# Patient Record
Sex: MALE | Race: BLACK OR AFRICAN AMERICAN | NOT HISPANIC OR LATINO | URBAN - METROPOLITAN AREA
[De-identification: names, ages, dates, MRNs, and addresses within clinical notes are randomized per-mention and may not be internally consistent; named-entity substitution may affect disease eponyms.]

---

## 2023-02-24 LAB
MAGNESIUM (MG/DL) IN SER/PLAS: 2.1 MG/DL (ref 1.6–2.4)
NATRIURETIC PEPTIDE B (PG/ML) IN SER/PLAS: 102 PG/ML (ref 0–99)
TROPONIN I, HIGH SENSITIVITY: 17 NG/L (ref 0–53)

## 2023-09-27 PROBLEM — Z79.01 CHRONIC ANTICOAGULATION: Status: ACTIVE | Noted: 2023-09-27

## 2023-09-27 PROBLEM — I26.99 PULMONARY EMBOLISM (MULTI): Status: ACTIVE | Noted: 2023-09-27

## 2023-09-27 RX ORDER — AMLODIPINE BESYLATE 10 MG/1
5 TABLET ORAL DAILY
COMMUNITY
Start: 2013-10-08 | End: 2024-06-04 | Stop reason: ALTCHOICE

## 2023-09-27 RX ORDER — CALCIUM CARB/VITAMIN D3/VIT K1 500-500-40
1 TABLET,CHEWABLE ORAL DAILY
COMMUNITY

## 2023-09-27 RX ORDER — CALCIUM CARBONATE 500(1250)
1 TABLET ORAL DAILY
COMMUNITY

## 2023-09-27 RX ORDER — MULTIVITAMIN
1 TABLET ORAL DAILY
COMMUNITY

## 2023-09-29 DIAGNOSIS — I26.99 PULMONARY EMBOLISM, UNSPECIFIED CHRONICITY, UNSPECIFIED PULMONARY EMBOLISM TYPE, UNSPECIFIED WHETHER ACUTE COR PULMONALE PRESENT (MULTI): Primary | ICD-10-CM

## 2023-09-29 LAB
INR IN PPP BY COAGULATION ASSAY EXTERNAL: 2.3
PROTHROMBIN TIME (PT) IN PPP BY COAGULATION ASSAY EXTERNAL: NORMAL SECONDS

## 2023-10-13 ENCOUNTER — ANTICOAGULATION - WARFARIN VISIT (OUTPATIENT)
Dept: CARDIOLOGY | Facility: CLINIC | Age: 73
End: 2023-10-13
Payer: MEDICARE

## 2023-10-13 DIAGNOSIS — I26.99 PULMONARY EMBOLISM, UNSPECIFIED CHRONICITY, UNSPECIFIED PULMONARY EMBOLISM TYPE, UNSPECIFIED WHETHER ACUTE COR PULMONALE PRESENT (MULTI): ICD-10-CM

## 2023-10-16 ENCOUNTER — ANTICOAGULATION - WARFARIN VISIT (OUTPATIENT)
Dept: CARDIOLOGY | Facility: CLINIC | Age: 73
End: 2023-10-16
Payer: MEDICARE

## 2023-10-16 ENCOUNTER — APPOINTMENT (OUTPATIENT)
Dept: CARDIOLOGY | Facility: CLINIC | Age: 73
End: 2023-10-16
Payer: MEDICARE

## 2023-10-16 ENCOUNTER — APPOINTMENT (OUTPATIENT)
Dept: CARDIOLOGY | Facility: CLINIC | Age: 73
End: 2023-10-16

## 2023-10-16 DIAGNOSIS — I26.99 PULMONARY EMBOLISM, UNSPECIFIED CHRONICITY, UNSPECIFIED PULMONARY EMBOLISM TYPE, UNSPECIFIED WHETHER ACUTE COR PULMONALE PRESENT (MULTI): Primary | ICD-10-CM

## 2023-10-16 LAB
POC INR: 3.2
POC PROTHROMBIN TIME: NORMAL

## 2023-10-16 PROCEDURE — 99211 OFF/OP EST MAY X REQ PHY/QHP: CPT | Mod: PO,27 | Performed by: INTERNAL MEDICINE

## 2023-10-16 PROCEDURE — 85610 PROTHROMBIN TIME: CPT | Mod: QW

## 2023-10-16 NOTE — PROGRESS NOTES
Patient identification verified with 2 identifiers.    Location: [facility] at [facility] - [address] [address] [phone] option #1     Referring Physician: ANKIT DAVID  Enrollment/ Re-enrollment date: 3/2/24   INR Goal: 2.0-3.0  INR monitoring is per [facility] protocol.  Anticoagulation Medication: warfarin  Indication: PE    Subjective   Bleeding signs/symptoms: No    Bruising: No   Major bleeding event: No  Thrombosis signs/symptoms: No  Thromboembolic event: No  Missed doses: No  Extra doses: No  Medication changes: No  Dietary changes: Yes  REDUCED GREENS OVER LAST VISIT  Change in health: No  Change in activity: No  Alcohol: No  Other concerns: No    Upcoming Surgeries:  Does the Patient Have any upcoming surgeries that require interruption in anticoagulation therapy? no  Does the patient require bridging? no      Anticoagulation Summary  As of 10/16/2023      INR goal:  2.0-3.0   TTR:  48.2 % (1 wk)   INR used for dosing:  3.20 (10/16/2023)   Weekly warfarin total:  41.25 mg               Assessment/Plan   Supratherapeutic     1. New dose: no change    2. Next INR: 1 week      Education provided to patient during the visit:  Patient instructed to call in interim with questions, concerns and changes.   Patient educated on interactions between medications and warfarin.   Patient educated on dietary consistency in vitamin k consumption.   Patient educated on affects of alcohol consumption while taking warfarin.   Patient educated on signs of bleeding/clotting.

## 2023-10-27 ENCOUNTER — APPOINTMENT (OUTPATIENT)
Dept: CARDIOLOGY | Facility: CLINIC | Age: 73
End: 2023-10-27
Payer: MEDICARE

## 2023-10-27 ENCOUNTER — ANTICOAGULATION - WARFARIN VISIT (OUTPATIENT)
Dept: CARDIOLOGY | Facility: CLINIC | Age: 73
End: 2023-10-27
Payer: MEDICARE

## 2023-10-27 ENCOUNTER — CLINICAL SUPPORT (OUTPATIENT)
Dept: CARDIOLOGY | Facility: CLINIC | Age: 73
End: 2023-10-27
Payer: MEDICARE

## 2023-10-27 DIAGNOSIS — I26.99 PULMONARY EMBOLISM, UNSPECIFIED CHRONICITY, UNSPECIFIED PULMONARY EMBOLISM TYPE, UNSPECIFIED WHETHER ACUTE COR PULMONALE PRESENT (MULTI): ICD-10-CM

## 2023-10-27 DIAGNOSIS — I26.99 PULMONARY EMBOLISM, UNSPECIFIED CHRONICITY, UNSPECIFIED PULMONARY EMBOLISM TYPE, UNSPECIFIED WHETHER ACUTE COR PULMONALE PRESENT (MULTI): Primary | ICD-10-CM

## 2023-10-27 LAB
POC INR: 2.7
POC PROTHROMBIN TIME: NORMAL

## 2023-10-27 PROCEDURE — 85610 PROTHROMBIN TIME: CPT | Mod: QW

## 2023-10-27 PROCEDURE — 99211 OFF/OP EST MAY X REQ PHY/QHP: CPT | Mod: PO | Performed by: INTERNAL MEDICINE

## 2023-10-27 RX ORDER — WARFARIN 7.5 MG/1
TABLET ORAL
Qty: 90 TABLET | Refills: 2 | Status: SHIPPED | OUTPATIENT
Start: 2023-10-27 | End: 2024-10-26

## 2023-10-27 NOTE — PROGRESS NOTES
"Patient identification verified with 2 identifiers.    Location: [facility] at [facility] - [address] [address] [phone] option #1     Referring Physician: DR. LAURENCE PHAM   Enrollment/ Re-enrollment date: 2024   INR Goal: 2.0-3.0  INR monitoring is per [facility] protocol.  Anticoagulation Medication: warfarin  Indication: PE    Subjective   Bleeding signs/symptoms:    { ACOAG bleedin}  Bruising:     Major bleeding event:    Thrombosis signs/symptoms:    Thromboembolic event:    Missed doses:    Extra doses:    Medication changes:    Dietary changes:    Change in health:    Change in activity:    Alcohol:    Other concerns:      Upcoming Surgeries:  Does the Patient Have any upcoming surgeries that require interruption in anticoagulation therapy? {yes/no:59553}  Does the patient require bridging? {yes/no:07497}          Assessment/Plan   {therapeutic/subtherapeutic/supratherapeutic:06671}     1. New dose: {no change:59609}    2. Next INR: {time frame/weeks 1-4:63412}      Education provided to patient during the visit:  {Anticoag Pt Ed. documentation:48963::\"Patient instructed to call in interim with questions, concerns and changes. \"}       "

## 2023-10-27 NOTE — PROGRESS NOTES
Patient identification verified with 2 identifiers.    Location: [facility] at [facility] - [address] [address] [phone] option #1     Referring Physician: DR. LAURENCE PHAM  Enrollment/ Re-enrollment date: 2024   INR Goal: 2.0-3.0  INR monitoring is per [facility] protocol.  Anticoagulation Medication: warfarin  Indication: Pulmonary Embolism (PE)    Subjective   Bleeding signs/symptoms: No    Bruising: No   Major bleeding event: No  Thrombosis signs/symptoms: No  Thromboembolic event: No  Missed doses: No  Extra doses: No  Medication changes: No  Dietary changes: No  Change in health: No  Change in activity: No  Alcohol: No  Other concerns: No    Upcoming Surgeries:  Does the Patient Have any upcoming surgeries that require interruption in anticoagulation therapy? no  Does the patient require bridging? no      Anticoagulation Summary  As of 10/27/2023      INR goal:  2.0-3.0   TTR:  55.2 % (2.6 wk)   INR used for dosin.70 (10/27/2023)   Weekly warfarin total:  41.25 mg               Assessment/Plan   Therapeutic     1. New dose: no change    2. Next INR: 2 weeks      Education provided to patient during the visit:  Patient instructed to call in interim with questions, concerns and changes.   Patient educated on compliance with dosing, follow up appointments, and prescribed plan of care.

## 2023-11-10 ENCOUNTER — TELEPHONE (OUTPATIENT)
Dept: RADIATION ONCOLOGY | Facility: HOSPITAL | Age: 73
End: 2023-11-10
Payer: MEDICARE

## 2023-11-10 ENCOUNTER — ANTICOAGULATION - WARFARIN VISIT (OUTPATIENT)
Dept: CARDIOLOGY | Facility: CLINIC | Age: 73
End: 2023-11-10
Payer: MEDICARE

## 2023-11-10 DIAGNOSIS — I26.99 PULMONARY EMBOLISM, UNSPECIFIED CHRONICITY, UNSPECIFIED PULMONARY EMBOLISM TYPE, UNSPECIFIED WHETHER ACUTE COR PULMONALE PRESENT (MULTI): Primary | ICD-10-CM

## 2023-11-10 LAB
POC INR: 2.7
POC PROTHROMBIN TIME: NORMAL

## 2023-11-10 PROCEDURE — 99211 OFF/OP EST MAY X REQ PHY/QHP: CPT | Mod: PO,27 | Performed by: INTERNAL MEDICINE

## 2023-11-10 PROCEDURE — 85610 PROTHROMBIN TIME: CPT | Mod: QW

## 2023-11-10 NOTE — PROGRESS NOTES
Patient identification verified with 2 identifiers.    Location: [facility] at [facility] - [address] [address] [phone] option #1     Referring Physician: DR. LAURENCE PHAM  Enrollment/ Re-enrollment date: 2024   INR Goal: 2.0-3.0  INR monitoring is per [facility] protocol.  Anticoagulation Medication: warfarin  Indication: Pulmonary Embolism (PE)    Subjective   Bleeding signs/symptoms: No    Bruising: No   Major bleeding event: No  Thrombosis signs/symptoms: No  Thromboembolic event: No  Missed doses: No  Extra doses: No  Medication changes: No  Dietary changes: No  Change in health: No  Change in activity: No  Alcohol: No  Other concerns: No    Upcoming Surgeries:  Does the Patient Have any upcoming surgeries that require interruption in anticoagulation therapy? no  Does the patient require bridging? no      Anticoagulation Summary  As of 11/10/2023      INR goal:  2.0-3.0   TTR:  74.4 % (1.1 mo)   INR used for dosin.70 (11/10/2023)   Weekly warfarin total:  41.25 mg               Assessment/Plan   Therapeutic     1. New dose: no change    2. Next INR: 4 weeks      Education provided to patient during the visit:  Patient instructed to call in interim with questions, concerns and changes.   Patient educated on compliance with dosing, follow up appointments, and prescribed plan of care.

## 2023-11-10 NOTE — TELEPHONE ENCOUNTER
Called pt. to remind of appointment on 11/13/2023 at 2:00 pm with ADAM Navarrete.  Pt answered and confirmed appointment.

## 2023-11-13 ENCOUNTER — HOSPITAL ENCOUNTER (OUTPATIENT)
Dept: RADIATION ONCOLOGY | Facility: HOSPITAL | Age: 73
Setting detail: RADIATION/ONCOLOGY SERIES
Discharge: HOME | End: 2023-11-13
Payer: MEDICARE

## 2023-11-13 ENCOUNTER — LAB (OUTPATIENT)
Dept: LAB | Facility: HOSPITAL | Age: 73
End: 2023-11-13
Payer: MEDICARE

## 2023-11-13 VITALS
DIASTOLIC BLOOD PRESSURE: 85 MMHG | BODY MASS INDEX: 29.83 KG/M2 | RESPIRATION RATE: 18 BRPM | TEMPERATURE: 96.6 F | SYSTOLIC BLOOD PRESSURE: 129 MMHG | HEIGHT: 73 IN | WEIGHT: 225.09 LBS | HEART RATE: 85 BPM | OXYGEN SATURATION: 95 %

## 2023-11-13 DIAGNOSIS — C61 MALIGNANT NEOPLASM OF PROSTATE (MULTI): Primary | ICD-10-CM

## 2023-11-13 DIAGNOSIS — C61 MALIGNANT NEOPLASM OF PROSTATE (MULTI): ICD-10-CM

## 2023-11-13 DIAGNOSIS — Z15.89 JAK2 V617F MUTATION: ICD-10-CM

## 2023-11-13 LAB
ALBUMIN SERPL BCP-MCNC: 3.9 G/DL (ref 3.4–5)
ALP SERPL-CCNC: 51 U/L (ref 33–136)
ALT SERPL W P-5'-P-CCNC: 18 U/L (ref 10–52)
ANION GAP SERPL CALC-SCNC: 10 MMOL/L (ref 10–20)
AST SERPL W P-5'-P-CCNC: 31 U/L (ref 9–39)
BASOPHILS # BLD AUTO: 0.02 X10*3/UL (ref 0–0.1)
BASOPHILS NFR BLD AUTO: 0.5 %
BILIRUB SERPL-MCNC: 0.4 MG/DL (ref 0–1.2)
BUN SERPL-MCNC: 19 MG/DL (ref 6–23)
CALCIUM SERPL-MCNC: 9.3 MG/DL (ref 8.6–10.3)
CHLORIDE SERPL-SCNC: 106 MMOL/L (ref 98–107)
CO2 SERPL-SCNC: 27 MMOL/L (ref 21–32)
CREAT SERPL-MCNC: 1.5 MG/DL (ref 0.5–1.3)
EOSINOPHIL # BLD AUTO: 0.14 X10*3/UL (ref 0–0.4)
EOSINOPHIL NFR BLD AUTO: 3.4 %
ERYTHROCYTE [DISTWIDTH] IN BLOOD BY AUTOMATED COUNT: 14.2 % (ref 11.5–14.5)
GFR SERPL CREATININE-BSD FRML MDRD: 48 ML/MIN/1.73M*2
GLUCOSE SERPL-MCNC: 83 MG/DL (ref 74–99)
HCT VFR BLD AUTO: 35.7 % (ref 41–52)
HGB BLD-MCNC: 11.9 G/DL (ref 13.5–17.5)
IMM GRANULOCYTES # BLD AUTO: 0.01 X10*3/UL (ref 0–0.5)
IMM GRANULOCYTES NFR BLD AUTO: 0.2 % (ref 0–0.9)
LYMPHOCYTES # BLD AUTO: 1.11 X10*3/UL (ref 0.8–3)
LYMPHOCYTES NFR BLD AUTO: 27.1 %
MCH RBC QN AUTO: 31.6 PG (ref 26–34)
MCHC RBC AUTO-ENTMCNC: 33.3 G/DL (ref 32–36)
MCV RBC AUTO: 95 FL (ref 80–100)
MONOCYTES # BLD AUTO: 0.47 X10*3/UL (ref 0.05–0.8)
MONOCYTES NFR BLD AUTO: 11.5 %
NEUTROPHILS # BLD AUTO: 2.34 X10*3/UL (ref 1.6–5.5)
NEUTROPHILS NFR BLD AUTO: 57.3 %
NRBC BLD-RTO: 0 /100 WBCS (ref 0–0)
PLATELET # BLD AUTO: 162 X10*3/UL (ref 150–450)
POTASSIUM SERPL-SCNC: 4.2 MMOL/L (ref 3.5–5.3)
PROT SERPL-MCNC: 6.8 G/DL (ref 6.4–8.2)
RBC # BLD AUTO: 3.76 X10*6/UL (ref 4.5–5.9)
SODIUM SERPL-SCNC: 139 MMOL/L (ref 136–145)
TESTOST SERPL-MCNC: 296 NG/DL (ref 240–1000)
WBC # BLD AUTO: 4.1 X10*3/UL (ref 4.4–11.3)

## 2023-11-13 PROCEDURE — 99214 OFFICE O/P EST MOD 30 MIN: CPT | Mod: PO

## 2023-11-13 PROCEDURE — 84153 ASSAY OF PSA TOTAL: CPT

## 2023-11-13 PROCEDURE — 36415 COLL VENOUS BLD VENIPUNCTURE: CPT

## 2023-11-13 PROCEDURE — 80053 COMPREHEN METABOLIC PANEL: CPT

## 2023-11-13 PROCEDURE — 84403 ASSAY OF TOTAL TESTOSTERONE: CPT

## 2023-11-13 PROCEDURE — 99214 OFFICE O/P EST MOD 30 MIN: CPT

## 2023-11-13 PROCEDURE — 85025 COMPLETE CBC W/AUTO DIFF WBC: CPT

## 2023-11-13 PROCEDURE — 84154 ASSAY OF PSA FREE: CPT | Mod: 59

## 2023-11-13 ASSESSMENT — PATIENT HEALTH QUESTIONNAIRE - PHQ9
2. FEELING DOWN, DEPRESSED OR HOPELESS: NOT AT ALL
1. LITTLE INTEREST OR PLEASURE IN DOING THINGS: NOT AT ALL
SUM OF ALL RESPONSES TO PHQ9 QUESTIONS 1 AND 2: 0

## 2023-11-13 ASSESSMENT — ENCOUNTER SYMPTOMS
BLOOD IN STOOL: 0
DEPRESSION: 0
ANAL BLEEDING: 0
COUGH: 0
WEAKNESS: 0
ABDOMINAL PAIN: 0
DYSURIA: 0
DIARRHEA: 0
FREQUENCY: 0
JOINT SWELLING: 0
OCCASIONAL FEELINGS OF UNSTEADINESS: 0
UNEXPECTED WEIGHT CHANGE: 0
RECTAL PAIN: 0
ALLERGIC/IMMUNOLOGIC NEGATIVE: 1
LOSS OF SENSATION IN FEET: 0
HEMATURIA: 0
CONSTIPATION: 0
PSYCHIATRIC NEGATIVE: 1
BACK PAIN: 0
ARTHRALGIAS: 0
PALPITATIONS: 0
DIFFICULTY URINATING: 0
DIZZINESS: 0
FEVER: 0
FATIGUE: 0
CHEST TIGHTNESS: 0
SHORTNESS OF BREATH: 0

## 2023-11-13 ASSESSMENT — COLUMBIA-SUICIDE SEVERITY RATING SCALE - C-SSRS
2. HAVE YOU ACTUALLY HAD ANY THOUGHTS OF KILLING YOURSELF?: NO
1. IN THE PAST MONTH, HAVE YOU WISHED YOU WERE DEAD OR WISHED YOU COULD GO TO SLEEP AND NOT WAKE UP?: NO
6. HAVE YOU EVER DONE ANYTHING, STARTED TO DO ANYTHING, OR PREPARED TO DO ANYTHING TO END YOUR LIFE?: NO

## 2023-11-13 ASSESSMENT — PAIN SCALES - GENERAL: PAINLEVEL: 0-NO PAIN

## 2023-11-14 ENCOUNTER — APPOINTMENT (OUTPATIENT)
Dept: RADIATION ONCOLOGY | Facility: HOSPITAL | Age: 73
End: 2023-11-14
Payer: MEDICARE

## 2023-11-15 LAB
PSA FREE MFR SERPL: NORMAL %
PSA FREE SERPL-MCNC: <0.1 NG/ML
PSA SERPL IA-MCNC: <0.1 NG/ML (ref 0–4)

## 2023-11-16 ENCOUNTER — TELEPHONE (OUTPATIENT)
Dept: RADIATION ONCOLOGY | Facility: HOSPITAL | Age: 73
End: 2023-11-16
Payer: MEDICARE

## 2023-11-16 NOTE — TELEPHONE ENCOUNTER
Reviewed with patient PSA of <0.10 consistent with prior. Also reviewed CBC improving as well as CMP. Poor renal function consistent with prior levels. Will continue to follow up PSA in 3m per [id number].

## 2023-11-23 ENCOUNTER — HOSPITAL ENCOUNTER (EMERGENCY)
Facility: HOSPITAL | Age: 73
Discharge: HOME | End: 2023-11-23
Attending: EMERGENCY MEDICINE
Payer: MEDICARE

## 2023-11-23 ENCOUNTER — APPOINTMENT (OUTPATIENT)
Dept: RADIOLOGY | Facility: HOSPITAL | Age: 73
End: 2023-11-23
Payer: MEDICARE

## 2023-11-23 VITALS
TEMPERATURE: 98.2 F | DIASTOLIC BLOOD PRESSURE: 89 MMHG | OXYGEN SATURATION: 95 % | RESPIRATION RATE: 18 BRPM | SYSTOLIC BLOOD PRESSURE: 126 MMHG | HEART RATE: 79 BPM

## 2023-11-23 DIAGNOSIS — M54.42 ACUTE BILATERAL LOW BACK PAIN WITH BILATERAL SCIATICA: Primary | ICD-10-CM

## 2023-11-23 DIAGNOSIS — S39.012A LUMBAR STRAIN, INITIAL ENCOUNTER: ICD-10-CM

## 2023-11-23 DIAGNOSIS — M54.41 ACUTE BILATERAL LOW BACK PAIN WITH BILATERAL SCIATICA: Primary | ICD-10-CM

## 2023-11-23 LAB
ALBUMIN SERPL BCP-MCNC: 3.6 G/DL (ref 3.4–5)
ALP SERPL-CCNC: 54 U/L (ref 33–136)
ALT SERPL W P-5'-P-CCNC: 17 U/L (ref 10–52)
ANION GAP SERPL CALC-SCNC: 10 MMOL/L (ref 10–20)
AST SERPL W P-5'-P-CCNC: 26 U/L (ref 9–39)
BASOPHILS # BLD AUTO: 0.02 X10*3/UL (ref 0–0.1)
BASOPHILS NFR BLD AUTO: 0.5 %
BILIRUB DIRECT SERPL-MCNC: 0.1 MG/DL (ref 0–0.3)
BILIRUB SERPL-MCNC: 0.5 MG/DL (ref 0–1.2)
BUN SERPL-MCNC: 17 MG/DL (ref 6–23)
CALCIUM SERPL-MCNC: 8.3 MG/DL (ref 8.6–10.3)
CHLORIDE SERPL-SCNC: 107 MMOL/L (ref 98–107)
CO2 SERPL-SCNC: 24 MMOL/L (ref 21–32)
CREAT SERPL-MCNC: 1.65 MG/DL (ref 0.5–1.3)
EOSINOPHIL # BLD AUTO: 0.15 X10*3/UL (ref 0–0.4)
EOSINOPHIL NFR BLD AUTO: 3.6 %
ERYTHROCYTE [DISTWIDTH] IN BLOOD BY AUTOMATED COUNT: 14.1 % (ref 11.5–14.5)
GFR SERPL CREATININE-BSD FRML MDRD: 43 ML/MIN/1.73M*2
GLUCOSE SERPL-MCNC: 102 MG/DL (ref 74–99)
HCT VFR BLD AUTO: 34.6 % (ref 41–52)
HGB BLD-MCNC: 11.3 G/DL (ref 13.5–17.5)
IMM GRANULOCYTES # BLD AUTO: 0.01 X10*3/UL (ref 0–0.5)
IMM GRANULOCYTES NFR BLD AUTO: 0.2 % (ref 0–0.9)
INR PPP: 3.5 (ref 0.9–1.1)
LYMPHOCYTES # BLD AUTO: 1.06 X10*3/UL (ref 0.8–3)
LYMPHOCYTES NFR BLD AUTO: 25.7 %
MCH RBC QN AUTO: 31 PG (ref 26–34)
MCHC RBC AUTO-ENTMCNC: 32.7 G/DL (ref 32–36)
MCV RBC AUTO: 95 FL (ref 80–100)
MONOCYTES # BLD AUTO: 0.39 X10*3/UL (ref 0.05–0.8)
MONOCYTES NFR BLD AUTO: 9.4 %
NEUTROPHILS # BLD AUTO: 2.5 X10*3/UL (ref 1.6–5.5)
NEUTROPHILS NFR BLD AUTO: 60.6 %
NRBC BLD-RTO: 0 /100 WBCS (ref 0–0)
PLATELET # BLD AUTO: 185 X10*3/UL (ref 150–450)
POTASSIUM SERPL-SCNC: 3.8 MMOL/L (ref 3.5–5.3)
PROT SERPL-MCNC: 6.2 G/DL (ref 6.4–8.2)
PROTHROMBIN TIME: 39.5 SECONDS (ref 9.8–12.8)
RBC # BLD AUTO: 3.64 X10*6/UL (ref 4.5–5.9)
SODIUM SERPL-SCNC: 137 MMOL/L (ref 136–145)
WBC # BLD AUTO: 4.1 X10*3/UL (ref 4.4–11.3)

## 2023-11-23 PROCEDURE — 99284 EMERGENCY DEPT VISIT MOD MDM: CPT | Mod: 25

## 2023-11-23 PROCEDURE — 36415 COLL VENOUS BLD VENIPUNCTURE: CPT | Performed by: EMERGENCY MEDICINE

## 2023-11-23 PROCEDURE — 80048 BASIC METABOLIC PNL TOTAL CA: CPT | Performed by: EMERGENCY MEDICINE

## 2023-11-23 PROCEDURE — 96374 THER/PROPH/DIAG INJ IV PUSH: CPT

## 2023-11-23 PROCEDURE — 2500000004 HC RX 250 GENERAL PHARMACY W/ HCPCS (ALT 636 FOR OP/ED): Performed by: EMERGENCY MEDICINE

## 2023-11-23 PROCEDURE — 99285 EMERGENCY DEPT VISIT HI MDM: CPT | Mod: 25 | Performed by: EMERGENCY MEDICINE

## 2023-11-23 PROCEDURE — 84075 ASSAY ALKALINE PHOSPHATASE: CPT | Performed by: EMERGENCY MEDICINE

## 2023-11-23 PROCEDURE — 85025 COMPLETE CBC W/AUTO DIFF WBC: CPT | Performed by: EMERGENCY MEDICINE

## 2023-11-23 PROCEDURE — 72131 CT LUMBAR SPINE W/O DYE: CPT

## 2023-11-23 PROCEDURE — 85610 PROTHROMBIN TIME: CPT | Performed by: EMERGENCY MEDICINE

## 2023-11-23 PROCEDURE — 96375 TX/PRO/DX INJ NEW DRUG ADDON: CPT

## 2023-11-23 PROCEDURE — 72131 CT LUMBAR SPINE W/O DYE: CPT | Mod: FOREIGN READ | Performed by: RADIOLOGY

## 2023-11-23 RX ORDER — ORPHENADRINE CITRATE 100 MG/1
100 TABLET, EXTENDED RELEASE ORAL 2 TIMES DAILY PRN
Qty: 20 TABLET | Refills: 0 | Status: SHIPPED | OUTPATIENT
Start: 2023-11-23 | End: 2023-12-03

## 2023-11-23 RX ORDER — MORPHINE SULFATE 4 MG/ML
4 INJECTION, SOLUTION INTRAMUSCULAR; INTRAVENOUS ONCE
Status: COMPLETED | OUTPATIENT
Start: 2023-11-23 | End: 2023-11-23

## 2023-11-23 RX ORDER — ORPHENADRINE CITRATE 30 MG/ML
60 INJECTION INTRAMUSCULAR; INTRAVENOUS ONCE
Status: COMPLETED | OUTPATIENT
Start: 2023-11-23 | End: 2023-11-23

## 2023-11-23 RX ORDER — ONDANSETRON HYDROCHLORIDE 2 MG/ML
4 INJECTION, SOLUTION INTRAVENOUS ONCE
Status: COMPLETED | OUTPATIENT
Start: 2023-11-23 | End: 2023-11-23

## 2023-11-23 RX ORDER — ACETAMINOPHEN 325 MG/1
975 TABLET ORAL ONCE
Status: COMPLETED | OUTPATIENT
Start: 2023-11-23 | End: 2023-11-23

## 2023-11-23 RX ADMIN — HYDROMORPHONE HYDROCHLORIDE 0.5 MG: 1 INJECTION, SOLUTION INTRAMUSCULAR; INTRAVENOUS; SUBCUTANEOUS at 08:30

## 2023-11-23 RX ADMIN — ORPHENADRINE CITRATE 60 MG: 60 INJECTION INTRAMUSCULAR; INTRAVENOUS at 08:29

## 2023-11-23 RX ADMIN — MORPHINE SULFATE 4 MG: 4 INJECTION, SOLUTION INTRAMUSCULAR; INTRAVENOUS at 06:57

## 2023-11-23 RX ADMIN — ONDANSETRON 4 MG: 2 INJECTION INTRAMUSCULAR; INTRAVENOUS at 06:57

## 2023-11-23 RX ADMIN — ACETAMINOPHEN 975 MG: 325 TABLET ORAL at 08:30

## 2023-11-23 ASSESSMENT — PAIN - FUNCTIONAL ASSESSMENT: PAIN_FUNCTIONAL_ASSESSMENT: 0-10

## 2023-11-23 ASSESSMENT — PAIN SCALES - GENERAL: PAINLEVEL_OUTOF10: 3

## 2023-11-23 ASSESSMENT — COLUMBIA-SUICIDE SEVERITY RATING SCALE - C-SSRS
2. HAVE YOU ACTUALLY HAD ANY THOUGHTS OF KILLING YOURSELF?: NO
6. HAVE YOU EVER DONE ANYTHING, STARTED TO DO ANYTHING, OR PREPARED TO DO ANYTHING TO END YOUR LIFE?: NO
1. IN THE PAST MONTH, HAVE YOU WISHED YOU WERE DEAD OR WISHED YOU COULD GO TO SLEEP AND NOT WAKE UP?: NO

## 2023-11-23 ASSESSMENT — PAIN DESCRIPTION - LOCATION: LOCATION: BACK

## 2023-11-23 NOTE — ED PROVIDER NOTES
HPI   Chief Complaint   Patient presents with    Back Pain     Low back after he felt a pop yesterday whilst working out       HPI: []  73-year-old  male with a history of hypertension, pulm embolism on Coumadin today comes in with atraumatic back pain.  He states that few days ago he did lifting heavy was working out and he injured his back.  No trauma no falls.  Since then he has severe low back pain.  Able to ambulate.  Pain nonradiating.  No numbness or weakness or extremities no seizures no incontinence.  No fever no chills.  No drug use.  No back surgeries in the past.  Patient denies any chest pain pressure heaviness fever chills nausea vomit diarrhea cough congestion incontinence seizures syncope or ischemia recent travel hospitalization or antibiotic use.  He states he has been worked up/being screened for prostate cancer.    Past history: Hypertension, pulm embolism, questionable prostate cancer  Social: Denies tobacco alcohol drug abuse.    REVIEW OF SYSTEMS:    GENERAL.: No weight loss, fatigue, anorexia, insomnia, fever.    EYES: No vision loss, double vision, drainage, eye pain.    ENT: No pharyngitis, dry mouth.    CARDIOPULMONARY: No chest pain, palpitations, syncope, near syncope. No shortness of breath, cough, hemoptysis.    GI: No abdominal pain, change in bowel habits, melena, hematemesis, hematochezia, nausea, vomiting, diarrhea.    : No discharge, dysuria, frequency, urgency, hematuria.    MS: No limb pain, joint pain, joint swelling.  Positive for back pain    SKIN: No rashes.    PSYCH: No depression, anxiety, suicidality, homicidality.    Review of systems is otherwise negative unless stated above or in history of present illness.  Social history, family history, allergies reviewed.  PHYSICAL EXAM:    GENERAL: Vitals noted, no distress. Alert and oriented  x 3. Non-toxic.  Appears uncomfortable    EENT: TMs clear. Posterior oropharynx unremarkable. No meningismus. No LAD.     NECK:  Supple. Nontender. No midline tenderness.     CARDIAC: Regular, rate, rhythm. No murmurs rubs or gallops. No JVD    PULMONARY: Lungs clear bilaterally with good aeration. No wheezes rales or rhonchi. No respiratory distress.     ABDOMEN: Soft, nonsurgical. Nontender. No peritoneal signs. Normoactive bowel sounds. No pulsatile masses.     EXTREMITIES: No peripheral edema. Negative Homans bilaterally, no cords.  2+ bounding pulses well-perfused  Musculoskeletal patient no midline C, T, L-spine tenderness.  He is tender palpation of bilateral SI joints.  Straight leg is positive bilaterally symmetrically at around 60 degrees.  SKIN: No rash. Intact.     NEURO: No focal neurologic deficits, NIH score of 0. Cranial nerves normal as tested from II through XII.  Patient has intact sensory exam intact motor exam motor strength 5 out of 5 lower extremities bilaterally DTRs are 2+ at the knees 1+ at the ankles.  No clonus.    MEDICAL DECISION MAKING:  CBC with differential chemistry LFTs are unremarkable 3.5 therapeutic CT L-spine shows advanced DJD no fractures and no evidence of epidural hematoma or any mass lesions.    Treatment in ED: IV established given IV morphine for pain control, oral Tylenol and Norflex    ED course: Repeat assessment feeling remarkably better able to ambulate remains neurologic intact most to go home.    Impression: #1 acute back pain, #2 acute lumbosacral radiculopathy    Plan set MDM: Elderly male today comes in with traumatic back pain appears mechanical musculoskeletal low suspicion for discitis osteomyelitis epidural hematoma spinal epidural abscess, patient neurologic intact no suspicion for spinal cord compromise or cord compression or cauda equina syndrome low suspicion for AAA, patient feels better wants to go home will be discharged home with the Tylenol and Norflex advised physical therapy outpatient [facility] with strict return precaution.                          [place] Coma Scale Score:  15                  Patient History   Past Medical History:   Diagnosis Date    CKD (chronic kidney disease)     HTN (hypertension)     Other pulmonary embolism without acute cor pulmonale (CMS/HCC) 06/03/2014    Pulmonary embolism    Personal history of other diseases of the musculoskeletal system and connective tissue 04/23/2019    History of arthritis     Past Surgical History:   Procedure Laterality Date    OTHER SURGICAL HISTORY  04/23/2019    Knee surgery    OTHER SURGICAL HISTORY  04/23/2019    Shoulder surgery    OTHER SURGICAL HISTORY  04/23/2019    Gallbladder surgery     No family history on file.  Social History     Tobacco Use    Smoking status: Former     Types: Cigarettes     Passive exposure: Past    Smokeless tobacco: Never   Substance Use Topics    Alcohol use: Not on file    Drug use: Not on file       Physical Exam   ED Triage Vitals   Temp Heart Rate Resp BP   11/23/23 0620 11/23/23 0645 11/23/23 0620 11/23/23 0620   36.8 °C (98.2 °F) 79 18 (!) 134/91      SpO2 Temp src Heart Rate Source Patient Position   11/23/23 0620 -- 11/23/23 0645 11/23/23 0645   96 %  Monitor Sitting      BP Location FiO2 (%)     11/23/23 0645 --     Right arm        Physical Exam    ED Course & MDM   ED Course as of 11/23/23 1334   Thu Nov 23, 2023   1255 Upon reassessment patient's pain is well-controlled.  He remains neurologically intact bounding pulses low suspicion for cord compromise or spinal cord compression or epidural hematoma discitis osteomyelitis or AAA, patient labs reviewed imaging reviewed with the patient he wants to go home will be discharged home with Tylenol and Norflex advised abortive care physical therapy outpatient follow with primary doctor and spine with strict return precaution.  Again please make an outpatient remains neurologically intact with no neurovascular compromise bounding pulses with low suspicion for cord compromise cord compression cauda equina syndrome AAA or any other catastrophic  pathology. [MT]      ED Course User Index  [MT] Katie Aguilera MD         Diagnoses as of 11/23/23 1334   Acute bilateral low back pain with bilateral sciatica   Lumbar strain, initial encounter       Medical Decision Making      Procedure  Procedures     Katie Aguilera MD  11/23/23 5607

## 2023-12-08 ENCOUNTER — ANTICOAGULATION - WARFARIN VISIT (OUTPATIENT)
Dept: CARDIOLOGY | Facility: CLINIC | Age: 73
End: 2023-12-08
Payer: MEDICARE

## 2023-12-08 DIAGNOSIS — I26.99 PULMONARY EMBOLISM, UNSPECIFIED CHRONICITY, UNSPECIFIED PULMONARY EMBOLISM TYPE, UNSPECIFIED WHETHER ACUTE COR PULMONALE PRESENT (MULTI): ICD-10-CM

## 2023-12-08 LAB
POC INR: 1.9
POC PROTHROMBIN TIME: NORMAL

## 2023-12-08 PROCEDURE — 99211 OFF/OP EST MAY X REQ PHY/QHP: CPT | Mod: PO,27 | Performed by: INTERNAL MEDICINE

## 2023-12-08 PROCEDURE — 85610 PROTHROMBIN TIME: CPT | Mod: QW

## 2023-12-08 NOTE — PROGRESS NOTES
Patient identification verified with 2 identifiers.    Location: [facility] at [facility] - [address] [address] [phone] option #1     Referring Physician: DR. LAURENCE PHAM  Enrollment/ Re-enrollment date: 2024   INR Goal: 2.0-3.0  INR monitoring is per [facility] protocol.  Anticoagulation Medication: warfarin  Indication: Pulmonary Embolism (PE)    Subjective   Bleeding signs/symptoms: No    Bruising: No   Major bleeding event: No  Thrombosis signs/symptoms: No  Thromboembolic event: No  Missed doses: No  Extra doses: No  Medication changes: No  Dietary changes: No  Change in health: No  Change in activity: No  Alcohol: No  Other concerns: No    Upcoming Surgeries:  Does the Patient Have any upcoming surgeries that require interruption in anticoagulation therapy? no  Does the patient require bridging? no      Anticoagulation Summary  As of 2023      INR goal:  2.0-3.0   TTR:  63.7 % (2 mo)   INR used for dosin.90 (2023)   Weekly warfarin total:  41.25 mg               Assessment/Plan   SUB THERAPEUTIC    1. New dose: no change  as pt was monthly on this dose  2. Next INR: 1 WEEK      Education provided to patient during the visit:  Patient instructed to call in interim with questions, concerns and changes.   Patient educated on compliance with dosing, follow up appointments, and prescribed plan of care.

## 2023-12-15 ENCOUNTER — ANTICOAGULATION - WARFARIN VISIT (OUTPATIENT)
Dept: CARDIOLOGY | Facility: CLINIC | Age: 73
End: 2023-12-15
Payer: MEDICARE

## 2023-12-15 DIAGNOSIS — I26.99 PULMONARY EMBOLISM, UNSPECIFIED CHRONICITY, UNSPECIFIED PULMONARY EMBOLISM TYPE, UNSPECIFIED WHETHER ACUTE COR PULMONALE PRESENT (MULTI): Primary | ICD-10-CM

## 2023-12-15 LAB
POC INR: 2.4
POC PROTHROMBIN TIME: NORMAL

## 2023-12-15 PROCEDURE — 99211 OFF/OP EST MAY X REQ PHY/QHP: CPT | Mod: PO,27 | Performed by: INTERNAL MEDICINE

## 2023-12-15 PROCEDURE — 85610 PROTHROMBIN TIME: CPT | Mod: QW

## 2023-12-15 NOTE — PROGRESS NOTES
Patient identification verified with 2 identifiers.    Location: [facility] at [facility] - [address] [address] [phone] option #1     Referring Physician: DR. LAURENCE PHAM  Enrollment/ Re-enrollment date: 2024   INR Goal: 2.0-3.0  INR monitoring is per [facility] protocol.  Anticoagulation Medication: warfarin  Indication: Pulmonary Embolism (PE)    Subjective   Bleeding signs/symptoms: No    Bruising: No   Major bleeding event: No  Thrombosis signs/symptoms: No  Thromboembolic event: No  Missed doses: No  Extra doses: No  Medication changes: No  Dietary changes: No  Change in health: No  Change in activity: No  Alcohol: No  Other concerns: No    Upcoming Surgeries:  Does the Patient Have any upcoming surgeries that require interruption in anticoagulation therapy? no  Does the patient require bridging? no      Anticoagulation Summary  As of 12/15/2023      INR goal:  2.0-3.0   TTR:  65.4 % (2.2 mo)   INR used for dosin.40 (12/15/2023)   Weekly warfarin total:  41.25 mg               Assessment/Plan   Therapeutic     1. New dose: no change    2. Next INR: 2 weeks      Education provided to patient during the visit:  Patient instructed to call in interim with questions, concerns and changes.   Patient educated on compliance with dosing, follow up appointments, and prescribed plan of care.

## 2023-12-29 ENCOUNTER — ANTICOAGULATION - WARFARIN VISIT (OUTPATIENT)
Dept: CARDIOLOGY | Facility: CLINIC | Age: 73
End: 2023-12-29
Payer: MEDICARE

## 2023-12-29 DIAGNOSIS — I26.99 PULMONARY EMBOLISM, UNSPECIFIED CHRONICITY, UNSPECIFIED PULMONARY EMBOLISM TYPE, UNSPECIFIED WHETHER ACUTE COR PULMONALE PRESENT (MULTI): Primary | ICD-10-CM

## 2023-12-29 LAB
POC INR: 3.9
POC PROTHROMBIN TIME: NORMAL

## 2023-12-29 PROCEDURE — 85610 PROTHROMBIN TIME: CPT | Mod: QW

## 2023-12-29 PROCEDURE — 99211 OFF/OP EST MAY X REQ PHY/QHP: CPT | Performed by: INTERNAL MEDICINE

## 2023-12-29 NOTE — PROGRESS NOTES
Patient identification verified with 2 identifiers.    Location: [facility] at [facility] - [address] [address] [phone] option #1     Referring Physician: DR. LAURENCE PHAM  Enrollment/ Re-enrollment date: 02/02/2024   INR Goal: 2.0-3.0  INR monitoring is per [facility] protocol.  Anticoagulation Medication: warfarin  Indication: Pulmonary Embolism (PE)    Subjective   Bleeding signs/symptoms: No  Bruising: No   Major bleeding event: No  Thrombosis signs/symptoms: No  Thromboembolic event: No  Missed doses: No  Extra doses: No  Medication changes: Yes  Dietary changes: No  Change in health: No  Change in activity: No  Alcohol: No  Other concerns: No    Upcoming Surgeries:  Does the Patient Have any upcoming surgeries that require interruption in anticoagulation therapy? no  Does the patient require bridging? no      Anticoagulation Summary  As of 12/29/2023      INR goal:  2.0-3.0   TTR:  61.2 % (2.7 mo)   INR used for dosing:  3.90 (12/29/2023)   Weekly warfarin total:  41.25 mg               Assessment/Plan   Supratherapeutic     1. New dose: HOLD WARFARIN DOSE TODAY (12/29)  2. Next INR: 1 week      Education provided to patient during the visit:  Patient instructed to call in interim with questions, concerns and changes.   Patient educated on interactions between medications and warfarin.   Patient educated on dietary consistency in vitamin k consumption.   Patient educated on affects of alcohol consumption while taking warfarin.   Patient educated on signs of bleeding/clotting.   Patient educated on compliance with dosing, follow up appointments, and prescribed plan of care.

## 2024-01-08 ENCOUNTER — ANTICOAGULATION - WARFARIN VISIT (OUTPATIENT)
Dept: CARDIOLOGY | Facility: CLINIC | Age: 74
End: 2024-01-08
Payer: MEDICARE

## 2024-01-08 DIAGNOSIS — I26.99 PULMONARY EMBOLISM, UNSPECIFIED CHRONICITY, UNSPECIFIED PULMONARY EMBOLISM TYPE, UNSPECIFIED WHETHER ACUTE COR PULMONALE PRESENT (MULTI): ICD-10-CM

## 2024-01-08 LAB
POC INR: 2.8
POC PROTHROMBIN TIME: NORMAL

## 2024-01-08 PROCEDURE — 85610 PROTHROMBIN TIME: CPT | Mod: QW

## 2024-01-08 PROCEDURE — 99211 OFF/OP EST MAY X REQ PHY/QHP: CPT | Performed by: INTERNAL MEDICINE

## 2024-01-08 NOTE — PROGRESS NOTES
Patient identification verified with 2 identifiers.    Location: [facility] at [facility] - [address] [address] [phone] option #1     Referring Physician: DR. LACEY PHAM  Enrollment/ Re-enrollment date: 3/2/2024   INR Goal: 2.0-3.0  INR monitoring is per [facility] protocol.  Anticoagulation Medication: warfarin  Indication: Pulmonary Embolism (PE)    Subjective   Bleeding signs/symptoms: No    Bruising: No   Major bleeding event: No  Thrombosis signs/symptoms: No  Thromboembolic event: No  Missed doses: No  Extra doses: No  Medication changes: No  Dietary changes: No  Change in health: No  Change in activity: No  Alcohol: No  Other concerns: No    Upcoming Surgeries:  Does the Patient Have any upcoming surgeries that require interruption in anticoagulation therapy? no  Does the patient require bridging? no      Anticoagulation Summary  As of 2024      INR goal:  2.0-3.0   TTR:  56.4 % (3 mo)   INR used for dosin.80 (2024)   Weekly warfarin total:  41.25 mg               Assessment/Plan   Therapeutic     1. New dose: no change    2. Next INR: 1 week      Education provided to patient during the visit:  Patient instructed to call in interim with questions, concerns and changes.   Patient educated on interactions between medications and warfarin.   Patient educated on dietary consistency in vitamin k consumption.   Patient educated on affects of alcohol consumption while taking warfarin.   Patient educated on signs of bleeding/clotting.   Patient educated on compliance with dosing, follow up appointments, and prescribed plan of care.

## 2024-01-19 ENCOUNTER — ANTICOAGULATION - WARFARIN VISIT (OUTPATIENT)
Dept: CARDIOLOGY | Facility: CLINIC | Age: 74
End: 2024-01-19
Payer: MEDICARE

## 2024-01-19 DIAGNOSIS — I26.99 PULMONARY EMBOLISM, UNSPECIFIED CHRONICITY, UNSPECIFIED PULMONARY EMBOLISM TYPE, UNSPECIFIED WHETHER ACUTE COR PULMONALE PRESENT (MULTI): ICD-10-CM

## 2024-01-19 LAB
POC INR: 3.3
POC PROTHROMBIN TIME: NORMAL

## 2024-01-19 PROCEDURE — 99211 OFF/OP EST MAY X REQ PHY/QHP: CPT | Performed by: INTERNAL MEDICINE

## 2024-01-19 PROCEDURE — 85610 PROTHROMBIN TIME: CPT | Mod: QW

## 2024-01-19 NOTE — PROGRESS NOTES
Patient identification verified with 2 identifiers.    Location: [facility] at [facility] - [address] [address] [phone] option #1     Referring Physician: DR. LACEY PHAM  Enrollment/ Re-enrollment date: 3/2/2024   INR Goal: 2.0-3.0  INR monitoring is per [facility] protocol.  Anticoagulation Medication: warfarin  Indication: Pulmonary Embolism (PE)    Subjective   Bleeding signs/symptoms: No    Bruising: No   Major bleeding event: No  Thrombosis signs/symptoms: No  Thromboembolic event: No  Missed doses: No  Extra doses: No  Medication changes: No  Dietary changes: No  Change in health: No  Change in activity: No  Alcohol: No  Other concerns: No    Upcoming Surgeries:  Does the Patient Have any upcoming surgeries that require interruption in anticoagulation therapy? no  Does the patient require bridging? no      Anticoagulation Summary  As of 1/19/2024      INR goal:  2.0-3.0   TTR:  54.6 % (3.4 mo)   INR used for dosing:  3.30 (1/19/2024)   Weekly warfarin total:  37.5 mg               Assessment/Plan   Supra therapeutic    1. New dose:Decrease weekly dose    2. Next INR: 1 week      Education provided to patient during the visit:  Patient instructed to call in interim with questions, concerns and changes.   Patient educated on interactions between medications and warfarin.   Patient educated on dietary consistency in vitamin k consumption.   Patient educated on affects of alcohol consumption while taking warfarin.   Patient educated on signs of bleeding/clotting.   Patient educated on compliance with dosing, follow up appointments, and prescribed plan of care.

## 2024-01-22 ENCOUNTER — TELEPHONE (OUTPATIENT)
Dept: CARDIOLOGY | Facility: CLINIC | Age: 74
End: 2024-01-22
Payer: MEDICARE

## 2024-01-26 ENCOUNTER — ANTICOAGULATION - WARFARIN VISIT (OUTPATIENT)
Dept: CARDIOLOGY | Facility: CLINIC | Age: 74
End: 2024-01-26
Payer: MEDICARE

## 2024-01-26 DIAGNOSIS — I26.99 PULMONARY EMBOLISM, UNSPECIFIED CHRONICITY, UNSPECIFIED PULMONARY EMBOLISM TYPE, UNSPECIFIED WHETHER ACUTE COR PULMONALE PRESENT (MULTI): ICD-10-CM

## 2024-01-26 LAB
POC INR: 3.6
POC PROTHROMBIN TIME: NORMAL

## 2024-01-26 PROCEDURE — 99211 OFF/OP EST MAY X REQ PHY/QHP: CPT | Performed by: INTERNAL MEDICINE

## 2024-01-26 PROCEDURE — 85610 PROTHROMBIN TIME: CPT | Mod: QW

## 2024-01-26 NOTE — PROGRESS NOTES
Patient identification verified with 2 identifiers.    Location: [facility] at [facility] - [address] [address] [phone] option #1     Referring Physician: Dr. Marla Vela  Enrollment/ Re-enrollment date: 3/2/24   INR Goal: 2.0-3.0  INR monitoring is per [facility] protocol.  Anticoagulation Medication: warfarin  Indication: Pulmonary Embolism (PE)    Subjective   Bleeding signs/symptoms: No    Bruising: No   Major bleeding event: No  Thrombosis signs/symptoms: No  Thromboembolic event: No  Missed doses: No  Extra doses: No  Medication changes: No  Dietary changes: No  Change in health: No  Change in activity: No  Alcohol: No  Other concerns: No    Upcoming Surgeries:  Does the Patient Have any upcoming surgeries that require interruption in anticoagulation therapy? no  Does the patient require bridging? no      Anticoagulation Summary  As of 1/26/2024      INR goal:  2.0-3.0   TTR:  51.1 % (3.6 mo)   INR used for dosing:  3.60 (1/26/2024)   Weekly warfarin total:  33.75 mg               Assessment/Plan   Supratherapeutic     1. New dose:  hold today's dose and decrease per protocol.      2. Next INR: 1 week      Education provided to patient during the visit:  Patient instructed to call in interim with questions, concerns and changes.   Patient educated on interactions between medications and warfarin.   Patient educated on dietary consistency in vitamin k consumption.   Patient educated on affects of alcohol consumption while taking warfarin.   Patient educated on signs of bleeding/clotting.

## 2024-01-29 NOTE — ADDENDUM NOTE
Encounter addended by: VEDA Torres-ADAM on: 1/29/2024 11:52 AM   Actions taken: Clinical Note Signed

## 2024-02-02 ENCOUNTER — ANTICOAGULATION - WARFARIN VISIT (OUTPATIENT)
Dept: CARDIOLOGY | Facility: CLINIC | Age: 74
End: 2024-02-02
Payer: MEDICARE

## 2024-02-02 DIAGNOSIS — I26.99 PULMONARY EMBOLISM, UNSPECIFIED CHRONICITY, UNSPECIFIED PULMONARY EMBOLISM TYPE, UNSPECIFIED WHETHER ACUTE COR PULMONALE PRESENT (MULTI): Primary | ICD-10-CM

## 2024-02-02 DIAGNOSIS — C61 MALIGNANT NEOPLASM OF PROSTATE (MULTI): Primary | ICD-10-CM

## 2024-02-02 LAB
POC INR: 2.1
POC PROTHROMBIN TIME: NORMAL

## 2024-02-02 PROCEDURE — 99211 OFF/OP EST MAY X REQ PHY/QHP: CPT | Performed by: INTERNAL MEDICINE

## 2024-02-02 PROCEDURE — 85610 PROTHROMBIN TIME: CPT | Mod: QW

## 2024-02-02 NOTE — PROGRESS NOTES
Patient identification verified with 2 identifiers.    Location: [facility] at [facility] - [address] [address] [phone] option #1     Referring Physician: Dr. Marla Vela  Enrollment/ Re-enrollment date: 3/2/24   INR Goal: 2.0-3.0  INR monitoring is per [facility] protocol.  Anticoagulation Medication: warfarin  Indication: Pulmonary Embolism (PE)    Subjective   Bleeding signs/symptoms: No  Bruising: No   Major bleeding event: No  Thrombosis signs/symptoms: No  Thromboembolic event: No  Missed doses: No  Extra doses: No  Medication changes: No  Dietary changes: No  Change in health: No  Change in activity: No  Alcohol: No  Other concerns: No    Upcoming Procedures:  Does the Patient Have any upcoming procedures that require interruption in anticoagulation therapy? no  Does the patient require bridging? no      Anticoagulation Summary  As of 2024      INR goal:  2.0-3.0   TTR:  51.6 % (3.9 mo)   INR used for dosin.10 (2024)   Weekly warfarin total:  33.75 mg               Assessment/Plan   Therapeutic     1. New dose: no change    2. Next INR: 1 week      Education provided to patient during the visit:  Patient instructed to call in interim with questions, concerns and changes.   Patient educated on interactions between medications and warfarin.   Patient educated on dietary consistency in vitamin k consumption.   Patient educated on affects of alcohol consumption while taking warfarin.   Patient educated on signs of bleeding/clotting.   Patient educated on compliance with dosing, follow up appointments, and prescribed plan of care.

## 2024-02-06 ENCOUNTER — APPOINTMENT (OUTPATIENT)
Dept: ORTHOPEDIC SURGERY | Facility: CLINIC | Age: 74
End: 2024-02-06
Payer: MEDICARE

## 2024-02-08 ENCOUNTER — TELEPHONE (OUTPATIENT)
Dept: RADIATION ONCOLOGY | Facility: HOSPITAL | Age: 74
End: 2024-02-08
Payer: MEDICARE

## 2024-02-08 NOTE — TELEPHONE ENCOUNTER
Called pt. to remind of appointment on 2/13/2024 at 2:30 pm with ADAM Navarrete.  Pt answered and confirmed appointment.

## 2024-02-09 ENCOUNTER — ANTICOAGULATION - WARFARIN VISIT (OUTPATIENT)
Dept: CARDIOLOGY | Facility: CLINIC | Age: 74
End: 2024-02-09
Payer: MEDICARE

## 2024-02-09 DIAGNOSIS — I26.99 PULMONARY EMBOLISM, UNSPECIFIED CHRONICITY, UNSPECIFIED PULMONARY EMBOLISM TYPE, UNSPECIFIED WHETHER ACUTE COR PULMONALE PRESENT (MULTI): ICD-10-CM

## 2024-02-09 LAB
POC INR: 2.3
POC PROTHROMBIN TIME: NORMAL

## 2024-02-09 PROCEDURE — 99211 OFF/OP EST MAY X REQ PHY/QHP: CPT | Performed by: INTERNAL MEDICINE

## 2024-02-09 PROCEDURE — 85610 PROTHROMBIN TIME: CPT | Mod: QW

## 2024-02-09 NOTE — PROGRESS NOTES
Patient identification verified with 2 identifiers.    Location: [facility] - [address] [address] [phone] option #1     Referring Physician: Dr. Marla Vela  Enrollment/ Re-enrollment date: 3/2/24   INR Goal: 2.0-3.0  INR monitoring is per AMS protocol.  Anticoagulation Medication: warfarin  Indication: Pulmonary Embolism (PE)    Subjective   Bleeding signs/symptoms: No  Bruising: No   Major bleeding event: No  Thrombosis signs/symptoms: No  Thromboembolic event: No  Missed doses: No  Extra doses: No  Medication changes: No  Dietary changes: No  Change in health: No  Change in activity: No  Alcohol: No  Other concerns: No    Upcoming Procedures:  Does the Patient Have any upcoming procedures that require interruption in anticoagulation therapy? no  Does the patient require bridging? no      Anticoagulation Summary  As of 2024      INR goal:  2.0-3.0   TTR:  54.5 % (4.1 mo)   INR used for dosin.30 (2024)   Weekly warfarin total:  33.75 mg               Assessment/Plan   Therapeutic     1. New dose: no change    2. Next INR: 2 weeks      Education provided to patient during the visit:  Patient instructed to call in interim with questions, concerns and changes.   Patient educated on interactions between medications and warfarin.   Patient educated on dietary consistency in vitamin k consumption.   Patient educated on affects of alcohol consumption while taking warfarin.   Patient educated on signs of bleeding/clotting.   Patient educated on compliance with dosing, follow up appointments, and prescribed plan of care.

## 2024-02-12 ASSESSMENT — ENCOUNTER SYMPTOMS
HEMATURIA: 0
WEAKNESS: 0
ARTHRALGIAS: 0
ALLERGIC/IMMUNOLOGIC NEGATIVE: 1
FREQUENCY: 0
FATIGUE: 0
CONSTIPATION: 0
PSYCHIATRIC NEGATIVE: 1
DIFFICULTY URINATING: 0
SHORTNESS OF BREATH: 0
ABDOMINAL PAIN: 0
UNEXPECTED WEIGHT CHANGE: 0
DIZZINESS: 0
DIARRHEA: 0
ANAL BLEEDING: 0
PALPITATIONS: 0
CHEST TIGHTNESS: 0
RECTAL PAIN: 0
BACK PAIN: 0
FEVER: 0
COUGH: 0
DYSURIA: 0
JOINT SWELLING: 0
BLOOD IN STOOL: 0

## 2024-02-12 NOTE — PROGRESS NOTES
Cancer synopsis:  Rad/onc: Karissa Rivera/ ADAM Navarrete  PCP: JAZMIN Pierce  Med/onc: Dr. Marques/ CNP Soler     Prostate Cancer - Local  Treatment  -Initial PSA 7.87, prostate biopsy consistent with [place] 4+5 = 9, grade group 5, MRI February 2023 no evidence of extracapsular extension or lymph node involvement, PET PSMA negative for distant metastases  -Potential discussion of [facility] 1821, use of SBRT with abiraterone, ADT, PARP inhibition    - 3/2/23 C1D1 on above trial    - Completed RT to prostate SV and LN on 5/15/23     History of presenting illness:    Patient ID: [id number]     Louis Rainey is a 73 y.o. male who presents to me for his prostate cancer GS 4+5=9, IPSA 7.87 now s/p RT. Enrolled in [id number]    RT Site: prostate and SV  RT Date: 05/15/2023  Hormone therapy: Yes, AA/p, ADT, and naraparib. ([id number] protocol), done 09/2023  Hot Flushes: Reports hot flushes have resolved since last visit.  Fatigue: Denies  Bone pain: Admits to lower back pain that has been treated with PT.  PALOMA: 1  ED: Admits to loss of sexual since use of systemic therapy. Drive is still low per patient.  ED medications: No  IPSS: 10  Urinary symptoms: Denies dysuria, hematuria or urine leakage. Denies incomplete bladder or frequency. Does report some urgency at times most notably with ETOH consumption. Denies dysuria, hematuria, urgency, or urine leakage. Nocturia 2 times a night.Delighted with QOL in regards to urination.  Urinary Medications: No  Rectal bleeding: Denies  Other systems: Denies SOB, CP or fever.      Review of systems:  Review of Systems   Constitutional:  Negative for fatigue, fever and unexpected weight change.   Respiratory:  Negative for cough, chest tightness and shortness of breath.    Cardiovascular:  Negative for chest pain, palpitations and leg swelling.   Gastrointestinal:  Negative for abdominal pain, anal bleeding, blood in stool, constipation, diarrhea and rectal pain.   Endocrine: Negative for cold  "intolerance, heat intolerance and polyuria.   Genitourinary:  Negative for decreased urine volume, difficulty urinating, dysuria, frequency, hematuria and urgency.        Refer to HPI   Musculoskeletal:  Negative for arthralgias, back pain, gait problem and joint swelling.   Skin: Negative.    Allergic/Immunologic: Negative.    Neurological:  Negative for dizziness, syncope and weakness.   Psychiatric/Behavioral: Negative.         Past Medical history  Past Medical History:   Diagnosis Date    CKD (chronic kidney disease)     HTN (hypertension)     Other pulmonary embolism without acute cor pulmonale (CMS/[facility]) 06/03/2014    Pulmonary embolism    Personal history of other diseases of the musculoskeletal system and connective tissue 04/23/2019    History of arthritis        Surgical/family history  No family history on file.   Past Surgical History:   Procedure Laterality Date    OTHER SURGICAL HISTORY  04/23/2019    Knee surgery    OTHER SURGICAL HISTORY  04/23/2019    Shoulder surgery    OTHER SURGICAL HISTORY  04/23/2019    Gallbladder surgery        Social History  Tobacco Use: Medium Risk (2/13/2024)    Patient History     Smoking Tobacco Use: Former     Smokeless Tobacco Use: Never     Passive Exposure: Past         Current med list:  Current Outpatient Medications   Medication Instructions    amLODIPine (NORVASC) 10 mg, oral, Daily    calcium carbonate (Oscal) 500 mg calcium (1,250 mg) tablet 1 tablet, oral, Daily    cholecalciferol (Vitamin D-3) 10 mcg (400 unit) capsule 1 capsule, oral, Daily    multivitamin tablet 1 tablet, oral, Daily    orphenadrine (NORFLEX) 100 mg, oral, 2 times daily PRN, Do not crush, chew, or split.    warfarin (Coumadin) 7.5 mg tablet Take as directed per After Visit Summary.        Last recorded vital:  /84   Pulse 75   Temp 36.2 °C (97.2 °F) (Temporal)   Resp 18   Ht 1.845 m (6' 0.65\")   Wt 103 kg (226 lb 4.8 oz)   SpO2 95%   BMI 30.15 kg/m²     Physical exam  Physical " Exam  Constitutional:       Appearance: Normal appearance.   Cardiovascular:      Rate and Rhythm: Normal rate.   Pulmonary:      Effort: Pulmonary effort is normal.      Breath sounds: Normal breath sounds.   Musculoskeletal:         General: Normal range of motion.      Cervical back: Normal range of motion.   Neurological:      Mental Status: He is alert and oriented to person, place, and time.   Psychiatric:         Mood and Affect: Mood normal.         Behavior: Behavior normal.         Thought Content: Thought content normal.         Judgment: Judgment normal.       ECOG:  ECOG 0    Pertinent labs:  PSA   Date/Time Value Ref Range Status   11/13/2023 01:35 PM <0.1 0.0 - 4.0 ng/mL Final     Comment:     INTERPRETIVE INFORMATION: Prostate Specific Antigen    The Roche PSA electrochemiluminescent immunoassay is used. Results   obtained with different test methods or kits cannot be used   interchangeably. The Roche PSA method is approved for use as an   aid in the detection of prostate cancer when used in conjunction   with a digital rectal exam in individuals with a prostate age 50   years and older. The Roche PSA is also indicated for the serial   measurement of PSA to aid in the prognosis and management of   prostate cancer patients. Elevated PSA concentrations can only   suggest the presence of prostate cancer until biopsy is performed.   PSA concentrations can also be elevated in benign prostatic   hyperplasia or inflammatory conditions of the prostate. PSA is   generally not elevated in healthy individuals or individuals with   nonprostatic carcinoma.         Dx:  Problem List Items Addressed This Visit    None  Visit Diagnoses       Malignant neoplasm of prostate (CMS/HCC)    -  Primary    Relevant Orders    Clinic Appointment Request Follow Up; MOY CIFUENTES; [facility] S600 RADONC (Completed)         Discussed need for labs today per [id number]. Review of latent SE including rectal bleeding, hematuria,  urinary strictures, ED where reviewed as well as how to contact office if s/s present. Denies latent SE. Current ED related to low testerone from systemic therapy and will re-asses with rise into normal of testerone. NCCN guidelines where reviewed and routine FUV of every 3m for first year and every 6m for four years for a total of five years was discussed. Patient verbalized understanding.      Recommend vitamin D supplementation, start (or increase by) 400-1000 units daily. Reviewed importance of intake while on Testerone lowering therapy as well as after until Testerone re-establishes, at which point may stop if DEXA indicative of normal bone density. Also reviewed that individuals at a higher risk such as those over the age of 75 or those with a hx of bone fx, osteoporosis or osteopenia to continue supplementation indefinitely with routine DEXA imaging as per national guidelines to assess bone health continually.    PLAN:  FUV 6m  Labs per KRYSTAL Mcdaniel1 (CBC, testerone/PSA)  Imaging none  FUV other providers: PCP for routine evals        Please contact office with any concerns:  Paul Cannon CNP  [phone]

## 2024-02-13 ENCOUNTER — APPOINTMENT (OUTPATIENT)
Dept: RADIATION ONCOLOGY | Facility: HOSPITAL | Age: 74
End: 2024-02-13
Payer: MEDICARE

## 2024-02-13 ENCOUNTER — HOSPITAL ENCOUNTER (OUTPATIENT)
Dept: RADIATION ONCOLOGY | Facility: HOSPITAL | Age: 74
Setting detail: RADIATION/ONCOLOGY SERIES
Discharge: HOME | End: 2024-02-13
Payer: MEDICARE

## 2024-02-13 ENCOUNTER — LAB (OUTPATIENT)
Dept: LAB | Facility: HOSPITAL | Age: 74
End: 2024-02-13
Payer: MEDICARE

## 2024-02-13 ENCOUNTER — OFFICE VISIT (OUTPATIENT)
Dept: ORTHOPEDIC SURGERY | Facility: CLINIC | Age: 74
End: 2024-02-13
Payer: MEDICARE

## 2024-02-13 VITALS
HEIGHT: 73 IN | TEMPERATURE: 97.2 F | BODY MASS INDEX: 29.99 KG/M2 | RESPIRATION RATE: 18 BRPM | SYSTOLIC BLOOD PRESSURE: 120 MMHG | OXYGEN SATURATION: 95 % | HEART RATE: 75 BPM | WEIGHT: 226.3 LBS | DIASTOLIC BLOOD PRESSURE: 84 MMHG

## 2024-02-13 DIAGNOSIS — C61 MALIGNANT NEOPLASM OF PROSTATE (MULTI): Primary | ICD-10-CM

## 2024-02-13 DIAGNOSIS — M48.07 LUMBOSACRAL STENOSIS: ICD-10-CM

## 2024-02-13 DIAGNOSIS — M47.816 ARTHRITIS OF LUMBAR SPINE: Primary | ICD-10-CM

## 2024-02-13 DIAGNOSIS — C61 MALIGNANT NEOPLASM OF PROSTATE (MULTI): ICD-10-CM

## 2024-02-13 LAB
BASOPHILS # BLD AUTO: 0.04 X10*3/UL (ref 0–0.1)
BASOPHILS NFR BLD AUTO: 0.9 %
EOSINOPHIL # BLD AUTO: 0.15 X10*3/UL (ref 0–0.4)
EOSINOPHIL NFR BLD AUTO: 3.3 %
ERYTHROCYTE [DISTWIDTH] IN BLOOD BY AUTOMATED COUNT: 15.3 % (ref 11.5–14.5)
HCT VFR BLD AUTO: 39.9 % (ref 41–52)
HGB BLD-MCNC: 13 G/DL (ref 13.5–17.5)
HOLD SPECIMEN: NORMAL
IMM GRANULOCYTES # BLD AUTO: 0.05 X10*3/UL (ref 0–0.5)
IMM GRANULOCYTES NFR BLD AUTO: 1.1 % (ref 0–0.9)
LYMPHOCYTES # BLD AUTO: 1.53 X10*3/UL (ref 0.8–3)
LYMPHOCYTES NFR BLD AUTO: 34.2 %
MCH RBC QN AUTO: 29.9 PG (ref 26–34)
MCHC RBC AUTO-ENTMCNC: 32.6 G/DL (ref 32–36)
MCV RBC AUTO: 92 FL (ref 80–100)
MONOCYTES # BLD AUTO: 0.4 X10*3/UL (ref 0.05–0.8)
MONOCYTES NFR BLD AUTO: 8.9 %
NEUTROPHILS # BLD AUTO: 2.31 X10*3/UL (ref 1.6–5.5)
NEUTROPHILS NFR BLD AUTO: 51.6 %
NRBC BLD-RTO: 0 /100 WBCS (ref 0–0)
PLATELET # BLD AUTO: 197 X10*3/UL (ref 150–450)
PSA SERPL-MCNC: <0.1 NG/ML
RBC # BLD AUTO: 4.35 X10*6/UL (ref 4.5–5.9)
WBC # BLD AUTO: 4.5 X10*3/UL (ref 4.4–11.3)

## 2024-02-13 PROCEDURE — 99214 OFFICE O/P EST MOD 30 MIN: CPT | Performed by: ORTHOPAEDIC SURGERY

## 2024-02-13 PROCEDURE — 84153 ASSAY OF PSA TOTAL: CPT

## 2024-02-13 PROCEDURE — 1036F TOBACCO NON-USER: CPT | Performed by: ORTHOPAEDIC SURGERY

## 2024-02-13 PROCEDURE — 85025 COMPLETE CBC W/AUTO DIFF WBC: CPT

## 2024-02-13 PROCEDURE — 99214 OFFICE O/P EST MOD 30 MIN: CPT

## 2024-02-13 PROCEDURE — 36415 COLL VENOUS BLD VENIPUNCTURE: CPT

## 2024-02-13 PROCEDURE — 1126F AMNT PAIN NOTED NONE PRSNT: CPT | Performed by: ORTHOPAEDIC SURGERY

## 2024-02-13 PROCEDURE — 1159F MED LIST DOCD IN RCRD: CPT | Performed by: ORTHOPAEDIC SURGERY

## 2024-02-13 ASSESSMENT — PAIN SCALES - GENERAL
PAINLEVEL_OUTOF10: 3
PAINLEVEL: 4

## 2024-02-13 ASSESSMENT — PAIN - FUNCTIONAL ASSESSMENT: PAIN_FUNCTIONAL_ASSESSMENT: 0-10

## 2024-02-13 ASSESSMENT — PATIENT HEALTH QUESTIONNAIRE - PHQ9
SUM OF ALL RESPONSES TO PHQ9 QUESTIONS 1 AND 2: 0
2. FEELING DOWN, DEPRESSED OR HOPELESS: NOT AT ALL
1. LITTLE INTEREST OR PLEASURE IN DOING THINGS: NOT AT ALL

## 2024-02-13 ASSESSMENT — ENCOUNTER SYMPTOMS
DEPRESSION: 0
OCCASIONAL FEELINGS OF UNSTEADINESS: 0
LOSS OF SENSATION IN FEET: 0

## 2024-02-13 NOTE — PROGRESS NOTES
HPI:Louis Rainey is a 73-year-old man, who comes in with complaints of acute low back pain that started in November right before Thanksgiving.  He denies any specific leg pain.  The pain is worse with activity and it has persisted since November.  He has been doing physical therapy without improvement.      ROS:  Reviewed on EMR and patient intake sheet.    PMH/SH:  Reviewed on EMR and patient intake sheet.    Exam:  Physical Exam    Constitutional: Well appearing; no acute distress  Eyes: pupils are equal and round  Psych: normal affect  Respiratory: non-labored breathing  Cardiovascular: regular rate and rhythm  GI: non-distended abdomen  Musculoskeletal: no pain with range of motion of the hips bilaterally  Neurologic: [5]/5 strength in the lower extremities bilaterally]; [negative] straight leg raise; no clonus; negative babinski    Radiology:     CT scan demonstrates moderate stenosis at L3-4.  Multilevel spondylosis.  Grade 1 spondylolisthesis at L5-S1.    Diagnosis:    Lumbar spondylosis; lumbar stenosis    Assessment and Plan:   73-year-old man with multilevel lumbar spondylosis and some underlying spinal stenosis.  At this time the patient would be a good candidate for some steroid injections.    We discussed the role of steroid injections in the treatment of recalcitrant pain.  I described the actual procedure involved in administering these injections as well as the potential benefits and risks of these injections.  These risk include, but are not limited to, those of infection, spinal fluid leaks, epidural hematoma, continued pain or paraesthesia, increased risk for insufficiency fractures, and the potential need for ongoing treatment.  I discussed the potential that these injections can loose their efficacy over time and should not be viewed as a cure to the underlying problem but, rather, a pain management modality.    If his symptoms remain intolerable despite injections, then an MRI and  follow-up would be appropriate.    The patient was in agreement with the plan. At the end of the visit today, the patient felt that all questions had been answered satisfactorily.  The patient was pleased with the visit and very appreciative for the care rendered.     Thank you very much for the kind referral.  It is a privilege, and a pleasure, to partner with you in the care of your patients.  I would be delighted to assist you with any further consultations as needed.          Flo Plunkett MD    Chief of Spine Surgery, [facility]  Director of Spine Service, [facility]  , Department of Orthopaedics  [facility] [employer]  [address] [place] [address]  P: [phone]    This note was dictated with voice recognition software.  It has not been proofread for grammatical errors, typographical mistakes or other semantic inconsistencies.

## 2024-02-14 ENCOUNTER — TELEPHONE (OUTPATIENT)
Dept: RADIATION ONCOLOGY | Facility: HOSPITAL | Age: 74
End: 2024-02-14
Payer: MEDICARE

## 2024-02-15 ENCOUNTER — OFFICE VISIT (OUTPATIENT)
Dept: PAIN MEDICINE | Facility: CLINIC | Age: 74
End: 2024-02-15
Payer: MEDICARE

## 2024-02-15 VITALS
SYSTOLIC BLOOD PRESSURE: 128 MMHG | TEMPERATURE: 98.5 F | DIASTOLIC BLOOD PRESSURE: 73 MMHG | WEIGHT: 217 LBS | HEIGHT: 72 IN | BODY MASS INDEX: 29.39 KG/M2 | HEART RATE: 87 BPM

## 2024-02-15 DIAGNOSIS — M48.062 SPINAL STENOSIS OF LUMBAR REGION WITH NEUROGENIC CLAUDICATION: Primary | ICD-10-CM

## 2024-02-15 DIAGNOSIS — M48.07 LUMBOSACRAL STENOSIS: ICD-10-CM

## 2024-02-15 PROCEDURE — 1125F AMNT PAIN NOTED PAIN PRSNT: CPT | Performed by: ANESTHESIOLOGY

## 2024-02-15 PROCEDURE — 1159F MED LIST DOCD IN RCRD: CPT | Performed by: ANESTHESIOLOGY

## 2024-02-15 PROCEDURE — 1036F TOBACCO NON-USER: CPT | Performed by: ANESTHESIOLOGY

## 2024-02-15 PROCEDURE — 99213 OFFICE O/P EST LOW 20 MIN: CPT | Performed by: ANESTHESIOLOGY

## 2024-02-15 PROCEDURE — 99203 OFFICE O/P NEW LOW 30 MIN: CPT | Performed by: ANESTHESIOLOGY

## 2024-02-15 RX ORDER — LANOLIN ALCOHOL/MO/W.PET/CERES
1000 CREAM (GRAM) TOPICAL DAILY
COMMUNITY

## 2024-02-15 SDOH — ECONOMIC STABILITY: FOOD INSECURITY: WITHIN THE PAST 12 MONTHS, THE FOOD YOU BOUGHT JUST DIDN'T LAST AND YOU DIDN'T HAVE MONEY TO GET MORE.: NEVER TRUE

## 2024-02-15 SDOH — ECONOMIC STABILITY: FOOD INSECURITY: WITHIN THE PAST 12 MONTHS, YOU WORRIED THAT YOUR FOOD WOULD RUN OUT BEFORE YOU GOT MONEY TO BUY MORE.: NEVER TRUE

## 2024-02-15 ASSESSMENT — ENCOUNTER SYMPTOMS
FEVER: 0
SHORTNESS OF BREATH: 0
NUMBNESS: 1
OCCASIONAL FEELINGS OF UNSTEADINESS: 1
DIFFICULTY URINATING: 0
WEAKNESS: 0
LOSS OF SENSATION IN FEET: 1
ADENOPATHY: 0
BACK PAIN: 1
ABDOMINAL PAIN: 0
EYE PAIN: 0
DEPRESSION: 0

## 2024-02-15 ASSESSMENT — PATIENT HEALTH QUESTIONNAIRE - PHQ9
1. LITTLE INTEREST OR PLEASURE IN DOING THINGS: NOT AT ALL
SUM OF ALL RESPONSES TO PHQ9 QUESTIONS 1 AND 2: 0
2. FEELING DOWN, DEPRESSED OR HOPELESS: NOT AT ALL

## 2024-02-15 ASSESSMENT — LIFESTYLE VARIABLES: HOW OFTEN DO YOU HAVE SIX OR MORE DRINKS ON ONE OCCASION: NEVER

## 2024-02-15 ASSESSMENT — PAIN SCALES - GENERAL
PAINLEVEL_OUTOF10: 8
PAINLEVEL: 2

## 2024-02-15 ASSESSMENT — PAIN DESCRIPTION - DESCRIPTORS: DESCRIPTORS: BURNING

## 2024-02-15 ASSESSMENT — PAIN - FUNCTIONAL ASSESSMENT: PAIN_FUNCTIONAL_ASSESSMENT: 0-10

## 2024-02-15 NOTE — PROGRESS NOTES
Chief Complain    New Patient Visit (Lower back pain on the right side, sometimes radiating to the left, began the day before thanksgiving  while working out with weights.  Hurts worse while standing and walking.  Tried PT, and heat to alleviate pain, minimal relief.  Hx of bilateral knee surgery in '97.  Referred by Dr. Plunkett.  CT done in November.) and Shoulder Pain    History Of Present Illness  Louis Rainey is a 73 y.o. male here for evaluation of low back pain worse on the right. The patient has been experiencing these symptoms since day before thangiving . The patient describes the pain as burning. The patient's current pain score is 2 on a scale from 0-10. The pain is worsened by standing, walking, and lying down and is alleviated by sitting. Since the start of the symptoms the pain has been better.    The patient denies any fever, chills, weight loss, weakness, bladder/ bowel incontinence, history of IV drug abuse, recent trauma.    +history of prostate cancer      Past Medical History  He has a past medical history of CKD (chronic kidney disease), HTN (hypertension), Other pulmonary embolism without acute cor pulmonale (CMS/HCC) (06/03/2014), and Personal history of other diseases of the musculoskeletal system and connective tissue (04/23/2019).    Surgical History  He has a past surgical history that includes Other surgical history (04/23/2019); Other surgical history (04/23/2019); and Other surgical history (04/23/2019).    Social History  He reports that he has quit smoking. His smoking use included cigarettes. He has been exposed to tobacco smoke. He has never used smokeless tobacco. He reports current alcohol use. He reports that he does not use drugs.    Family History  No family history on file.     Allergies  Cefazolin sodium, Cephalexin, and Cephalosporins    Review of Systems  Review of Systems   Constitutional:  Negative for fever.   HENT:  Negative for ear pain.    Eyes:  Negative for  pain.   Respiratory:  Negative for shortness of breath.    Cardiovascular:  Negative for chest pain.   Gastrointestinal:  Negative for abdominal pain.   Endocrine: Negative for cold intolerance and heat intolerance.   Genitourinary:  Negative for difficulty urinating.   Musculoskeletal:  Positive for back pain.   Skin:  Negative for rash.   Allergic/Immunologic: Negative for food allergies.   Neurological:  Positive for numbness. Negative for weakness.   Hematological:  Negative for adenopathy.   Psychiatric/Behavioral:  Negative for suicidal ideas.         Physical Exam  Physical Exam  HENT:      Head: Normocephalic and atraumatic.      Right Ear: External ear normal.      Left Ear: External ear normal.      Nose: Nose normal.      Mouth/Throat:      Mouth: Mucous membranes are moist.   Eyes:      Extraocular Movements: Extraocular movements intact.      Pupils: Pupils are equal, round, and reactive to light.   Cardiovascular:      Rate and Rhythm: Normal rate.   Pulmonary:      Effort: Pulmonary effort is normal.   Abdominal:      Palpations: Abdomen is soft.   Musculoskeletal:      Cervical back: Neck supple.      Lumbar back: No tenderness. Decreased range of motion. Negative right straight leg raise test and negative left straight leg raise test.        Back:    Skin:     General: Skin is warm.   Neurological:      Mental Status: He is alert and oriented to person, place, and time.      Sensory: Sensation is intact.      Motor: Motor function is intact.      Deep Tendon Reflexes:      Reflex Scores:       Patellar reflexes are 1+ on the right side and 1+ on the left side.       Achilles reflexes are 1+ on the right side and 1+ on the left side.  Psychiatric:         Mood and Affect: Mood normal.         Behavior: Behavior normal.           Last Recorded Vitals  Blood pressure 128/73, pulse 87, temperature 36.9 °C (98.5 °F), height 1.829 m (6'), weight 98.4 kg (217 lb).    Reviewed Images  Reviewed and  independently interpreted CT of lumbar spine revealing significant spinal canal was stated at L3-4 and to lesser extent at L4-5    Reviewed Labs   Latest Reference Range & Units 02/13/24 14:58   WBC 4.4 - 11.3 x10*3/uL 4.5   nRBC 0.0 - 0.0 /100 WBCs 0.0   RBC 4.50 - 5.90 x10*6/uL 4.35 (L)   HEMOGLOBIN 13.5 - 17.5 g/dL 13.0 (L)   HEMATOCRIT 41.0 - 52.0 % 39.9 (L)   MCV 80 - 100 fL 92   MCH 26.0 - 34.0 pg 29.9   MCHC 32.0 - 36.0 g/dL 32.6   RED CELL DISTRIBUTION WIDTH 11.5 - 14.5 % 15.3 (H)   Platelets 150 - 450 x10*3/uL 197   Neutrophils % 40.0 - 80.0 % 51.6   Immature Granulocytes %, Automated 0.0 - 0.9 % 1.1 (H)   Lymphocytes % 13.0 - 44.0 % 34.2   Monocytes % 2.0 - 10.0 % 8.9   Eosinophils % 0.0 - 6.0 % 3.3   Basophils % 0.0 - 2.0 % 0.9   Neutrophils Absolute 1.60 - 5.50 x10*3/uL 2.31   Immature Granulocytes Absolute, Automated 0.00 - 0.50 x10*3/uL 0.05   Lymphocytes Absolute 0.80 - 3.00 x10*3/uL 1.53   Monocytes Absolute 0.05 - 0.80 x10*3/uL 0.40   Eosinophils Absolute 0.00 - 0.40 x10*3/uL 0.15   Basophils Absolute 0.00 - 0.10 x10*3/uL 0.04   (L): Data is abnormally low  (H): Data is abnormally high     Assessment/Plan   Encounter Diagnoses   Name Primary?    Lumbosacral stenosis     Spinal stenosis of lumbar region with neurogenic claudication Yes        Louis Raieny is a 73 y.o. male here for evaluation of low back pain worse on the right side.  He has been experiencing the symptoms since August however his symptoms have been worse a day before Thanksgiving 2023.  Then he has done physical therapy with modest improvement in his symptoms.  However continues to have significant pain especially when he is standing and walking.  Improved with sitting down.  Review of the CT scan of his lumbar spine does reveal significant spinal canal stenosis at L3-4, 4 5.  Would recommend trial of epidural steroid injection as next step in managing his pain.  If he can be off the Coumadin for 5 days.  If he does not respond  would refer him back to spine surgery for consideration of surgical options.       Elieser Verma MD

## 2024-02-15 NOTE — H&P (VIEW-ONLY)
Chief Complain    New Patient Visit (Lower back pain on the right side, sometimes radiating to the left, began the day before thanksgiving  while working out with weights.  Hurts worse while standing and walking.  Tried PT, and heat to alleviate pain, minimal relief.  Hx of bilateral knee surgery in '97.  Referred by Dr. Plunkett.  CT done in November.) and Shoulder Pain    History Of Present Illness  Louis Rainey is a 73 y.o. male here for evaluation of low back pain worse on the right. The patient has been experiencing these symptoms since day before thangiving . The patient describes the pain as burning. The patient's current pain score is 2 on a scale from 0-10. The pain is worsened by standing, walking, and lying down and is alleviated by sitting. Since the start of the symptoms the pain has been better.    The patient denies any fever, chills, weight loss, weakness, bladder/ bowel incontinence, history of IV drug abuse, recent trauma.    +history of prostate cancer      Past Medical History  He has a past medical history of CKD (chronic kidney disease), HTN (hypertension), Other pulmonary embolism without acute cor pulmonale (CMS/HCC) (06/03/2014), and Personal history of other diseases of the musculoskeletal system and connective tissue (04/23/2019).    Surgical History  He has a past surgical history that includes Other surgical history (04/23/2019); Other surgical history (04/23/2019); and Other surgical history (04/23/2019).    Social History  He reports that he has quit smoking. His smoking use included cigarettes. He has been exposed to tobacco smoke. He has never used smokeless tobacco. He reports current alcohol use. He reports that he does not use drugs.    Family History  No family history on file.     Allergies  Cefazolin sodium, Cephalexin, and Cephalosporins    Review of Systems  Review of Systems   Constitutional:  Negative for fever.   HENT:  Negative for ear pain.    Eyes:  Negative for  pain.   Respiratory:  Negative for shortness of breath.    Cardiovascular:  Negative for chest pain.   Gastrointestinal:  Negative for abdominal pain.   Endocrine: Negative for cold intolerance and heat intolerance.   Genitourinary:  Negative for difficulty urinating.   Musculoskeletal:  Positive for back pain.   Skin:  Negative for rash.   Allergic/Immunologic: Negative for food allergies.   Neurological:  Positive for numbness. Negative for weakness.   Hematological:  Negative for adenopathy.   Psychiatric/Behavioral:  Negative for suicidal ideas.         Physical Exam  Physical Exam  HENT:      Head: Normocephalic and atraumatic.      Right Ear: External ear normal.      Left Ear: External ear normal.      Nose: Nose normal.      Mouth/Throat:      Mouth: Mucous membranes are moist.   Eyes:      Extraocular Movements: Extraocular movements intact.      Pupils: Pupils are equal, round, and reactive to light.   Cardiovascular:      Rate and Rhythm: Normal rate.   Pulmonary:      Effort: Pulmonary effort is normal.   Abdominal:      Palpations: Abdomen is soft.   Musculoskeletal:      Cervical back: Neck supple.      Lumbar back: No tenderness. Decreased range of motion. Negative right straight leg raise test and negative left straight leg raise test.        Back:    Skin:     General: Skin is warm.   Neurological:      Mental Status: He is alert and oriented to person, place, and time.      Sensory: Sensation is intact.      Motor: Motor function is intact.      Deep Tendon Reflexes:      Reflex Scores:       Patellar reflexes are 1+ on the right side and 1+ on the left side.       Achilles reflexes are 1+ on the right side and 1+ on the left side.  Psychiatric:         Mood and Affect: Mood normal.         Behavior: Behavior normal.           Last Recorded Vitals  Blood pressure 128/73, pulse 87, temperature 36.9 °C (98.5 °F), height 1.829 m (6'), weight 98.4 kg (217 lb).    Reviewed Images  Reviewed and  independently interpreted CT of lumbar spine revealing significant spinal canal was stated at L3-4 and to lesser extent at L4-5    Reviewed Labs   Latest Reference Range & Units 02/13/24 14:58   WBC 4.4 - 11.3 x10*3/uL 4.5   nRBC 0.0 - 0.0 /100 WBCs 0.0   RBC 4.50 - 5.90 x10*6/uL 4.35 (L)   HEMOGLOBIN 13.5 - 17.5 g/dL 13.0 (L)   HEMATOCRIT 41.0 - 52.0 % 39.9 (L)   MCV 80 - 100 fL 92   MCH 26.0 - 34.0 pg 29.9   MCHC 32.0 - 36.0 g/dL 32.6   RED CELL DISTRIBUTION WIDTH 11.5 - 14.5 % 15.3 (H)   Platelets 150 - 450 x10*3/uL 197   Neutrophils % 40.0 - 80.0 % 51.6   Immature Granulocytes %, Automated 0.0 - 0.9 % 1.1 (H)   Lymphocytes % 13.0 - 44.0 % 34.2   Monocytes % 2.0 - 10.0 % 8.9   Eosinophils % 0.0 - 6.0 % 3.3   Basophils % 0.0 - 2.0 % 0.9   Neutrophils Absolute 1.60 - 5.50 x10*3/uL 2.31   Immature Granulocytes Absolute, Automated 0.00 - 0.50 x10*3/uL 0.05   Lymphocytes Absolute 0.80 - 3.00 x10*3/uL 1.53   Monocytes Absolute 0.05 - 0.80 x10*3/uL 0.40   Eosinophils Absolute 0.00 - 0.40 x10*3/uL 0.15   Basophils Absolute 0.00 - 0.10 x10*3/uL 0.04   (L): Data is abnormally low  (H): Data is abnormally high     Assessment/Plan   Encounter Diagnoses   Name Primary?    Lumbosacral stenosis     Spinal stenosis of lumbar region with neurogenic claudication Yes        Louis Rainey is a 73 y.o. male here for evaluation of low back pain worse on the right side.  He has been experiencing the symptoms since August however his symptoms have been worse a day before Thanksgiving 2023.  Then he has done physical therapy with modest improvement in his symptoms.  However continues to have significant pain especially when he is standing and walking.  Improved with sitting down.  Review of the CT scan of his lumbar spine does reveal significant spinal canal stenosis at L3-4, 4 5.  Would recommend trial of epidural steroid injection as next step in managing his pain.  If he can be off the Coumadin for 5 days.  If he does not respond  would refer him back to spine surgery for consideration of surgical options.       Elieser Verma MD

## 2024-02-23 ENCOUNTER — ANTICOAGULATION - WARFARIN VISIT (OUTPATIENT)
Dept: CARDIOLOGY | Facility: CLINIC | Age: 74
End: 2024-02-23
Payer: MEDICARE

## 2024-02-23 DIAGNOSIS — I26.99 PULMONARY EMBOLISM, UNSPECIFIED CHRONICITY, UNSPECIFIED PULMONARY EMBOLISM TYPE, UNSPECIFIED WHETHER ACUTE COR PULMONALE PRESENT (MULTI): Primary | ICD-10-CM

## 2024-02-23 LAB
POC INR: 2.6
POC PROTHROMBIN TIME: NORMAL

## 2024-02-23 PROCEDURE — 85610 PROTHROMBIN TIME: CPT | Mod: QW

## 2024-02-23 PROCEDURE — 99211 OFF/OP EST MAY X REQ PHY/QHP: CPT | Performed by: INTERNAL MEDICINE

## 2024-02-23 NOTE — PROGRESS NOTES
Patient identification verified with 2 identifiers.    Location: [facility] at [facility] - [address] [address] [phone] option #1     Referring Physician: Dr. Marla Vela  Enrollment/ Re-enrollment date: 3/2/24 - Renewal sent to Dr. Vela 24  INR Goal: 2.0-3.0  INR monitoring is per AMS protocol.  Anticoagulation Medication: warfarin  Indication: Pulmonary Embolism (PE)    Subjective   Bleeding signs/symptoms: No    Bruising: No   Major bleeding event: No  Thrombosis signs/symptoms: No  Thromboembolic event: No  Missed doses: No  Extra doses: No  Medication changes: No  Dietary changes: No  Change in health: No  Change in activity: No  Alcohol: No  Other concerns: No    Upcoming Procedures:  Does the Patient Have any upcoming procedures that require interruption in anticoagulation therapy? no  Does the patient require bridging? no      Anticoagulation Summary  As of 2024      INR goal:  2.0-3.0   TTR:  59.0 % (4.6 mo)   INR used for dosin.60 (2024)   Weekly warfarin total:  33.75 mg               Assessment/Plan   Therapeutic     1. New dose: no change    2. Next INR: 2 weeks        Education provided to patient during the visit:  Patient instructed to call in interim with questions, concerns and changes.   Patient educated on interactions between medications and warfarin.   Patient educated on compliance with dosing, follow up appointments, and prescribed plan of care.

## 2024-03-06 ENCOUNTER — HOSPITAL ENCOUNTER (OUTPATIENT)
Dept: PAIN MEDICINE | Facility: CLINIC | Age: 74
Discharge: HOME | End: 2024-03-06
Payer: MEDICARE

## 2024-03-06 ENCOUNTER — HOSPITAL ENCOUNTER (OUTPATIENT)
Dept: RADIOLOGY | Facility: CLINIC | Age: 74
Discharge: HOME | End: 2024-03-06
Payer: MEDICARE

## 2024-03-06 VITALS
DIASTOLIC BLOOD PRESSURE: 88 MMHG | RESPIRATION RATE: 16 BRPM | SYSTOLIC BLOOD PRESSURE: 140 MMHG | OXYGEN SATURATION: 93 % | TEMPERATURE: 99 F | HEART RATE: 74 BPM

## 2024-03-06 DIAGNOSIS — M48.062 SPINAL STENOSIS OF LUMBAR REGION WITH NEUROGENIC CLAUDICATION: ICD-10-CM

## 2024-03-06 LAB
POCT INTERNATIONAL NORMALIZATION RATIO: 1.2
POCT PROTHROMBIN TIME: 14.8 SECONDS

## 2024-03-06 PROCEDURE — 62323 NJX INTERLAMINAR LMBR/SAC: CPT | Performed by: ANESTHESIOLOGY

## 2024-03-06 PROCEDURE — A4216 STERILE WATER/SALINE, 10 ML: HCPCS

## 2024-03-06 PROCEDURE — 2500000004 HC RX 250 GENERAL PHARMACY W/ HCPCS (ALT 636 FOR OP/ED)

## 2024-03-06 PROCEDURE — 2500000005 HC RX 250 GENERAL PHARMACY W/O HCPCS

## 2024-03-06 RX ORDER — SODIUM CHLORIDE 9 MG/ML
INJECTION, SOLUTION INTRAMUSCULAR; INTRAVENOUS; SUBCUTANEOUS
Status: COMPLETED
Start: 2024-03-06 | End: 2024-03-06

## 2024-03-06 RX ORDER — ROPIVACAINE HYDROCHLORIDE 5 MG/ML
INJECTION, SOLUTION EPIDURAL; INFILTRATION; PERINEURAL
Status: COMPLETED
Start: 2024-03-06 | End: 2024-03-06

## 2024-03-06 RX ORDER — METHYLPREDNISOLONE ACETATE 40 MG/ML
INJECTION, SUSPENSION INTRA-ARTICULAR; INTRALESIONAL; INTRAMUSCULAR; SOFT TISSUE
Status: COMPLETED
Start: 2024-03-06 | End: 2024-03-06

## 2024-03-06 RX ORDER — LIDOCAINE HYDROCHLORIDE 10 MG/ML
INJECTION, SOLUTION EPIDURAL; INFILTRATION; INTRACAUDAL; PERINEURAL
Status: COMPLETED
Start: 2024-03-06 | End: 2024-03-06

## 2024-03-06 RX ADMIN — SODIUM CHLORIDE 10 ML: 9 INJECTION, SOLUTION INTRAMUSCULAR; INTRAVENOUS; SUBCUTANEOUS at 14:14

## 2024-03-06 RX ADMIN — METHYLPREDNISOLONE ACETATE 40 MG: 40 INJECTION, SUSPENSION INTRA-ARTICULAR; INTRALESIONAL; INTRAMUSCULAR; INTRASYNOVIAL; SOFT TISSUE at 14:14

## 2024-03-06 RX ADMIN — ROPIVACAINE HYDROCHLORIDE 100 MG: 5 INJECTION, SOLUTION EPIDURAL; INFILTRATION; PERINEURAL at 14:14

## 2024-03-06 RX ADMIN — LIDOCAINE HYDROCHLORIDE 300 MG: 10 INJECTION, SOLUTION EPIDURAL; INFILTRATION; INTRACAUDAL; PERINEURAL at 14:14

## 2024-03-06 SDOH — ECONOMIC STABILITY: FOOD INSECURITY: WITHIN THE PAST 12 MONTHS, THE FOOD YOU BOUGHT JUST DIDN'T LAST AND YOU DIDN'T HAVE MONEY TO GET MORE.: NEVER TRUE

## 2024-03-06 SDOH — ECONOMIC STABILITY: FOOD INSECURITY: WITHIN THE PAST 12 MONTHS, YOU WORRIED THAT YOUR FOOD WOULD RUN OUT BEFORE YOU GOT MONEY TO BUY MORE.: NEVER TRUE

## 2024-03-06 ASSESSMENT — LIFESTYLE VARIABLES
SKIP TO QUESTIONS 9-10: 1
HOW MANY STANDARD DRINKS CONTAINING ALCOHOL DO YOU HAVE ON A TYPICAL DAY: PATIENT DOES NOT DRINK
HOW OFTEN DO YOU HAVE SIX OR MORE DRINKS ON ONE OCCASION: NEVER
AUDIT-C TOTAL SCORE: 0
HOW OFTEN DO YOU HAVE A DRINK CONTAINING ALCOHOL: NEVER

## 2024-03-06 ASSESSMENT — PAIN SCALES - GENERAL
PAINLEVEL_OUTOF10: 2
PAINLEVEL_OUTOF10: 0 - NO PAIN

## 2024-03-06 ASSESSMENT — PAIN - FUNCTIONAL ASSESSMENT: PAIN_FUNCTIONAL_ASSESSMENT: 0-10

## 2024-03-06 ASSESSMENT — PAIN DESCRIPTION - DESCRIPTORS: DESCRIPTORS: BURNING;TIGHTNESS

## 2024-03-06 ASSESSMENT — ENCOUNTER SYMPTOMS
DEPRESSION: 0
LOSS OF SENSATION IN FEET: 1
OCCASIONAL FEELINGS OF UNSTEADINESS: 1

## 2024-03-06 NOTE — Clinical Note
Prepped with ChloraPrep, a minimum of 3 minute dry time, longer if needed, no pooling noted, patient draped in sterile fashion. BRITTANY

## 2024-03-06 NOTE — OP NOTE
Procedure Note     Date: 3/6/2024  OR Location: [facility] NON-OR PROCEDURES    Name: Louis Rainey, : 1950, Age: 73 y.o., MRN: [id number], Sex: male    Diagnosis  Preprocedure diagnosis: Spinal Stenosis  Postprocedure diagnosis: Same    Procedures  Lumbar Epidural Steroid Injection    The patient was seen in the preoperative area. The risks, benefits, complications, treatment options, non-operative alternatives, expected recovery and outcomes were discussed with the patient. The patient concurred with the proposed plan, giving informed consent.      Procedure Details:   Lumbar Epidural Steroid Injection Procedure Note      Procedure: The risks and benefits of treatment options and alternatives were discussed with the patient, and consent was obtained for a Lumbar epidural steroid injection. He wishes to proceed. He was placed in a prone position. Area overlying the L4-5space was cleaned with ChloraPrep solution and draped using standard sterile precautions. Skin was anesthetized with 1% lidocaine. A 3.5 inch 20 G Touhy needle was advanced with AP, lateral, oblique fluoroscopy to the L4-5epidural space using loss-of-resistance technique. No paresthesias were induced. 2 ml of Omnipaque was injected demonstrating spread of the dye  in the epidural space. No intravascular spread was noticed. After negative aspiration for blood and CSF, a solution containing Depomedrol 40mg, 1 mL of 0.5% ropivacaine and 3 ml of normal saline was injected without inducing paresthesia or pain. Patient was transferred to recovery in stable condition and subsequently discharged home.        Complications:  None; patient tolerated the procedure well.    Disposition: Home  Condition: stable           Additional Details: NA    Attending Attestation: I performed the procedure.    Elieser Verma MD

## 2024-03-08 ENCOUNTER — APPOINTMENT (OUTPATIENT)
Dept: CARDIOLOGY | Facility: CLINIC | Age: 74
End: 2024-03-08
Payer: MEDICARE

## 2024-03-08 ENCOUNTER — ANTICOAGULATION - WARFARIN VISIT (OUTPATIENT)
Dept: CARDIOLOGY | Facility: CLINIC | Age: 74
End: 2024-03-08
Payer: MEDICARE

## 2024-03-08 DIAGNOSIS — I26.99 PULMONARY EMBOLISM, UNSPECIFIED CHRONICITY, UNSPECIFIED PULMONARY EMBOLISM TYPE, UNSPECIFIED WHETHER ACUTE COR PULMONALE PRESENT (MULTI): Primary | ICD-10-CM

## 2024-03-08 NOTE — PROGRESS NOTES
PATIENT CANCELLED TODAY'S APPOINTMENT AS HE HAS TO GO OUT OF TOWN. HE WILL BE BACK LATE IN EVENING 3/15. WILL R/S FOR 3/18/24 PER PT REQUEST/AVAILABILITY.

## 2024-03-18 ENCOUNTER — ANTICOAGULATION - WARFARIN VISIT (OUTPATIENT)
Dept: CARDIOLOGY | Facility: CLINIC | Age: 74
End: 2024-03-18
Payer: MEDICARE

## 2024-03-18 DIAGNOSIS — I26.99 PULMONARY EMBOLISM, UNSPECIFIED CHRONICITY, UNSPECIFIED PULMONARY EMBOLISM TYPE, UNSPECIFIED WHETHER ACUTE COR PULMONALE PRESENT (MULTI): ICD-10-CM

## 2024-03-18 LAB
POC INR: 2
POC PROTHROMBIN TIME: NORMAL

## 2024-03-18 PROCEDURE — 85610 PROTHROMBIN TIME: CPT | Mod: QW

## 2024-03-18 PROCEDURE — 99211 OFF/OP EST MAY X REQ PHY/QHP: CPT

## 2024-03-18 NOTE — PROGRESS NOTES
Patient identification verified with 2 identifiers.    Location: [facility] at [facility] - [address] [address] [phone] option #1     Referring Physician:DR. LAURENCE PHAM  Enrollment/ Re-enrollment date: 2025   INR Goal: 2.0-3.0  INR monitoring is per AMS protocol.  Anticoagulation Medication: warfarin  Indication: Pulmonary Embolism (PE)    Subjective   Bleeding signs/symptoms: No    Bruising: No   Major bleeding event: No  Thrombosis signs/symptoms: No  Thromboembolic event: No  Missed doses: No  Extra doses: No  Medication changes: Yes  PT HAD STEROID INJECTION IN BACK ABOUT 2 WEEKS AGO.HE WAS OFF WARFARIN FOR 4-5 DAYS  Dietary changes: No  Change in health: No  Change in activity: No  Alcohol: No  Other concerns: No    Upcoming Procedures:  Does the Patient Have any upcoming procedures that require interruption in anticoagulation therapy? no  Does the patient require bridging? no      Anticoagulation Summary  As of 3/18/2024      INR goal:  2.0-3.0   TTR:  53.6 % (5.4 mo)   INR used for dosin.00 (3/18/2024)   Weekly warfarin total:  33.75 mg               Assessment/Plan   Therapeutic     1. New dose: no change    2. Next INR: 1 month      Education provided to patient during the visit:  Patient instructed to call in interim with questions, concerns and changes.   Patient educated on interactions between medications and warfarin.   Patient educated on dietary consistency in vitamin k consumption.   Patient educated on affects of alcohol consumption while taking warfarin.   Patient educated on signs of bleeding/clotting.   Patient educated on compliance with dosing, follow up appointments, and prescribed plan of care.

## 2024-03-21 ENCOUNTER — TELEPHONE (OUTPATIENT)
Dept: PAIN MEDICINE | Facility: CLINIC | Age: 74
End: 2024-03-21
Payer: MEDICARE

## 2024-03-21 NOTE — TELEPHONE ENCOUNTER
Pt is scheduled to come in on 4-17, but he called because he is still in a lot of pain and is asking what can be done now?

## 2024-03-26 ENCOUNTER — OFFICE VISIT (OUTPATIENT)
Dept: PAIN MEDICINE | Facility: CLINIC | Age: 74
End: 2024-03-26
Payer: MEDICARE

## 2024-03-26 VITALS
OXYGEN SATURATION: 97 % | SYSTOLIC BLOOD PRESSURE: 138 MMHG | BODY MASS INDEX: 29.66 KG/M2 | TEMPERATURE: 97.7 F | HEART RATE: 62 BPM | RESPIRATION RATE: 16 BRPM | HEIGHT: 72 IN | WEIGHT: 219 LBS | DIASTOLIC BLOOD PRESSURE: 88 MMHG

## 2024-03-26 DIAGNOSIS — M48.062 SPINAL STENOSIS OF LUMBAR REGION WITH NEUROGENIC CLAUDICATION: Primary | ICD-10-CM

## 2024-03-26 PROCEDURE — 1159F MED LIST DOCD IN RCRD: CPT | Performed by: ANESTHESIOLOGY

## 2024-03-26 PROCEDURE — 1160F RVW MEDS BY RX/DR IN RCRD: CPT | Performed by: ANESTHESIOLOGY

## 2024-03-26 PROCEDURE — 1036F TOBACCO NON-USER: CPT | Performed by: ANESTHESIOLOGY

## 2024-03-26 PROCEDURE — 99213 OFFICE O/P EST LOW 20 MIN: CPT | Performed by: ANESTHESIOLOGY

## 2024-03-26 PROCEDURE — 1125F AMNT PAIN NOTED PAIN PRSNT: CPT | Performed by: ANESTHESIOLOGY

## 2024-03-26 RX ORDER — GABAPENTIN 100 MG/1
CAPSULE ORAL
Qty: 111 CAPSULE | Refills: 0 | Status: SHIPPED | OUTPATIENT
Start: 2024-03-26 | End: 2024-05-09

## 2024-03-26 ASSESSMENT — COLUMBIA-SUICIDE SEVERITY RATING SCALE - C-SSRS
1. IN THE PAST MONTH, HAVE YOU WISHED YOU WERE DEAD OR WISHED YOU COULD GO TO SLEEP AND NOT WAKE UP?: NO
2. HAVE YOU ACTUALLY HAD ANY THOUGHTS OF KILLING YOURSELF?: NO
6. HAVE YOU EVER DONE ANYTHING, STARTED TO DO ANYTHING, OR PREPARED TO DO ANYTHING TO END YOUR LIFE?: NO

## 2024-03-26 ASSESSMENT — ENCOUNTER SYMPTOMS
LOSS OF SENSATION IN FEET: 0
SHORTNESS OF BREATH: 0
FEVER: 0
UNEXPECTED WEIGHT CHANGE: 0
DEPRESSION: 0
OCCASIONAL FEELINGS OF UNSTEADINESS: 1
BLOOD IN STOOL: 0

## 2024-03-26 ASSESSMENT — PATIENT HEALTH QUESTIONNAIRE - PHQ9
SUM OF ALL RESPONSES TO PHQ9 QUESTIONS 1 AND 2: 0
1. LITTLE INTEREST OR PLEASURE IN DOING THINGS: NOT AT ALL
2. FEELING DOWN, DEPRESSED OR HOPELESS: NOT AT ALL

## 2024-03-26 ASSESSMENT — PAIN SCALES - GENERAL: PAINLEVEL: 5

## 2024-03-26 NOTE — PROGRESS NOTES
Chief Complain  Follow-up (Back pain )       History Of Present Illness  Louis Rainey is a 73 y.o. male here for low back pain . The patient rates the pain at 2  on a scale from 0-10, when sitting worsen with standing.  The patient describes pain as burning and tightening.  The pain is worsened by standing and walking and is alleviated by sitting.  Since the last visit the pain has stayed the same.  The patient denies any fever, chills, weight loss, bladder/bowel incontinence.       Past Medical History  He has a past medical history of CKD (chronic kidney disease), HTN (hypertension), Other pulmonary embolism without acute cor pulmonale (CMS/HCC) (06/03/2014), and Personal history of other diseases of the musculoskeletal system and connective tissue (04/23/2019).    Surgical History  He has a past surgical history that includes Other surgical history (04/23/2019); Other surgical history (04/23/2019); and Other surgical history (04/23/2019).     Social History  He reports that he has quit smoking. His smoking use included cigarettes. He has been exposed to tobacco smoke. He has never used smokeless tobacco. He reports current alcohol use. He reports that he does not use drugs.    Family History  No family history on file.     Allergies  Cefazolin sodium, Cephalexin, and Cephalosporins    Review of Systems  Review of Systems   Constitutional:  Negative for fever and unexpected weight change.   Respiratory:  Negative for shortness of breath.    Cardiovascular:  Negative for chest pain.   Gastrointestinal:  Negative for blood in stool.   Genitourinary:         -ve for bladder or bowel incontinence    Neurological:         -ve Balance issues/fine motor skill problems   Psychiatric/Behavioral:  Negative for suicidal ideas.         Physical Exam  Physical Exam  HENT:      Head: Normocephalic and atraumatic.   Eyes:      Extraocular Movements: Extraocular movements intact.   Pulmonary:      Effort: Pulmonary effort  is normal.   Musculoskeletal:      Cervical back: Neck supple.   Neurological:      Mental Status: He is alert.   Psychiatric:         Mood and Affect: Mood normal.           Last Recorded Vitals  Blood pressure 138/88, pulse 62, temperature 36.5 °C (97.7 °F), temperature source Temporal, resp. rate 16, height 1.829 m (6'), weight 99.3 kg (219 lb), SpO2 97 %.       Assessment/Plan     Louis Rainey is a 73 y.o. male here for follow-up of right low back pain.  The pain is worse with standing and walking improved sitting down.  He is status post L4-5 which provided him with significant relief of pain lasting for couple of days.  On review of his CT scan suspect significant spinal canal stenosis.  I would get an MRI for further evaluation.  I would also trial him on gabapentin, discussed the risk benefits.  Follow-up after completion of MRI lumbar spine.    Total time spent caring for the patient today was 20 minutes. This includes time spent before the visit reviewing the chart, time spent during the visit, and time spent after the visit on documentation.      Elieser Verma MD

## 2024-04-03 ENCOUNTER — HOSPITAL ENCOUNTER (OUTPATIENT)
Dept: RADIOLOGY | Facility: HOSPITAL | Age: 74
Discharge: HOME | End: 2024-04-03
Payer: MEDICARE

## 2024-04-03 DIAGNOSIS — M48.062 SPINAL STENOSIS OF LUMBAR REGION WITH NEUROGENIC CLAUDICATION: ICD-10-CM

## 2024-04-03 PROCEDURE — 72148 MRI LUMBAR SPINE W/O DYE: CPT

## 2024-04-03 PROCEDURE — 72148 MRI LUMBAR SPINE W/O DYE: CPT | Performed by: RADIOLOGY

## 2024-04-04 DIAGNOSIS — M48.062 SPINAL STENOSIS OF LUMBAR REGION WITH NEUROGENIC CLAUDICATION: Primary | ICD-10-CM

## 2024-04-04 DIAGNOSIS — M43.9 COMPRESSION DEFORMITY OF VERTEBRA: ICD-10-CM

## 2024-04-05 ENCOUNTER — TELEPHONE (OUTPATIENT)
Dept: PAIN MEDICINE | Facility: CLINIC | Age: 74
End: 2024-04-05
Payer: MEDICARE

## 2024-04-05 NOTE — TELEPHONE ENCOUNTER
Patient said can you put the referral in  ----- Message from Elieser Verma MD sent at 4/4/2024  2:35 PM EDT -----  Reviewed the results of her MRI lumbar spine does appear to have subacute compression deformity of L4 as well as severe spinal canal stenosis.  Recommend referral to spine surgery for their opinion in this regard.  ----- Message -----  From: Interface, Radiology Results In  Sent: 4/4/2024  10:08 AM EDT  To: Elieser Verma MD

## 2024-04-17 ENCOUNTER — APPOINTMENT (OUTPATIENT)
Dept: PAIN MEDICINE | Facility: CLINIC | Age: 74
End: 2024-04-17
Payer: MEDICARE

## 2024-04-19 ENCOUNTER — ANTICOAGULATION - WARFARIN VISIT (OUTPATIENT)
Dept: CARDIOLOGY | Facility: CLINIC | Age: 74
End: 2024-04-19
Payer: MEDICARE

## 2024-04-19 DIAGNOSIS — I26.99 PULMONARY EMBOLISM, UNSPECIFIED CHRONICITY, UNSPECIFIED PULMONARY EMBOLISM TYPE, UNSPECIFIED WHETHER ACUTE COR PULMONALE PRESENT (MULTI): Primary | ICD-10-CM

## 2024-04-19 LAB
POC INR: 2
POC PROTHROMBIN TIME: NORMAL

## 2024-04-19 PROCEDURE — 99211 OFF/OP EST MAY X REQ PHY/QHP: CPT

## 2024-04-19 PROCEDURE — 85610 PROTHROMBIN TIME: CPT | Mod: QW

## 2024-04-19 NOTE — PROGRESS NOTES
Patient identification verified with 2 identifiers.    Location: [facility] at [facility] - [address] [address] [phone] option #1     Referring Physician: DR. PHAM  Enrollment/ Re-enrollment date: 25   INR Goal: 2.0-3.0  INR monitoring is per [facility] protocol.  Anticoagulation Medication: warfarin  Indication: Pulmonary Embolism (PE)    Subjective   Bleeding signs/symptoms: No    Bruising: No   Major bleeding event: No  Thrombosis signs/symptoms: No  Thromboembolic event: No  Missed doses: No  Extra doses: No  Medication changes: Yes  PT HAS BEEN ON GABAPENTIN DAILY SINCE END .  Dietary changes: No  Change in health: No  Change in activity: No  Alcohol: No  Other concerns: No    Upcoming Procedures:  Does the Patient Have any upcoming procedures that require interruption in anticoagulation therapy? no  Does the patient require bridging? no      Anticoagulation Summary  As of 2024      INR goal:  2.0-3.0   TTR:  61.4% (6.4 mo)   INR used for dosin.00 (2024)   Weekly warfarin total:  33.75 mg               Assessment/Plan   Therapeutic     1. New dose: no change    2. Next INR: 1 month      Education provided to patient during the visit:  Patient instructed to call in interim with questions, concerns and changes.   Patient educated on interactions between medications and warfarin.   Patient educated on dietary consistency in vitamin k consumption.   Patient educated on signs of bleeding/clotting.
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
## 2024-05-13 ENCOUNTER — TELEPHONE (OUTPATIENT)
Dept: RADIATION ONCOLOGY | Facility: HOSPITAL | Age: 74
End: 2024-05-13
Payer: MEDICARE

## 2024-05-14 ENCOUNTER — LAB (OUTPATIENT)
Dept: LAB | Facility: HOSPITAL | Age: 74
End: 2024-05-14
Payer: MEDICARE

## 2024-05-14 ENCOUNTER — DOCUMENTATION (OUTPATIENT)
Dept: RADIATION ONCOLOGY | Facility: HOSPITAL | Age: 74
End: 2024-05-14

## 2024-05-14 ENCOUNTER — OFFICE VISIT (OUTPATIENT)
Dept: NEUROSURGERY | Facility: CLINIC | Age: 74
End: 2024-05-14
Payer: MEDICARE

## 2024-05-14 ENCOUNTER — HOSPITAL ENCOUNTER (OUTPATIENT)
Dept: RADIOLOGY | Facility: CLINIC | Age: 74
Discharge: HOME | End: 2024-05-14
Payer: MEDICARE

## 2024-05-14 ENCOUNTER — HOSPITAL ENCOUNTER (OUTPATIENT)
Dept: RADIATION ONCOLOGY | Facility: HOSPITAL | Age: 74
Setting detail: RADIATION/ONCOLOGY SERIES
Discharge: HOME | End: 2024-05-14
Payer: MEDICARE

## 2024-05-14 VITALS
WEIGHT: 223.11 LBS | TEMPERATURE: 96.6 F | RESPIRATION RATE: 18 BRPM | DIASTOLIC BLOOD PRESSURE: 83 MMHG | SYSTOLIC BLOOD PRESSURE: 131 MMHG | HEART RATE: 86 BPM | BODY MASS INDEX: 30.26 KG/M2 | OXYGEN SATURATION: 96 %

## 2024-05-14 VITALS
BODY MASS INDEX: 29.39 KG/M2 | WEIGHT: 217 LBS | DIASTOLIC BLOOD PRESSURE: 83 MMHG | HEART RATE: 74 BPM | RESPIRATION RATE: 17 BRPM | SYSTOLIC BLOOD PRESSURE: 124 MMHG | HEIGHT: 72 IN | TEMPERATURE: 97.8 F

## 2024-05-14 DIAGNOSIS — M47.816 LUMBAR SPONDYLOSIS: Primary | ICD-10-CM

## 2024-05-14 DIAGNOSIS — C61 MALIGNANT NEOPLASM OF PROSTATE (MULTI): ICD-10-CM

## 2024-05-14 DIAGNOSIS — M48.062 SPINAL STENOSIS OF LUMBAR REGION WITH NEUROGENIC CLAUDICATION: ICD-10-CM

## 2024-05-14 DIAGNOSIS — C61 MALIGNANT NEOPLASM OF PROSTATE (MULTI): Primary | ICD-10-CM

## 2024-05-14 DIAGNOSIS — S32.040A COMPRESSION FRACTURE OF L4 VERTEBRA, INITIAL ENCOUNTER (MULTI): ICD-10-CM

## 2024-05-14 DIAGNOSIS — M81.0 AGE-RELATED OSTEOPOROSIS WITHOUT CURRENT PATHOLOGICAL FRACTURE: ICD-10-CM

## 2024-05-14 DIAGNOSIS — M43.9 COMPRESSION DEFORMITY OF VERTEBRA: ICD-10-CM

## 2024-05-14 LAB
ALBUMIN SERPL BCP-MCNC: 4 G/DL (ref 3.4–5)
ALP SERPL-CCNC: 57 U/L (ref 33–136)
ALT SERPL W P-5'-P-CCNC: 31 U/L (ref 10–52)
ANION GAP SERPL CALC-SCNC: 12 MMOL/L (ref 10–20)
AST SERPL W P-5'-P-CCNC: 43 U/L (ref 9–39)
BASOPHILS # BLD AUTO: 0.02 X10*3/UL (ref 0–0.1)
BASOPHILS NFR BLD AUTO: 0.4 %
BILIRUB SERPL-MCNC: 0.4 MG/DL (ref 0–1.2)
BUN SERPL-MCNC: 21 MG/DL (ref 6–23)
CALCIUM SERPL-MCNC: 8.9 MG/DL (ref 8.6–10.3)
CHLORIDE SERPL-SCNC: 107 MMOL/L (ref 98–107)
CO2 SERPL-SCNC: 24 MMOL/L (ref 21–32)
CREAT SERPL-MCNC: 1.47 MG/DL (ref 0.5–1.3)
EGFRCR SERPLBLD CKD-EPI 2021: 49 ML/MIN/1.73M*2
EOSINOPHIL # BLD AUTO: 0.18 X10*3/UL (ref 0–0.4)
EOSINOPHIL NFR BLD AUTO: 4 %
ERYTHROCYTE [DISTWIDTH] IN BLOOD BY AUTOMATED COUNT: 15.6 % (ref 11.5–14.5)
GLUCOSE SERPL-MCNC: 95 MG/DL (ref 74–99)
HCT VFR BLD AUTO: 37.9 % (ref 41–52)
HGB BLD-MCNC: 12.5 G/DL (ref 13.5–17.5)
IMM GRANULOCYTES # BLD AUTO: 0.01 X10*3/UL (ref 0–0.5)
IMM GRANULOCYTES NFR BLD AUTO: 0.2 % (ref 0–0.9)
LYMPHOCYTES # BLD AUTO: 1.36 X10*3/UL (ref 0.8–3)
LYMPHOCYTES NFR BLD AUTO: 30.1 %
MCH RBC QN AUTO: 29.3 PG (ref 26–34)
MCHC RBC AUTO-ENTMCNC: 33 G/DL (ref 32–36)
MCV RBC AUTO: 89 FL (ref 80–100)
MONOCYTES # BLD AUTO: 0.35 X10*3/UL (ref 0.05–0.8)
MONOCYTES NFR BLD AUTO: 7.7 %
NEUTROPHILS # BLD AUTO: 2.6 X10*3/UL (ref 1.6–5.5)
NEUTROPHILS NFR BLD AUTO: 57.6 %
NRBC BLD-RTO: 0 /100 WBCS (ref 0–0)
PLATELET # BLD AUTO: 205 X10*3/UL (ref 150–450)
POTASSIUM SERPL-SCNC: 4.1 MMOL/L (ref 3.5–5.3)
PROT SERPL-MCNC: 7.1 G/DL (ref 6.4–8.2)
PSA SERPL-MCNC: <0.1 NG/ML
RBC # BLD AUTO: 4.26 X10*6/UL (ref 4.5–5.9)
SODIUM SERPL-SCNC: 139 MMOL/L (ref 136–145)
TESTOST SERPL-MCNC: 346 NG/DL (ref 240–1000)
WBC # BLD AUTO: 4.5 X10*3/UL (ref 4.4–11.3)

## 2024-05-14 PROCEDURE — 72100 X-RAY EXAM L-S SPINE 2/3 VWS: CPT

## 2024-05-14 PROCEDURE — 99215 OFFICE O/P EST HI 40 MIN: CPT

## 2024-05-14 PROCEDURE — 84153 ASSAY OF PSA TOTAL: CPT | Mod: 91

## 2024-05-14 PROCEDURE — 99215 OFFICE O/P EST HI 40 MIN: CPT | Performed by: NEUROLOGICAL SURGERY

## 2024-05-14 PROCEDURE — 84075 ASSAY ALKALINE PHOSPHATASE: CPT

## 2024-05-14 PROCEDURE — 84153 ASSAY OF PSA TOTAL: CPT | Mod: MUE

## 2024-05-14 PROCEDURE — 84154 ASSAY OF PSA FREE: CPT

## 2024-05-14 PROCEDURE — 85025 COMPLETE CBC W/AUTO DIFF WBC: CPT

## 2024-05-14 PROCEDURE — 99214 OFFICE O/P EST MOD 30 MIN: CPT

## 2024-05-14 PROCEDURE — 84403 ASSAY OF TOTAL TESTOSTERONE: CPT

## 2024-05-14 PROCEDURE — 36415 COLL VENOUS BLD VENIPUNCTURE: CPT

## 2024-05-14 ASSESSMENT — ENCOUNTER SYMPTOMS
COUGH: 0
DIZZINESS: 0
ALLERGIC/IMMUNOLOGIC NEGATIVE: 1
UNEXPECTED WEIGHT CHANGE: 0
DYSURIA: 0
PSYCHIATRIC NEGATIVE: 1
HEMATURIA: 0
DIFFICULTY URINATING: 0
FEVER: 0
BLOOD IN STOOL: 0
CONSTIPATION: 0
CHEST TIGHTNESS: 0
ABDOMINAL PAIN: 0
OCCASIONAL FEELINGS OF UNSTEADINESS: 0
BACK PAIN: 0
SHORTNESS OF BREATH: 0
RECTAL PAIN: 0
FREQUENCY: 0
ANAL BLEEDING: 0
LOSS OF SENSATION IN FEET: 0
JOINT SWELLING: 0
ARTHRALGIAS: 0
FATIGUE: 0
DEPRESSION: 0
WEAKNESS: 0
DIARRHEA: 0
PALPITATIONS: 0

## 2024-05-14 ASSESSMENT — PATIENT HEALTH QUESTIONNAIRE - PHQ9
1. LITTLE INTEREST OR PLEASURE IN DOING THINGS: NOT AT ALL
2. FEELING DOWN, DEPRESSED OR HOPELESS: NOT AT ALL
SUM OF ALL RESPONSES TO PHQ9 QUESTIONS 1 AND 2: 0

## 2024-05-14 ASSESSMENT — COLUMBIA-SUICIDE SEVERITY RATING SCALE - C-SSRS
6. HAVE YOU EVER DONE ANYTHING, STARTED TO DO ANYTHING, OR PREPARED TO DO ANYTHING TO END YOUR LIFE?: NO
2. HAVE YOU ACTUALLY HAD ANY THOUGHTS OF KILLING YOURSELF?: NO
1. IN THE PAST MONTH, HAVE YOU WISHED YOU WERE DEAD OR WISHED YOU COULD GO TO SLEEP AND NOT WAKE UP?: NO

## 2024-05-14 ASSESSMENT — PAIN SCALES - GENERAL: PAINLEVEL: 3

## 2024-05-14 NOTE — PROGRESS NOTES
Cancer synopsis:  Rad/onc: Karissa Rivera/ [facility] Navarrete  Med/onc: Dr. Marques/ [facility] Soler     Prostate Cancer - Local  Treatment  -Initial PSA 7.87, prostate biopsy consistent with [facility] 4+5 = 9, grade group 5, MRI February 2023 no evidence of extracapsular extension or lymph node involvement, PET PSMA negative for distant metastases  -Potential discussion of [facility] 1821, use of SBRT with abiraterone, ADT, PARP inhibition    - 3/2/23 C1D1 on above trial    - Completed RT to prostate SV and LN on 5/15/23     History of presenting illness:    Patient ID: [id number]     Louis Rainey is a 73 y.o. male who presents to me for his prostate cancer GS 4+5=9, IPSA 7.87 now s/p RT. Enrolled in [id number]    RT Site: prostate and SV  RT Date: 05/15/2023  Hormone therapy: Yes, AA/p, ADT, and naraparib. ([id number] protocol), done 09/2023  Hot Flushes: Denies.  Fatigue: Denies  Bone pain: Admits to lower back pain that has been treated with PT.  ED: Admits to loss of sexual since use of systemic therapy. Drive is still low per patient.  - Quality of erections during the last 4 weeks: 1 = None at all  - Use of erectile dysfunction medications:  None  IPSS: 10  Urinary symptoms: Denies dysuria, hematuria or urine leakage. Denies incomplete bladder or frequency. Does report some urgency at times most notably with ETOH consumption and increased fluid intake. Denies dysuria, hematuria, or urine leakage. Nocturia 2 times a night. Does report some urgency that has been ongoing for several months now. Delighted with QOL in regards to urination.   Urinary Medications: No  Rectal bleeding: Denies  Colonoscopy: 03/2015 multiple polyps removed next due now (five years late at this time)  Other systems: Denies SOB, CP or fever.      Review of systems:  Review of Systems   Constitutional:  Negative for fatigue, fever and unexpected weight change.   Respiratory:  Negative for cough, chest tightness and shortness of breath.    Cardiovascular:   Negative for chest pain, palpitations and leg swelling.   Gastrointestinal:  Negative for abdominal pain, anal bleeding, blood in stool, constipation, diarrhea and rectal pain.   Endocrine: Negative for cold intolerance, heat intolerance and polyuria.   Genitourinary:  Negative for decreased urine volume, difficulty urinating, dysuria, frequency, hematuria and urgency.        Refer to HPI   Musculoskeletal:  Negative for arthralgias, back pain, gait problem and joint swelling.   Skin: Negative.    Allergic/Immunologic: Negative.    Neurological:  Negative for dizziness, syncope and weakness.   Psychiatric/Behavioral: Negative.       PERFORMANCE STATUS:  KPS/ECO, Normal activity with effort; some signs or symptoms of disease (ECOG equivalent 1)    Past Medical history  Past Medical History:   Diagnosis Date    CKD (chronic kidney disease)     HTN (hypertension)     Other pulmonary embolism without acute cor pulmonale (Multi) 2014    Pulmonary embolism    Personal history of other diseases of the musculoskeletal system and connective tissue 2019    History of arthritis        Surgical/family history  No family history on file.   Past Surgical History:   Procedure Laterality Date    OTHER SURGICAL HISTORY  2019    Knee surgery    OTHER SURGICAL HISTORY  2019    Shoulder surgery    OTHER SURGICAL HISTORY  2019    Gallbladder surgery        Social History  Tobacco Use: Medium Risk (2024)    Patient History     Smoking Tobacco Use: Former     Smokeless Tobacco Use: Never     Passive Exposure: Past         Current med list:  Current Outpatient Medications   Medication Instructions    amLODIPine (NORVASC) 5 mg, oral, Daily    calcium carbonate (Oscal) 500 mg calcium (1,250 mg) tablet 1 tablet, oral, Daily    cholecalciferol (Vitamin D-3) 10 mcg (400 unit) capsule 1 capsule, oral, Daily    cyanocobalamin (VITAMIN B-12) 1,000 mcg, oral, Daily    gabapentin (Neurontin) 100 mg capsule  Take 1 capsule (100 mg) by mouth once daily at bedtime for 7 days, THEN 1 capsule (100 mg) 2 times a day for 7 days, THEN 1 capsule (100 mg) 3 times a day.    multivitamin tablet 1 tablet, oral, Daily    orphenadrine (NORFLEX) 100 mg, oral, 2 times daily PRN, Do not crush, chew, or split.    warfarin (Coumadin) 7.5 mg tablet Take as directed per After Visit Summary.        Last recorded vital:  /83   Pulse 86   Temp 35.9 °C (96.6 °F) (Temporal)   Resp 18   Wt 101 kg (223 lb 1.7 oz)   SpO2 96%   BMI 30.26 kg/m²     Physical exam  Physical Exam  Constitutional:       Appearance: Normal appearance.   Cardiovascular:      Rate and Rhythm: Normal rate.   Pulmonary:      Effort: Pulmonary effort is normal.      Breath sounds: Normal breath sounds.   Musculoskeletal:         General: Normal range of motion.      Cervical back: Normal range of motion.   Neurological:      Mental Status: He is alert and oriented to person, place, and time.   Psychiatric:         Mood and Affect: Mood normal.         Behavior: Behavior normal.         Thought Content: Thought content normal.         Judgment: Judgment normal.       ECOG:  ECOG 0    Pertinent labs:  Prostate Specific AG   Date/Time Value Ref Range Status   02/13/2024 02:58 PM <0.10 <=4.00 ng/mL Final         Dx:  Problem List Items Addressed This Visit    None  Visit Diagnoses       Malignant neoplasm of prostate (Multi)    -  Primary    Relevant Orders    PSA, Total and Free    Testosterone    Comprehensive Metabolic Panel (Completed)    CBC and Auto Differential (Completed)           Discussed need for labs today per [id number]. Review of latent SE including rectal bleeding, hematuria, urinary strictures, ED where reviewed as well as how to contact office if s/s present. Denies latent SE. Current ED related to low testerone from systemic therapy and will re-asses with rise into normal of testerone. NCCN guidelines where reviewed and routine FUV of every 3m for  first year and every 6m for four years for a total of five years was discussed. Patient verbalized understanding.      Recommend vitamin D supplementation, start (or increase by) 400-1000 units daily. Reviewed importance of intake while on Testerone lowering therapy as well as after until Testerone re-establishes, at which point may stop if DEXA indicative of normal bone density. Also reviewed that individuals at a higher risk such as those over the age of 75 or those with a hx of bone fx, osteoporosis or osteopenia to continue supplementation indefinitely with routine DEXA imaging as per national guidelines to assess bone health continually.    Consider DEXA per national guidelines as well as testerone lowering therapy hx.    Reviewed guidelines for colonoscopy. Consider having done in next year    PLAN:  FUV 6m  Labs per KRYSTAL Mcdaniel1 (CBC, testerone/PSA,cmp)  Imaging Consider DEXA (orders are placed), Mri per Dr. Atkinson for nrsgy   Screening: Have colonoscopy done  FUV other providers: PCP for routine evals, Dr atkinson for nrsgy        Please contact office with any concerns:  Paul Cannon CNP  [phone]

## 2024-05-14 NOTE — RESEARCH NOTES
STUDY: [id number]  VISIT: 9M    Clinical Research Specialist saw patient in clinic today.    Adverse Events and Concomitant Medications assessed.    Next appointment and contact information provided.    All questions answered to patient satisfaction.    Radha Stroud  05/14/2024

## 2024-05-15 ENCOUNTER — TELEPHONE (OUTPATIENT)
Dept: RADIATION ONCOLOGY | Facility: HOSPITAL | Age: 74
End: 2024-05-15
Payer: MEDICARE

## 2024-05-15 NOTE — TELEPHONE ENCOUNTER
Reviewed PSA of <0.10 and testerone in normal range. Plan for FUV in 3m per trial then will move to every 6. Discussed mild anemia likely related to poor renal function. Discussed increased fluids and management of HTN and diet.

## 2024-05-17 ENCOUNTER — ANTICOAGULATION - WARFARIN VISIT (OUTPATIENT)
Dept: CARDIOLOGY | Facility: CLINIC | Age: 74
End: 2024-05-17
Payer: MEDICARE

## 2024-05-17 DIAGNOSIS — I26.99 PULMONARY EMBOLISM, UNSPECIFIED CHRONICITY, UNSPECIFIED PULMONARY EMBOLISM TYPE, UNSPECIFIED WHETHER ACUTE COR PULMONALE PRESENT (MULTI): Primary | ICD-10-CM

## 2024-05-17 LAB
POC INR: 1.9
POC PROTHROMBIN TIME: NORMAL
PSA FREE MFR SERPL: NORMAL %
PSA FREE SERPL-MCNC: <0.1 NG/ML
PSA SERPL IA-MCNC: <0.1 NG/ML (ref 0–4)

## 2024-05-17 PROCEDURE — 85610 PROTHROMBIN TIME: CPT | Mod: QW

## 2024-05-17 PROCEDURE — 99211 OFF/OP EST MAY X REQ PHY/QHP: CPT

## 2024-05-17 NOTE — PROGRESS NOTES
Patient identification verified with 2 identifiers.    Location: [facility] at [facility] - [address] [address] [phone] option #1     Referring Physician: DR. PHAM  Enrollment/ Re-enrollment date: 25   INR Goal: 2.0-3.0  INR monitoring is per [facility] protocol.  Anticoagulation Medication: warfarin  Indication: Pulmonary Embolism (PE)    Subjective   Bleeding signs/symptoms: No    Bruising: No   Major bleeding event: No  Thrombosis signs/symptoms: No  Thromboembolic event: No  Missed doses: No  Extra doses: No  Medication changes: No  Dietary changes: Yes  Patient reports eating more salds recently but will resume his normal green intake.  Change in health: No  Change in activity: No  Alcohol: No  Other concerns: No    Upcoming Procedures:  Does the Patient Have any upcoming procedures that require interruption in anticoagulation therapy? no  Does the patient require bridging? no      Anticoagulation Summary  As of 2024      INR goal:  2.0-3.0   TTR:  53.8% (7.4 mo)   INR used for dosin.90 (2024)   Weekly warfarin total:  33.75 mg               Assessment/Plan   Subtherapeutic     1. New dose: Will maintain dose and patient will return in 1 week.    2. Next INR: 1 week      Education provided to patient during the visit:  Patient instructed to call in interim with questions, concerns and changes.

## 2024-05-20 ENCOUNTER — DOCUMENTATION (OUTPATIENT)
Dept: HEMATOLOGY/ONCOLOGY | Facility: HOSPITAL | Age: 74
End: 2024-05-20
Payer: MEDICARE

## 2024-05-20 PROBLEM — M48.062 SPINAL STENOSIS OF LUMBAR REGION WITH NEUROGENIC CLAUDICATION: Status: ACTIVE | Noted: 2024-05-14

## 2024-05-20 PROBLEM — S32.040A COMPRESSION FRACTURE OF FOURTH LUMBAR VERTEBRA (MULTI): Status: ACTIVE | Noted: 2024-05-14

## 2024-05-20 NOTE — PROGRESS NOTES
Left vm for pt that a follow up apt w/ Dr. Marques has been scheduled on 6/4/24 at 2:20pm at [facility]. Instructed to call to confirm or if need to change apt at [phone].

## 2024-05-22 ENCOUNTER — TELEPHONE (OUTPATIENT)
Dept: HEMATOLOGY/ONCOLOGY | Facility: HOSPITAL | Age: 74
End: 2024-05-22
Payer: MEDICARE

## 2024-05-22 NOTE — TELEPHONE ENCOUNTER
Returned phone call    Discussed in detail the appointment was scheduled for the request of the neurosurgeon he will reach out to the neurosurgeon to see if they need to follow-up with us or he would just follow-up with radiation oncology his PSA remains suppressed.

## 2024-05-24 ENCOUNTER — ANTICOAGULATION - WARFARIN VISIT (OUTPATIENT)
Dept: CARDIOLOGY | Facility: CLINIC | Age: 74
End: 2024-05-24
Payer: MEDICARE

## 2024-05-24 DIAGNOSIS — I26.99 PULMONARY EMBOLISM, UNSPECIFIED CHRONICITY, UNSPECIFIED PULMONARY EMBOLISM TYPE, UNSPECIFIED WHETHER ACUTE COR PULMONALE PRESENT (MULTI): ICD-10-CM

## 2024-05-24 PROBLEM — N52.9 ERECTILE DYSFUNCTION: Status: ACTIVE | Noted: 2024-05-24

## 2024-05-24 PROBLEM — K21.9 GASTROESOPHAGEAL REFLUX DISEASE: Status: ACTIVE | Noted: 2024-05-24

## 2024-05-24 PROBLEM — K62.5 RECTAL BLEEDING: Status: ACTIVE | Noted: 2024-05-24

## 2024-05-24 PROBLEM — M70.61 TROCHANTERIC BURSITIS OF RIGHT HIP: Status: ACTIVE | Noted: 2024-05-24

## 2024-05-24 PROBLEM — R97.20 ELEVATED PSA: Status: ACTIVE | Noted: 2024-05-24

## 2024-05-24 PROBLEM — S09.90XA CLOSED HEAD INJURY: Status: ACTIVE | Noted: 2024-05-24

## 2024-05-24 PROBLEM — R06.09 DYSPNEA ON EXERTION: Status: ACTIVE | Noted: 2024-05-24

## 2024-05-24 PROBLEM — N18.30 STAGE 3 CHRONIC KIDNEY DISEASE (MULTI): Status: ACTIVE | Noted: 2024-05-24

## 2024-05-24 PROBLEM — Z20.822 CONTACT WITH AND (SUSPECTED) EXPOSURE TO COVID-19: Status: ACTIVE | Noted: 2021-04-21

## 2024-05-24 PROBLEM — S06.0XAA CONCUSSION WITH LOSS OF CONSCIOUSNESS STATUS UNKNOWN, INITIAL ENCOUNTER: Status: ACTIVE | Noted: 2023-08-24

## 2024-05-24 PROBLEM — I48.0 PAROXYSMAL ATRIAL FIBRILLATION (MULTI): Status: ACTIVE | Noted: 2024-05-24

## 2024-05-24 PROBLEM — I10 ESSENTIAL (PRIMARY) HYPERTENSION: Status: ACTIVE | Noted: 2021-04-28

## 2024-05-24 PROBLEM — M54.2 CERVICAL PAIN: Status: ACTIVE | Noted: 2024-05-24

## 2024-05-24 PROBLEM — J06.9 VIRAL URI WITH COUGH: Status: ACTIVE | Noted: 2024-05-24

## 2024-05-24 PROBLEM — S76.012A HIP STRAIN, LEFT, INITIAL ENCOUNTER: Status: ACTIVE | Noted: 2024-05-24

## 2024-05-24 PROBLEM — M79.669 CALF PAIN: Status: ACTIVE | Noted: 2024-05-24

## 2024-05-24 PROBLEM — B02.9 SHINGLES: Status: ACTIVE | Noted: 2024-05-24

## 2024-05-24 PROBLEM — I49.9 IRREGULAR HEARTBEAT: Status: ACTIVE | Noted: 2024-05-24

## 2024-05-24 PROBLEM — R39.9 LOWER URINARY TRACT SYMPTOMS: Status: ACTIVE | Noted: 2024-05-24

## 2024-05-24 PROBLEM — L23.7 POISON IVY: Status: ACTIVE | Noted: 2024-05-24

## 2024-05-24 PROBLEM — I49.9 CARDIAC ARRHYTHMIA: Status: ACTIVE | Noted: 2022-09-23

## 2024-05-24 PROBLEM — R43.8 METALLIC TASTE: Status: ACTIVE | Noted: 2024-05-24

## 2024-05-24 PROBLEM — M16.9 OSTEOARTHRITIS OF HIP: Status: ACTIVE | Noted: 2024-05-24

## 2024-05-24 PROBLEM — M25.511 PAIN OF RIGHT SHOULDER REGION: Status: ACTIVE | Noted: 2024-05-24

## 2024-05-24 PROBLEM — R42 DIZZINESS AND GIDDINESS: Status: ACTIVE | Noted: 2023-08-13

## 2024-05-24 PROBLEM — I47.29 NONSUSTAINED VENTRICULAR TACHYCARDIA (MULTI): Status: ACTIVE | Noted: 2022-02-16

## 2024-05-24 PROBLEM — D64.9 ANEMIA: Status: ACTIVE | Noted: 2024-05-24

## 2024-05-24 PROBLEM — C61 ADENOCARCINOMA OF PROSTATE (MULTI): Status: ACTIVE | Noted: 2024-05-24

## 2024-05-24 PROBLEM — M70.70 BURSITIS OF HIP: Status: ACTIVE | Noted: 2024-05-24

## 2024-05-24 PROBLEM — R55 SYNCOPE AND COLLAPSE: Status: ACTIVE | Noted: 2022-02-10

## 2024-05-24 PROBLEM — K64.9 HEMORRHOIDS: Status: ACTIVE | Noted: 2024-05-24

## 2024-05-24 PROBLEM — Z11.52 ENCOUNTER FOR SCREENING FOR COVID-19: Status: ACTIVE | Noted: 2022-10-19

## 2024-05-24 PROBLEM — K22.70 BARRETT'S ESOPHAGUS: Status: ACTIVE | Noted: 2021-04-28

## 2024-05-24 PROBLEM — M81.0 SENILE OSTEOPOROSIS: Status: ACTIVE | Noted: 2024-05-24
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
## 2024-05-24 PROBLEM — S69.92XA INJURY OF LEFT WRIST: Status: ACTIVE | Noted: 2024-05-24

## 2024-05-24 PROBLEM — R97.20 INCREASED PROSTATE SPECIFIC ANTIGEN (PSA) VELOCITY: Status: ACTIVE | Noted: 2024-05-24

## 2024-05-24 PROBLEM — K40.91 INGUINAL HERNIA RECURRENT UNILATERAL: Status: ACTIVE | Noted: 2024-05-24

## 2024-05-24 PROBLEM — I49.8 BIGEMINY: Status: ACTIVE | Noted: 2022-02-16

## 2024-05-24 PROBLEM — M25.579 ACUTE ANKLE PAIN: Status: ACTIVE | Noted: 2024-05-24

## 2024-05-24 PROBLEM — H90.3 SENSORINEURAL HEARING LOSS (SNHL) OF BOTH EARS: Status: ACTIVE | Noted: 2024-05-24

## 2024-05-24 PROBLEM — R10.30 INGUINAL PAIN: Status: ACTIVE | Noted: 2023-08-13

## 2024-05-24 PROBLEM — Z86.711 HISTORY OF PULMONARY EMBOLISM: Status: ACTIVE | Noted: 2024-05-24

## 2024-05-24 PROBLEM — R36.1 BLOOD IN SEMEN: Status: ACTIVE | Noted: 2024-05-24

## 2024-05-24 PROBLEM — S46.919A MUSCLE STRAIN OF SCAPULAR REGION: Status: ACTIVE | Noted: 2024-05-24

## 2024-05-24 LAB
POC INR: 1.6
POC PROTHROMBIN TIME: NORMAL

## 2024-05-24 PROCEDURE — 99211 OFF/OP EST MAY X REQ PHY/QHP: CPT

## 2024-05-24 PROCEDURE — 85610 PROTHROMBIN TIME: CPT | Mod: QW

## 2024-05-24 RX ORDER — AMLODIPINE BESYLATE 5 MG/1
5 TABLET ORAL DAILY
COMMUNITY
Start: 2024-04-10

## 2024-05-24 RX ORDER — NICOTINE POLACRILEX 4 MG
LOZENGE BUCCAL
COMMUNITY
Start: 2024-02-26

## 2024-05-24 RX ORDER — TIZANIDINE 4 MG/1
TABLET ORAL EVERY 8 HOURS
COMMUNITY
Start: 2023-12-12

## 2024-05-24 NOTE — PROGRESS NOTES
Patient identification verified with 2 identifiers.    Location: [facility] at [facility] - [address] [address] [phone] option #1      Referring Physician: DR. PHAM  Enrollment/ Re-enrollment date: 25   INR Goal: 2.0-3.0  INR monitoring is per [facility] protocol.  Anticoagulation Medication: warfarin  Indication: Pulmonary Embolism (PE)    Subjective   Bleeding signs/symptoms: No    Bruising: No   Major bleeding event: No  Thrombosis signs/symptoms: No  Thromboembolic event: No  Missed doses: No  Extra doses: No  Medication changes: No  Dietary changes: No  Change in health: No  Change in activity: No  Alcohol: No  Other concerns: No    Upcoming Procedures:  Does the Patient Have any upcoming procedures that require interruption in anticoagulation therapy? no  Does the patient require bridging? no      Anticoagulation Summary  As of 2024      INR goal:  2.0-3.0   TTR:  52.1% (7.6 mo)   INR used for dosin.60 (2024)   Weekly warfarin total:  37.5 mg               Assessment/Plan   Subtherapeutic     1. New dose: increase weekly dose    2. Next INR: 1 week      Education provided to patient during the visit:  Patient instructed to call in interim with questions, concerns and changes.   Patient educated on dietary consistency in vitamin k consumption.   Patient educated on compliance with dosing, follow up appointments, and prescribed plan of care.

## 2024-05-28 ENCOUNTER — APPOINTMENT (OUTPATIENT)
Dept: CARDIOLOGY | Facility: HOSPITAL | Age: 74
End: 2024-05-28
Payer: MEDICARE

## 2024-05-31 ENCOUNTER — HOSPITAL ENCOUNTER (OUTPATIENT)
Dept: RADIOLOGY | Facility: CLINIC | Age: 74
Discharge: HOME | End: 2024-05-31
Payer: MEDICARE

## 2024-05-31 ENCOUNTER — ANTICOAGULATION - WARFARIN VISIT (OUTPATIENT)
Dept: CARDIOLOGY | Facility: CLINIC | Age: 74
End: 2024-05-31
Payer: MEDICARE

## 2024-05-31 DIAGNOSIS — I26.99 PULMONARY EMBOLISM, UNSPECIFIED CHRONICITY, UNSPECIFIED PULMONARY EMBOLISM TYPE, UNSPECIFIED WHETHER ACUTE COR PULMONALE PRESENT (MULTI): Primary | ICD-10-CM

## 2024-05-31 DIAGNOSIS — M43.9 COMPRESSION DEFORMITY OF VERTEBRA: ICD-10-CM

## 2024-05-31 DIAGNOSIS — M47.816 LUMBAR SPONDYLOSIS: ICD-10-CM

## 2024-05-31 DIAGNOSIS — M48.062 SPINAL STENOSIS OF LUMBAR REGION WITH NEUROGENIC CLAUDICATION: ICD-10-CM

## 2024-05-31 DIAGNOSIS — M81.0 AGE-RELATED OSTEOPOROSIS WITHOUT CURRENT PATHOLOGICAL FRACTURE: ICD-10-CM

## 2024-05-31 LAB
POC INR: 2.4
POC PROTHROMBIN TIME: NORMAL

## 2024-05-31 PROCEDURE — 85610 PROTHROMBIN TIME: CPT | Mod: QW

## 2024-05-31 PROCEDURE — 72131 CT LUMBAR SPINE W/O DYE: CPT

## 2024-05-31 PROCEDURE — 99211 OFF/OP EST MAY X REQ PHY/QHP: CPT

## 2024-05-31 PROCEDURE — 77080 DXA BONE DENSITY AXIAL: CPT

## 2024-05-31 NOTE — PROGRESS NOTES
Patient identification verified with 2 identifiers.    Location: [facility] at [facility] - [address] [address] [phone] option #1      Referring Physician: DR. PHAM  Enrollment/ Re-enrollment date: 25   INR Goal: 2.0-3.0  INR monitoring is per [facility] protocol.  Anticoagulation Medication: warfarin  Indication: Pulmonary Embolism (PE)    Subjective   Bleeding signs/symptoms: No    Bruising: No   Major bleeding event: No  Thrombosis signs/symptoms: No  Thromboembolic event: No  Missed doses: No  Extra doses: No  Medication changes: No  Dietary changes: No  Change in health: No  Change in activity: No  Alcohol: No  Other concerns: No    Upcoming Procedures:  Does the Patient Have any upcoming procedures that require interruption in anticoagulation therapy? no  Does the patient require bridging? no      Anticoagulation Summary  As of 2024      INR goal:  2.0-3.0   TTR:  52.0% (7.8 mo)   INR used for dosin.40 (2024)   Weekly warfarin total:  37.5 mg               Assessment/Plan   therapeutic    1. New dose: maintain weekly dose    2. Next INR: 1 week      Education provided to patient during the visit:  Patient instructed to call in interim with questions, concerns and changes.   Patient educated on dietary consistency in vitamin k consumption.   Patient educated on compliance with dosing, follow up appointments, and prescribed plan of care.

## 2024-06-04 ENCOUNTER — APPOINTMENT (OUTPATIENT)
Dept: HEMATOLOGY/ONCOLOGY | Facility: CLINIC | Age: 74
End: 2024-06-04
Payer: MEDICARE

## 2024-06-04 ENCOUNTER — OFFICE VISIT (OUTPATIENT)
Dept: CARDIOLOGY | Facility: HOSPITAL | Age: 74
End: 2024-06-04
Payer: MEDICARE

## 2024-06-04 VITALS
HEIGHT: 72 IN | HEART RATE: 76 BPM | RESPIRATION RATE: 16 BRPM | DIASTOLIC BLOOD PRESSURE: 89 MMHG | WEIGHT: 222 LBS | SYSTOLIC BLOOD PRESSURE: 134 MMHG | BODY MASS INDEX: 30.07 KG/M2

## 2024-06-04 DIAGNOSIS — Z79.01 CHRONIC ANTICOAGULATION: ICD-10-CM

## 2024-06-04 DIAGNOSIS — Z86.711 HISTORY OF PULMONARY EMBOLISM: ICD-10-CM

## 2024-06-04 PROCEDURE — 99214 OFFICE O/P EST MOD 30 MIN: CPT | Performed by: INTERNAL MEDICINE

## 2024-06-04 ASSESSMENT — PAIN SCALES - GENERAL: PAINLEVEL: 0-NO PAIN

## 2024-06-04 NOTE — PATIENT INSTRUCTIONS
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 Resume post-op per guidance from neurosurgery but ideally post-op days 2-3. Discussed increased risk for recurrent VTE while off AC and the need to get imaging and evaluation for any new sx. Follow up 6 months. Use the med-alert bracelet.

## 2024-06-04 NOTE — PROGRESS NOTES
OUTPATIENT FOLLOW-UP -  VASCULAR MEDICINE    DOS: 6/4/24  Last seen:     9/12/23    REQUESTING PHYSICIAN:  Dr. 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 Remains on warfarin. Las visit was going to start xarelto but this was reportedly too expensive. No bleeding on the warfarin. Pending back surgery June 26 for disc disease, also reports OA and fracture. This is planned for ?overnight but to be done minimally invasively. Unclear how long after th e procedure the AC will need to be interrupted. Reports prostate cancer is stable. Reports off all meds - just followed clinically now.     VTE hx - idiopathic PE 2013 - has been on AC since. No complications.     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 Resume post-op per guidance from neurosurgery but ideally post-op days 2-3. Discussed increased risk for recurrent VTE while off AC and the need to get imaging and evaluation for any new sx. Follow up 6 months. Use the med-alert bracelet.

## 2024-06-05 ENCOUNTER — CLINICAL SUPPORT (OUTPATIENT)
Dept: PREADMISSION TESTING | Facility: HOSPITAL | Age: 74
End: 2024-06-05
Payer: MEDICARE

## 2024-06-05 NOTE — CPM/PAT NURSE NOTE
CPM/PAT Nurse Note      Name: Louis Rainey (Louis Rainey)  /Age: 1950/73 y.o.       Past Medical History:   Diagnosis Date    Anemia     Arrhythmia     SVT/ PVCs-(Ziopatch Feb-2022), AFIB    Arthritis     Forrest's esophagus     CKD (chronic kidney disease)     Erectile dysfunction     HTN (hypertension)     Low back pain     Lumbar compression fracture (Multi)     Lumbar spondylosis     PE (pulmonary thromboembolism) (Multi)     on Coumadin    Prostate cancer (Multi)     s/p RT follows with Paul Navarrete, ADAM    Spinal stenosis     Syncope        Past Surgical History:   Procedure Laterality Date    COLONOSCOPY      ESOPHAGOGASTRODUODENOSCOPY      OTHER SURGICAL HISTORY  2019    Knee surgery    OTHER SURGICAL HISTORY  2019    Shoulder surgery    OTHER SURGICAL HISTORY  2019    Gallbladder surgery       Patient  has no history on file for sexual activity.    No family history on file.    Allergies   Allergen Reactions    Cefazolin Sodium Unknown    Cephalexin Unknown    Cephalosporins Unknown     states was mild and it was 20 years ago       Prior to Admission medications    Medication Sig Start Date End Date Taking? Authorizing Provider   amLODIPine (Norvasc) 5 mg tablet Take 1 tablet (5 mg) by mouth once daily. 4/10/24   Historical Provider, MD   calcium carbonate (Oscal) 500 mg calcium (1,250 mg) tablet Take 1 tablet (1,250 mg) by mouth once daily.    Historical Provider, MD   cholecalciferol (Vitamin D-3) 10 mcg (400 unit) capsule Take 1 capsule (10 mcg) by mouth once daily.    Historical Provider, MD   cyanocobalamin (Vitamin B-12) 1,000 mcg tablet Take 1 tablet (1,000 mcg) by mouth once daily.    Historical Provider, MD   gabapentin (Neurontin) 100 mg capsule Take 1 capsule (100 mg) by mouth once daily at bedtime for 7 days, THEN 1 capsule (100 mg) 2 times a day for 7 days, THEN 1 capsule (100 mg) 3 times a day. 3/26/24 5/9/24  Elieser Verma MD   multivitamin  tablet Take 1 tablet by mouth once daily.    Historical Provider, MD   orphenadrine (Norflex) 100 mg 12 hr tablet Take 1 tablet (100 mg) by mouth 2 times a day as needed for muscle spasms for up to 10 days. Do not crush, chew, or split. 11/23/23 12/3/23  Katie Aguilera MD   Stool Softener-Laxative 8.6-50 mg tablet TAKE TWO TABLETS BY MOUTH ONCE DAILY AT BEDTIME 2/26/24   Historical Provider, MD   tiZANidine (Zanaflex) 4 mg tablet every 8 hours. 12/12/23   Historical Provider, MD   warfarin (Coumadin) 7.5 mg tablet Take as directed per After Visit Summary. 10/27/23 10/26/24  Marla Vela MD   amLODIPine (Norvasc) 10 mg tablet Take 0.5 tablets (5 mg) by mouth once daily. 10/8/13 6/4/24  Historical Provider, MD        PAT ROS     DASI Risk Score    No data to display       Caprini DVT Assessment    No data to display       Modified Frailty Index    No data to display       CHADS2 Stroke Risk  Current as of 9 minutes ago        8.5% 3 to 100%: High Risk   2 to < 3%: Medium Risk   0 to < 2%: Low Risk     Last Change:           This score determines the patient's risk of having a stroke if the patient has atrial fibrillation.          Points Metrics   0 Has Congestive Heart Failure:  No     Patients with congestive heart failure get 1 point.    Current as of 9 minutes ago   1 Has Hypertension:  Yes     Patients with hypertension get 1 point.    Current as of 9 minutes ago   1 Age:  75     Patients who are 75 years of age or older get 1 point.    Current as of 9 minutes ago   0 Has Diabetes:  No     Patients with diabetes get 1 point.    Current as of 9 minutes ago   2 Had Stroke:  No  Had TIA:  No  Had Thromboembolism:  Yes     Patients who have had a stroke, TIA, or thromboembolism get 2 points.    Current as of 9 minutes ago             Revised Cardiac Risk Index    No data to display       Apfel Simplified Score    No data to display       Risk Analysis Index Results This Encounter    No data found in the last 1  encounters.       Scheduled for minimally invasive L2-3 and L3-4 laminectomy, partial medial facetectomies bialteral and kyphoplasty for L4 compression fracture - Bilateral on 24 with Dr. Atkinson.    Vascular Medicine: Marla Vela MD LOV 24 seen for follow up idiopathic PE on long term AC.  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 Resume post-op per guidance from neurosurgery but ideally post-op days 2-3. Discussed increased risk for recurrent VTE while off AC and the need to get imaging and evaluation for any new sx. Follow up 6 months. Use the med-alert bracelet.     RadON: VEDA Fuentes-CNP LOV 24 seen for prostate cancer  Neurosurgery: Gio Atkinson MD LOV 24 seen for for low back pain, lumbar spinal stenosis, lumbar compression fracture.  Pain Management: Elieser Verma MD LOV 24  Orthopaedic Surgery: Flo Plunkett MD LOV 24 seen for low back pain  Cardiology: Vladislav Hickey PA-C LOV 23 seen for cardiovascular evaluation while on clinical trial and cardiac risk stratification.   PVC/SVT  - Ziopatch: (Feb-Mar 2022) showed sinus bradycardia ([place] 8.93%) â€“ sinus tachycardia ([place] 2.27%). Min HR 50 bpm, Max  bpm, Avg HR 72 bpm. 1299 SVEs ([place] 0.09%) 4 runs SVT longest run lasting 5 beats and fastest run  bpm [id number] VEs ([place] 14.32%) 5079 runs SVT longest run lasting 7 beats and fastest run  bpm.   - Pt had been on metoprolol, no longer taking and pt is declining to resume at this time. Will readdress after treatment  -ECHO(TTE): 23  CONCLUSIONS:  1. Left ventricular systolic function is normal with a 55-60% estimated ejection fraction.  2. Spectral Doppler shows an impaired relaxation pattern of left ventricular diastolic filling.  3. The left atrium is mild to moderately dilated.  4. RVSP within normal limits.  -EC23 NSR normal  ECG    -ECHO(TTE): 02/11/2022  CONCLUSIONS:   1. The left ventricular systolic function is low normal with a 50-55% estimated ejection fraction.   2. Severely increased left ventricular septal thickness.   3. Spectral Doppler shows an impaired relaxation pattern of left ventricular diastolic filling.   4. RVSP within normal limits.   5. Aortic valve stenosis is not present.    Nurse Plan of Action:   Requested cardiac clearance from Vladislav Hickey PA-C via staff message.   ONLY    Huma Mccarthy RN  Pre-Admission Testing

## 2024-06-07 ENCOUNTER — ANTICOAGULATION - WARFARIN VISIT (OUTPATIENT)
Dept: CARDIOLOGY | Facility: CLINIC | Age: 74
End: 2024-06-07
Payer: MEDICARE

## 2024-06-07 DIAGNOSIS — I26.99 PULMONARY EMBOLISM, UNSPECIFIED CHRONICITY, UNSPECIFIED PULMONARY EMBOLISM TYPE, UNSPECIFIED WHETHER ACUTE COR PULMONALE PRESENT (MULTI): Primary | ICD-10-CM

## 2024-06-07 LAB
POC INR: 1.8
POC PROTHROMBIN TIME: NORMAL

## 2024-06-07 PROCEDURE — 99211 OFF/OP EST MAY X REQ PHY/QHP: CPT

## 2024-06-07 PROCEDURE — 85610 PROTHROMBIN TIME: CPT | Mod: QW

## 2024-06-07 NOTE — PROGRESS NOTES
Patient identification verified with 2 identifiers.    Location: [facility] at [facility] - [address] [address] [phone] option #1      Referring Physician: DR. PHAM  Enrollment/ Re-enrollment date: 25   INR Goal: 2.0-3.0  INR monitoring is per [facility] protocol.  Anticoagulation Medication: warfarin  Indication: Pulmonary Embolism (PE)    Subjective   Bleeding signs/symptoms: No    Bruising: No   Major bleeding event: No  Thrombosis signs/symptoms: No  Thromboembolic event: No  Missed doses: No  Extra doses: No  Medication changes: No  Dietary changes: No  Change in health: No  Change in activity: No  Alcohol: No  Other concerns: No    Upcoming Procedures:  Does the Patient Have any upcoming procedures that require interruption in anticoagulation therapy? no  Does the patient require bridging? no      Anticoagulation Summary  As of 2024      INR goal:  2.0-3.0   TTR:  52.5% (8.1 mo)   INR used for dosin.80 (2024)   Weekly warfarin total:  41.25 mg               Assessment/Plan   Sub therapeutic    1. New dose: increase weekly dose    2. Next INR: 1 week      Education provided to patient during the visit:  Patient instructed to call in interim with questions, concerns and changes.   Patient educated on dietary consistency in vitamin k consumption.   Patient educated on compliance with dosing, follow up appointments, and prescribed plan of care.

## 2024-06-12 ENCOUNTER — HOSPITAL ENCOUNTER (OUTPATIENT)
Dept: CARDIOLOGY | Facility: HOSPITAL | Age: 74
Discharge: HOME | End: 2024-06-12
Payer: MEDICARE

## 2024-06-12 ENCOUNTER — PRE-ADMISSION TESTING (OUTPATIENT)
Dept: PREADMISSION TESTING | Facility: HOSPITAL | Age: 74
End: 2024-06-12
Payer: MEDICARE

## 2024-06-12 VITALS
DIASTOLIC BLOOD PRESSURE: 71 MMHG | HEIGHT: 72 IN | HEART RATE: 96 BPM | SYSTOLIC BLOOD PRESSURE: 122 MMHG | WEIGHT: 222.3 LBS | TEMPERATURE: 98.1 F | BODY MASS INDEX: 30.11 KG/M2 | OXYGEN SATURATION: 96 %

## 2024-06-12 DIAGNOSIS — R79.89 OTHER SPECIFIED ABNORMAL FINDINGS OF BLOOD CHEMISTRY: ICD-10-CM

## 2024-06-12 DIAGNOSIS — S32.040A COMPRESSION FRACTURE OF L4 VERTEBRA, INITIAL ENCOUNTER (MULTI): ICD-10-CM

## 2024-06-12 DIAGNOSIS — M48.062 SPINAL STENOSIS OF LUMBAR REGION WITH NEUROGENIC CLAUDICATION: ICD-10-CM

## 2024-06-12 DIAGNOSIS — Z01.818 PREOPERATIVE EXAMINATION: Primary | ICD-10-CM

## 2024-06-12 DIAGNOSIS — I26.99 PULMONARY EMBOLISM, OTHER, UNSPECIFIED CHRONICITY, UNSPECIFIED WHETHER ACUTE COR PULMONALE PRESENT (MULTI): ICD-10-CM

## 2024-06-12 DIAGNOSIS — Z01.818 PREOPERATIVE EXAMINATION: ICD-10-CM

## 2024-06-12 DIAGNOSIS — R79.1 ABNORMAL COAGULATION PROFILE: ICD-10-CM

## 2024-06-12 DIAGNOSIS — I10 HYPERTENSION, UNSPECIFIED TYPE: ICD-10-CM

## 2024-06-12 LAB
ABO GROUP (TYPE) IN BLOOD: NORMAL
ANION GAP SERPL CALC-SCNC: 14 MMOL/L (ref 10–20)
ANTIBODY SCREEN: NORMAL
APPEARANCE UR: CLEAR
BILIRUB UR STRIP.AUTO-MCNC: NEGATIVE MG/DL
BUN SERPL-MCNC: 14 MG/DL (ref 6–23)
CALCIUM SERPL-MCNC: 8.9 MG/DL (ref 8.6–10.6)
CHLORIDE SERPL-SCNC: 106 MMOL/L (ref 98–107)
CO2 SERPL-SCNC: 25 MMOL/L (ref 21–32)
COLOR UR: NORMAL
CREAT SERPL-MCNC: 1.38 MG/DL (ref 0.5–1.3)
EGFRCR SERPLBLD CKD-EPI 2021: 53 ML/MIN/1.73M*2
ERYTHROCYTE [DISTWIDTH] IN BLOOD BY AUTOMATED COUNT: 15.7 % (ref 11.5–14.5)
EST. AVERAGE GLUCOSE BLD GHB EST-MCNC: 123 MG/DL
GLUCOSE SERPL-MCNC: 82 MG/DL (ref 74–99)
GLUCOSE UR STRIP.AUTO-MCNC: NORMAL MG/DL
HBA1C MFR BLD: 5.9 %
HCT VFR BLD AUTO: 38.9 % (ref 41–52)
HGB BLD-MCNC: 12.6 G/DL (ref 13.5–17.5)
KETONES UR STRIP.AUTO-MCNC: NEGATIVE MG/DL
LEUKOCYTE ESTERASE UR QL STRIP.AUTO: NEGATIVE
MCH RBC QN AUTO: 30.1 PG (ref 26–34)
MCHC RBC AUTO-ENTMCNC: 32.4 G/DL (ref 32–36)
MCV RBC AUTO: 93 FL (ref 80–100)
NITRITE UR QL STRIP.AUTO: NEGATIVE
NRBC BLD-RTO: 0 /100 WBCS (ref 0–0)
PH UR STRIP.AUTO: 5 [PH]
PLATELET # BLD AUTO: 219 X10*3/UL (ref 150–450)
POTASSIUM SERPL-SCNC: 4.5 MMOL/L (ref 3.5–5.3)
PROT UR STRIP.AUTO-MCNC: NEGATIVE MG/DL
RBC # BLD AUTO: 4.18 X10*6/UL (ref 4.5–5.9)
RBC # UR STRIP.AUTO: NEGATIVE /UL
RH FACTOR (ANTIGEN D): NORMAL
SODIUM SERPL-SCNC: 140 MMOL/L (ref 136–145)
SP GR UR STRIP.AUTO: 1.01
UROBILINOGEN UR STRIP.AUTO-MCNC: NORMAL MG/DL
WBC # BLD AUTO: 4 X10*3/UL (ref 4.4–11.3)

## 2024-06-12 PROCEDURE — 83036 HEMOGLOBIN GLYCOSYLATED A1C: CPT | Performed by: NURSE PRACTITIONER

## 2024-06-12 PROCEDURE — 85027 COMPLETE CBC AUTOMATED: CPT | Performed by: NURSE PRACTITIONER

## 2024-06-12 PROCEDURE — 99205 OFFICE O/P NEW HI 60 MIN: CPT | Performed by: NURSE PRACTITIONER

## 2024-06-12 PROCEDURE — 86901 BLOOD TYPING SEROLOGIC RH(D): CPT | Performed by: NURSE PRACTITIONER

## 2024-06-12 PROCEDURE — 99215 OFFICE O/P EST HI 40 MIN: CPT | Performed by: NURSE PRACTITIONER

## 2024-06-12 PROCEDURE — 36415 COLL VENOUS BLD VENIPUNCTURE: CPT

## 2024-06-12 PROCEDURE — 80048 BASIC METABOLIC PNL TOTAL CA: CPT | Performed by: NURSE PRACTITIONER

## 2024-06-12 PROCEDURE — 87081 CULTURE SCREEN ONLY: CPT | Performed by: NURSE PRACTITIONER

## 2024-06-12 PROCEDURE — 93005 ELECTROCARDIOGRAM TRACING: CPT

## 2024-06-12 PROCEDURE — 81003 URINALYSIS AUTO W/O SCOPE: CPT | Performed by: NURSE PRACTITIONER

## 2024-06-12 RX ORDER — CHLORHEXIDINE GLUCONATE 40 MG/ML
SOLUTION TOPICAL 2 TIMES DAILY
Qty: 473 ML | Refills: 0 | Status: SHIPPED | OUTPATIENT
Start: 2024-06-12 | End: 2024-06-17

## 2024-06-12 RX ORDER — CHLORHEXIDINE GLUCONATE ORAL RINSE 1.2 MG/ML
SOLUTION DENTAL
Qty: 473 ML | Refills: 0 | Status: ON HOLD | OUTPATIENT
Start: 2024-06-12 | End: 2024-06-26 | Stop reason: ALTCHOICE

## 2024-06-12 ASSESSMENT — DUKE ACTIVITY SCORE INDEX (DASI)
CAN YOU DO YARD WORK LIKE RAKING LEAVES, WEEDING OR PUSHING A MOWER: YES
CAN YOU TAKE CARE OF YOURSELF (EAT, DRESS, BATHE, OR USE TOILET): YES
CAN YOU DO LIGHT WORK AROUND THE HOUSE LIKE DUSTING OR WASHING DISHES: YES
CAN YOU DO HEAVY WORK AROUND THE HOUSE LIKE SCRUBBING FLOORS OR LIFTING AND MOVING HEAVY FURNITURE: NO
CAN YOU CLIMB A FLIGHT OF STAIRS OR WALK UP A HILL: YES
CAN YOU DO MODERATE WORK AROUND THE HOUSE LIKE VACUUMING, SWEEPING FLOORS OR CARRYING GROCERIES: YES
TOTAL_SCORE: 20.7
CAN YOU PARTICIPATE IN MODERATE RECREATIONAL ACTIVITIES LIKE GOLF, BOWLING, DANCING, DOUBLES TENNIS OR THROWING A BASEBALL OR FOOTBALL: NO
CAN YOU PARTICIPATE IN STRENOUS SPORTS LIKE SWIMMING, SINGLES TENNIS, FOOTBALL, BASKETBALL, OR SKIING: NO
CAN YOU RUN A SHORT DISTANCE: NO
CAN YOU WALK INDOORS, SUCH AS AROUND YOUR HOUSE: YES
CAN YOU HAVE SEXUAL RELATIONS: NO
CAN YOU WALK A BLOCK OR TWO ON LEVEL GROUND: NO
DASI METS SCORE: 5.3

## 2024-06-12 ASSESSMENT — ENCOUNTER SYMPTOMS
CARDIOVASCULAR NEGATIVE: 1
ENDOCRINE NEGATIVE: 1
NUMBNESS: 1
CONSTITUTIONAL NEGATIVE: 1
GASTROINTESTINAL NEGATIVE: 1
RESPIRATORY NEGATIVE: 1
NECK NEGATIVE: 1
EYES NEGATIVE: 1

## 2024-06-12 ASSESSMENT — LIFESTYLE VARIABLES: SMOKING_STATUS: NONSMOKER

## 2024-06-12 NOTE — CPM/PAT H&P
CPM/PAT Evaluation       Name: Louis Rainey (Louis Rainey)  /Age: 1950/73 y.o.     Visit Type:   In-Person       Chief Complaint: lumbar spinal stenosis    HPI: Patient is a 73-year-old male scheduled for minimally invasive L2-3 and L3-4 laminectomy, partial medial facetectomies bilateral and kyphoplasty for L4 compression fracture on 2024 for treatment of lumbar spinal stenosis and L4 compression fracture.  The patient is referred by Dr. 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    Past Medical History:   Diagnosis Date    Anemia     Arrhythmia     SVT/ PVCs-([facility] Feb-2022), AFIB    Arthritis     Forrest's esophagus     CKD (chronic kidney disease)     stage III    Erectile dysfunction     GERD (gastroesophageal reflux disease)     HTN (hypertension)     Low back pain     Lumbar compression fracture (Multi)     Lumbar spondylosis     PE (pulmonary thromboembolism) (Multi)     on Coumadin    Prostate cancer (Multi)     s/p RT follows with Paul Navarrete, CNP    Spinal stenosis     Syncope        Past Surgical History:   Procedure Laterality Date    COLONOSCOPY      ESOPHAGOGASTRODUODENOSCOPY      OTHER SURGICAL HISTORY  2019    Knee surgery    OTHER SURGICAL HISTORY  2019    Shoulder surgery    OTHER SURGICAL HISTORY  2019    Gallbladder surgery       Patient  has no history on file for sexual activity.    Family History   Problem Relation Name Age of Onset    Cancer Mother      Heart attack Father         Allergies   Allergen Reactions    Cefazolin Sodium Unknown    Cephalexin Unknown    Cephalosporins Unknown     states was mild and it was 20 years ago       Prior to Admission  medications    Medication Sig Start Date End Date Taking? Authorizing Provider   amLODIPine (Norvasc) 5 mg tablet Take 1 tablet (5 mg) by mouth once daily. 4/10/24  Yes Historical Provider, MD   calcium carbonate (Oscal) 500 mg calcium (1,250 mg) tablet Take 1 tablet (1,250 mg) by mouth once daily.    Historical Provider, MD   chlorhexidine (Hibiclens) 4 % external liquid Apply topically 2 times a day for 5 days. 6/12/24 6/17/24  Samantha A Meeson, APRN-CNP   chlorhexidine (Peridex) 0.12 % solution Swish and spit 15 mL night before surgery and morning of surgery 6/12/24   Samantha A Meeson, APRN-CNP   cholecalciferol (Vitamin D-3) 10 mcg (400 unit) capsule Take 1 capsule (10 mcg) by mouth once daily.    Historical Provider, MD   cyanocobalamin (Vitamin B-12) 1,000 mcg tablet Take 1 tablet (1,000 mcg) by mouth once daily.    Historical Provider, MD   gabapentin (Neurontin) 100 mg capsule Take 1 capsule (100 mg) by mouth once daily at bedtime for 7 days, THEN 1 capsule (100 mg) 2 times a day for 7 days, THEN 1 capsule (100 mg) 3 times a day. 3/26/24 5/9/24  Elieser Verma MD   multivitamin tablet Take 1 tablet by mouth once daily.    Historical Provider, MD   orphenadrine (Norflex) 100 mg 12 hr tablet Take 1 tablet (100 mg) by mouth 2 times a day as needed for muscle spasms for up to 10 days. Do not crush, chew, or split. 11/23/23 12/3/23  Katie Aguilera MD   warfarin (Coumadin) 7.5 mg tablet Take as directed per After Visit Summary. 10/27/23 10/26/24  Marla Vela MD   Stool Softener-Laxative 8.6-50 mg tablet TAKE TWO TABLETS BY MOUTH ONCE DAILY AT BEDTIME 2/26/24 6/12/24  Historical Provider, MD   tiZANidine (Zanaflex) 4 mg tablet every 8 hours. 12/12/23 6/12/24  Historical Provider, MD NGUYEN ROS:   Constitutional:   neg    Neuro/Psych:    numbness (bilateral feet)  Eyes:   neg    Ears:   neg    Nose:   neg    Mouth:   neg    Throat:   neg    Neck:   neg    Cardio:   neg    Respiratory:   neg     Endocrine:   neg    GI:   neg    :   neg    Musculoskeletal:    Chronic low back pain  Hematologic:   neg    Skin:  neg        Physical Exam  Vitals reviewed.   Constitutional:       Appearance: Normal appearance.      Comments: mustache   HENT:      Head: Normocephalic.      Nose: Nose normal.      Mouth/Throat:      Mouth: Mucous membranes are moist.   Eyes:      Conjunctiva/sclera: Conjunctivae normal.   Neck:      Vascular: No carotid bruit.   Cardiovascular:      Rate and Rhythm: Normal rate and regular rhythm.      Pulses: Normal pulses.      Heart sounds: Normal heart sounds.   Pulmonary:      Effort: Pulmonary effort is normal.      Breath sounds: Normal breath sounds.   Abdominal:      Palpations: Abdomen is soft.      Tenderness: There is no abdominal tenderness.   Musculoskeletal:         General: Normal range of motion.      Cervical back: Normal range of motion.      Right lower leg: No edema.      Left lower leg: No edema.   Lymphadenopathy:      Cervical: No cervical adenopathy.   Skin:     General: Skin is warm and dry.      Capillary Refill: Capillary refill takes less than 2 seconds.   Neurological:      General: No focal deficit present.      Mental Status: He is alert and oriented to person, place, and time.   Psychiatric:         Mood and Affect: Mood normal.         Behavior: Behavior normal. Behavior is cooperative.         Thought Content: Thought content normal.         Judgment: Judgment normal.        PAT AIRWAY:   Airway:     Mallampati::  III         Visit Vitals  /71   Pulse 96   Temp 36.7 °C (98.1 °F)       DASI Risk Score      Flowsheet Row Most Recent Value   DASI SCORE 20.7   METS Score (Will be calculated only when all the questions are answered) 5.3          Caprini DVT Assessment      Flowsheet Row Most Recent Value   DVT Score 13   Current Status Major surgery planned, lasting over 3 hours   History SVT, DVT/PE, Previous malignancy   Age 60-75 years   BMI 30 or less           Modified Frailty Index      Flowsheet Row Most Recent Value   Modified Frailty Index Calculator .0909          CHADS2 Stroke Risk  Current as of just now        8.5% 3 to 100%: High Risk   2 to < 3%: Medium Risk   0 to < 2%: Low Risk     Last Change:           This score determines the patient's risk of having a stroke if the patient has atrial fibrillation.          Points Metrics   0 Has Congestive Heart Failure:  No     Patients with congestive heart failure get 1 point.    Current as of just now   1 Has Hypertension:  Yes     Patients with hypertension get 1 point.    Current as of just now   1 Age:  75     Patients who are 75 years of age or older get 1 point.    Current as of just now   0 Has Diabetes:  No     Patients with diabetes get 1 point.    Current as of just now   2 Had Stroke:  No  Had TIA:  No  Had Thromboembolism:  Yes     Patients who have had a stroke, TIA, or thromboembolism get 2 points.    Current as of just now             Revised Cardiac Risk Index      Flowsheet Row Most Recent Value   Revised Cardiac Risk Calculator 0          Apfel Simplified Score      Flowsheet Row Most Recent Value   Apfel Simplified Score Calculator 2          Risk Analysis Index Results This Encounter    No data found in the last 1 encounters.       Stop Bang Score      Flowsheet Row Most Recent Value   Do you snore loudly? 0   Do you often feel tired or fatigued after your sleep? 1   Has anyone ever observed you stop breathing in your sleep? 0   Do you have or are you being treated for high blood pressure? 1   Recent BMI (Calculated) 30.1   Is BMI greater than 35 kg/m2? 0=No   Age older than 50 years old? 1=Yes   Is your neck circumference greater than 17 inches (Male) or 16 inches (Female)? 0   Gender - Male 1=Yes   STOP-BANG Total Score 4            Assessment and Plan:   Neuro:  No neurologic diagnoses, however, the patient is at an increased risk for post operative delirium secondary to age >/= 65 and  type and duration of surgery.  Preoperative brain exercise educational handout provided to patient.    The patient is at an increased risk for perioperative stroke secondary to increased age, HTN, general anesthesia, and op time >2.5 hours.     HEENT/Airway:  No diagnosis or significant findings on chart review or clinical presentation and evaluation.     Cardiovascular: Hypertension managed on amlodipine (continue). EKG in office today sinus sinus rhythm with occasional and consecutive PVCs and prolonged QT. When compared to prior QTc actually shorter.  Patient with a history of SVT/PVCs last seen by cardiology , CECILE Hickey on 04/12/23- patient was stable and okay to proceed with chemo, was to follow up with EKG but lost to follow up. Previously managed on metoprolol, which he is no longer taking and declined to resume per note.  Echo 03/08/2023 normal left ventricular systolic function with estimated ejection fraction of 55 to 60% with no regional wall motion abnormalities or significant valvular disease. No additional preoperative testing is currently indicated.    [facility]: (Feb-Mar 2022) showed sinus bradycardia (Kirksey 8.93%) â€“ sinus tachycardia (Kirksey 2.27%). Min HR 50 bpm, Max  bpm, Avg HR 72 bpm. 1299 SVEs (Kirksey 0.09%) 4 runs SVT longest run lasting 5 beats and fastest run  bpm [id number] VEs (Kirksey 14.32%) 5079 runs SVT longest run lasting 7 beats and fastest run  bpm.     Update 06/20/24: Patient seen by Dr. Dawson Ricardo on 09/23/22. Patient did not have any significant cardiac issues and was to follow up PRN.  Patient contacted Dr. Ricardo's office and he reviewed the patients EKG and chart and states he is a moderate risk for surgery and letter was placed in the letters tab.     METS are 5.3    RCRI  0 which is 3.9% 30 day risk of MACE (risk for cardiac death, nonfatal myocardial infarction, and nonfactal cardiac arrest    TYE score which indicates a 0.4% risk of  intraoperative or 30-day postoperative MACE      Pulmonary: No diagnosis or significant findings on chart review or clinical presentation and evaluation however see below risk screening tools.  Preoperative deep breathing educational handout provided to patient.    ARISCAT:    26  points which is a intermediate (13.3%) risk of in-hospital post-op pulmonary complications     PRODIGY: 20   points which is a high risk of post op opioid induced respiratory depression episodes    STOP BAN  points which is a intermediate risk for moderate to severe DILMA.  Patient declined referral to sleep medicine and prefers to speak to PCP postoperatively.    Renal: prostate cancer status post radiation therapy following with oncology.  ED not currently on medication. CKD stage III baseline creatinine 1.4-1.5    The patient is at increased risk of perioperative renal complications secondary to age>/= 56, male sex, and HTN. Preventative measures include preoperative BP control and hydration.    Endocrine:  No diagnosis or significant findings on chart review or clinical presentation and evaluation.     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 Patient following with Dr. Marla Vela, last visit 24- okay to hold warfarin 5 days and resume 2-3 days post op. Anemia that is mild and stable and does not require preoperative intervention.  Type and screen obtained and CPM today.  Preoperative DVT educational handout provided to patient.    Caprini Score:  13  points which is a highest risk of perioperative VTE    Gastrointestinal:  Forrest's esophagus diagnosed in  EGD and GERD managed with diet modifications.     EAT-10 score of 0 - self-perceived oropharyngeal dysphagia scale (0-40)     Apfel: 2 points 39% risk for post operative N/V    Infectious disease:  negative or type diagnoses     Musculoskeletal:  osteoarthritis managed on PRN pain relievers (hold  NSAIDs 7 days).  L4 compression fracture and lumbar spinal stenosis scheduled for surgery.        Labs ordered  Recent Results (from the past 168 hour(s))   CBC    Collection Time: 06/12/24  1:33 PM   Result Value Ref Range    WBC 4.0 (L) 4.4 - 11.3 x10*3/uL    nRBC 0.0 0.0 - 0.0 /100 WBCs    RBC 4.18 (L) 4.50 - 5.90 x10*6/uL    Hemoglobin 12.6 (L) 13.5 - 17.5 g/dL    Hematocrit 38.9 (L) 41.0 - 52.0 %    MCV 93 80 - 100 fL    MCH 30.1 26.0 - 34.0 pg    MCHC 32.4 32.0 - 36.0 g/dL    RDW 15.7 (H) 11.5 - 14.5 %    Platelets 219 150 - 450 x10*3/uL   Basic Metabolic Panel    Collection Time: 06/12/24  1:33 PM   Result Value Ref Range    Glucose 82 74 - 99 mg/dL    Sodium 140 136 - 145 mmol/L    Potassium 4.5 3.5 - 5.3 mmol/L    Chloride 106 98 - 107 mmol/L    Bicarbonate 25 21 - 32 mmol/L    Anion Gap 14 10 - 20 mmol/L    Urea Nitrogen 14 6 - 23 mg/dL    Creatinine 1.38 (H) 0.50 - 1.30 mg/dL    eGFR 53 (L) >60 mL/min/1.73m*2    Calcium 8.9 8.6 - 10.6 mg/dL   Type And Screen    Collection Time: 06/12/24  1:33 PM   Result Value Ref Range    ABO TYPE O     Rh TYPE POS     ANTIBODY SCREEN NEG    Hemoglobin A1C    Collection Time: 06/12/24  1:33 PM   Result Value Ref Range    Hemoglobin A1C 5.9 (H) see below %    Estimated Average Glucose 123 Not Established mg/dL   Staphylococcus aureus/MRSA colonization, Culture    Collection Time: 06/12/24  1:33 PM    Specimen: Nares/Axilla/Groin; Swab   Result Value Ref Range    Staph/MRSA Screen Culture (A)      Isolated: Methicillin Resistant Staphylococcus aureus (MRSA)   Urinalysis with Reflex Culture and Microscopic    Collection Time: 06/12/24  1:33 PM   Result Value Ref Range    Color, Urine Light-Yellow Light-Yellow, Yellow, Dark-Yellow    Appearance, Urine Clear Clear    Specific Gravity, Urine 1.009 1.005 - 1.035    pH, Urine 5.0 5.0, 5.5, 6.0, 6.5, 7.0, 7.5, 8.0    Protein, Urine NEGATIVE NEGATIVE, 10 (TRACE), 20 (TRACE) mg/dL    Glucose, Urine Normal Normal mg/dL    Blood,  Urine NEGATIVE NEGATIVE    Ketones, Urine NEGATIVE NEGATIVE mg/dL    Bilirubin, Urine NEGATIVE NEGATIVE    Urobilinogen, Urine Normal Normal mg/dL    Nitrite, Urine NEGATIVE NEGATIVE    Leukocyte Esterase, Urine NEGATIVE NEGATIVE   Extra Urine Gray Tube    Collection Time: 06/12/24  1:33 PM   Result Value Ref Range    Extra Tube Hold for add-ons.    ECG 12 Lead    Collection Time: 06/13/24 10:38 AM   Result Value Ref Range    Ventricular Rate 87 BPM    Atrial Rate 87 BPM    WV Interval 186 ms    QRS Duration 96 ms    QT Interval 406 ms    QTC Calculation(Bazett) 488 ms    P Axis 53 degrees    R Axis -16 degrees    T Axis 48 degrees    QRS Count 14 beats    Q Onset 222 ms    P Onset 129 ms    P Offset 198 ms    T Offset 425 ms    QTC Fredericia 459 ms   POCT INR manually resulted    Collection Time: 06/14/24  1:32 PM   Result Value Ref Range    POC INR 2.20     POC Prothrombin Time            INR 06/25/24 for warfarin therapy

## 2024-06-12 NOTE — PREPROCEDURE INSTRUCTIONS
Thank you for visiting [facility] ([facility]) today for your pre-procedure evaluation, you were seen by     Samantha Meeson, MSN, NP-C  Adult-Gerontology Nurse Practitioner II  Department of Anesthesiology and Perioperative Medicine  Main phone [phone]  Direct phone [phone]  Fax [phone]     This summary includes instructions and information to aid you during your perioperative period.  Please read carefully. If you have any questions about your visit today, please call the number listed above.  If you become ill or have any changes to your health before your surgery, please contact your primary care provider and alert your surgeon.    Preparing for your Surgery       Exercises  Preoperative Deep Breathing Exercises  Why it is important to do deep breathing exercises before my surgery?  Deep breathing exercises strengthen your breathing muscles.  This helps you to recover after your surgery and decreases the chance of breathing complications.  How are the deep breathing exercises done?  Sit straight with your back supported.  Breathe in deeply and slowly through your nose. Your lower rib cage should expand and your abdomen may move forward.  Hold that breath for 3 to 5 seconds.  Breathe out through pursed lips, slowly and completely.  Rest and repeat 10 times every hour while awake.  Rest longer if you become dizzy or lightheaded.       Incentive Spirometer   You were provided with an incentive spirometer in [facility], please follow the below instructions.   You were not provided an incentive spirometer in [facility], please disregard the incentive spirometer instructions  What is an incentive spirometer?  An incentive spirometer is a device used before and after surgery to “exercise” your lungs.  It helps you to take deeper breaths to expand your lungs.  Below is an example of a basic incentive spirometer.  The device you receive may differ slightly but they all function the  same.    Why do I need to use an incentive spirometer?  Using your incentive spirometer prepares your lungs for surgery and helps prevent lung problems after surgery.  How do I use my incentive spirometer?  When you're using your incentive spirometer, make sure to breathe through your mouth. If you breathe through your nose, the incentive spirometer won't work properly. You can hold your nose if you have trouble.  If you feel dizzy at any time, stop and rest. Try again at a later time.  Follow the steps below:  Set up your incentive spirometer, expand the flexible tubing and connect to the outlet.  Sit upright in a chair or bed. Hold the incentive spirometer at eye level.   Put the mouthpiece in your mouth and close your lips tightly around it. Slowly breathe out (exhale) completely.  Breathe in (inhale) slowly through your mouth as deeply as you can. As you take a breath, you will see the piston rise inside the large column. While the piston rises, the indicator should move upwards. It should stay in between the 2 arrows (see Figure).  Try to get the piston as high as you can, while keeping the indicator between the arrows.   If the indicator doesn't stay between the arrows, you're breathing either too fast or too slow.  When you get it as high as you can, hold your breath for 10 seconds, or as long as possible. While you're holding your breath, the piston will slowly fall to the base of the spirometer.  Once the piston reaches the bottom of the spirometer, breathe out slowly through your mouth. Rest for a few seconds.  Repeat 10 times. Try to get the piston to the same level with each breath.  Repeat every hour while awake  You can carefully clean the outside of the mouthpiece with an alcohol wipe or soap and water.      Preoperative Brain Exercises    What are brain exercises?  A brain exercise is any activity that engages your thinking (cognitive) skills.    What types of activities are considered brain  exercises?  Jigsaw puzzles, crossword puzzles, word jumble, memory games, word search, and many more.  Many can be found free online or on your phone via a mobile sukhwinder.    Why should I do brain exercises before my surgery?  More recent research has shown brain exercise before surgery can lower the risk of postoperative delirium (confusion) which can be especially important for older adults.  Patients who did brain exercises for 5 to 10 hours the days before surgery, cut their risk of postoperative delirium in half up to 1 week after surgery.    Sit-to-Stand Exercise    What is the sit-to-stand exercise?  The sit-to-stand exercise strengthens the muscles of your lower body and muscles in the center of your body (core muscles for stability) helping to maintain and improve your strength and mobility.  How do I do the sit-to-stand exercise?  The goal is to do this exercise without using your arms or hands.  If this is too difficult, use your arms and hands or a chair with armrests to help slowly push yourself to the standing position and lower yourself back to the sitting position. As the movement becomes easier use your arms and hands less.    Steps to the sit-to-stand exercise  Sit up tall in a sturdy chair, knees bent, feet flat on the floor shoulder-width apart.  Shift your hips/pelvis forward in the chair to correctly position yourself for the next movement.  Lean forward at your hips.  Stand up straight putting equal weight on both feet.  Check to be sure you are properly aligned with the chair, in a slow controlled movement sit back down.  Repeat this exercise 10-15 times.  If needed you can do it fewer times until your strength improves.  Rest for 1 minute.  Do another 10-15 sit-to-stand exercises.  Try to do this in the morning and evening.        Instructions    Preoperative Fasting Guidelines    Why must I stop eating and drinking near surgery time?  With sedation, food or liquid in your stomach can enter your  lungs causing serious complications  Food can increase nausea and vomiting  When do I need to stop eating and drinking before my surgery?      Do not eat any food after midnight the night before your surgery/procedure. You may have up to 13.5 ounces of clear liquid until TWO hours before your instructed arrival time to the hospital.  This includes water, black tea/coffee, (no milk or cream) apple juice, and electrolyte drinks (Gatorade). You may chew gum until TWO hours before your surgery/procedure            Simple things you can do to help prevent blood clots     Blood clots are blockages that can form in the body's veins. When a blood clot forms in your deep veins, it may be called a deep vein thrombosis, or DVT for short. Blood clots can happen in any part of the body where blood flows, but they are most common in the arms and legs. If a piece of a blood clot breaks free and travels to the lungs, it is called a pulmonary embolus (PE). A PE can be a very serious problem.         Being in the hospital or having surgery can raise your chances of getting a blood clot because you may not be well enough to move around as much as you normally do.         Ways you can help prevent blood clots in the hospital       Wearing SCDs  SCDs stands for Sequential Compression Devices.   SCDs are special sleeves that wrap around your legs. They attach to a pump that fills them with air to gently squeeze your legs every few minutes.  This helps return the blood in your legs to your heart.   SCDs should only be taken off when walking or bathing. SCDs may not be comfortable, but they can help save your life.              Pump SCD leg sleeves  Wearing compression stockings - if your doctor orders them. These special snug-fitting stockings gently squeeze your legs to help blood flow.       Walking. Walking helps move the blood in your legs.   If your doctor says it is ok, try walking the halls at least   5 times a day. Ask us to help  you get up, so you don't fall.      Taking any blood-thinning medicines your doctor orders.              Ways you can help prevent blood clots at home         Wearing compression stockings - if your doctor orders them.   Walking - to help move the blood in your legs.    Taking any blood-thinning medicines your doctor orders.      Signs of a blood clot or PE    Tell your doctor or nurse right away if you have any of the problems listed below.         If you are at home, seek medical care right away. Call 911 for chest pain or problems breathing.            Signs of a blood clot (DVT) - such as pain, swelling, redness, or warmth in your arm or legs.  Signs of a pulmonary embolism (PE) - such as chest pain or feeling short of breath      Tobacco and Alcohol;  Do not drink alcohol or smoke within 24 hours of surgery.  It is best to quit smoking for as long as possible before any surgery or procedure.        The Week before Surgery        Seven days before Surgery  Check your [facility] medication instructions  Do the exercises provided to you by [facility]   Arrange for a responsible, adult licensed  to take you home after surgery and stay with you for 24 hours.  You will not be permitted to drive yourself home if you have received any anesthetic/sedation  Six days before surgery  Check your [facility] medication instructions  Do the exercises provided to you by [facility]   Start using Chlorhexidene (CHG) body wash if prescribed  Five days before surgery  Check your [facility] medication instructions  Do the exercises provided to you by [facility]   Continue to use CHG body wash if prescribed  Three days before surgery  Check your [facility] medication instructions  Do the exercises provided to you by [facility]   Continue to use CHG body wash if prescribed  Two days before surgery  Check your [facility] medication instructions  Do the exercises provided to you by [facility]   Continue to use CHG body wash if prescribed    The Day before Surgery       Check your [facility] medication and  all other [facility] instructions including when to stop eating and drinking  You will be called with your arrival time for surgery in the late afternoon.  If you do not receive a call please reach out to your surgeon's office.  Do not smoke or drink 24 hours before surgery  Prepare items to bring with you to the hospital  Shower with your chlorhexidine wash if prescribed  Brush your teeth and use your chlorhexidine dental rinse if prescribed    The Day of Surgery       Check your [facility] medication instructions  Ensure you follow the instructions for when to stop eating and drinking  Shower, if prescribed use CHG.  Do not apply any lotions, creams, moisturizers, perfume or deodorant  Brush your teeth and use your CHG dental rinse if prescribed  Wear loose comfortable clothing  Avoid make-up  Remove  jewelry and piercings, consider professional piercing removal with a plastic spacer if needed  Bring photo ID and Insurance card  Bring an accurate medication list that includes medication dose, frequency and allergies  Bring a copy of your advanced directives (will, health care power of )  Bring any devices and controllers as well as medical devices you have been provided with for surgery (CPAP, slings, braces, etc.)  Dentures, eyeglasses, and contacts will be removed before surgery, please bring cases for contacts or glasses

## 2024-06-12 NOTE — H&P (VIEW-ONLY)
CPM/PAT Evaluation       Name: Louis Rainey (Louis Rainey)  /Age: 1950/73 y.o.     Visit Type:   In-Person       Chief Complaint: lumbar spinal stenosis    HPI: Patient is a 73-year-old male scheduled for minimally invasive L2-3 and L3-4 laminectomy, partial medial facetectomies bilateral and kyphoplasty for L4 compression fracture on 2024 for treatment of lumbar spinal stenosis and L4 compression fracture.  The patient is referred by Dr. 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    Past Medical History:   Diagnosis Date    Anemia     Arrhythmia     SVT/ PVCs-([facility]am[facility] Feb-2022), AFIB    Arthritis     Forrest's esophagus     CKD (chronic kidney disease)     stage III    Erectile dysfunction     GERD (gastroesophageal reflux disease)     HTN (hypertension)     Low back pain     Lumbar compression fracture (Multi)     Lumbar spondylosis     PE (pulmonary thromboembolism) (Multi)     on Coumadin    Prostate cancer (Multi)     s/p RT follows with Paul Navarrete, CNP    Spinal stenosis     Syncope        Past Surgical History:   Procedure Laterality Date    COLONOSCOPY      ESOPHAGOGASTRODUODENOSCOPY      OTHER SURGICAL HISTORY  2019    Knee surgery    OTHER SURGICAL HISTORY  2019    Shoulder surgery    OTHER SURGICAL HISTORY  2019    Gallbladder surgery       Patient  has no history on file for sexual activity.    Family History   Problem Relation Name Age of Onset    Cancer Mother      Heart attack Father         Allergies   Allergen Reactions    Cefazolin Sodium Unknown    Cephalexin Unknown    Cephalosporins Unknown     states was mild and it was 20 years ago       Prior to Admission  medications    Medication Sig Start Date End Date Taking? Authorizing Provider   amLODIPine (Norvasc) 5 mg tablet Take 1 tablet (5 mg) by mouth once daily. 4/10/24  Yes Historical Provider, MD   calcium carbonate (Oscal) 500 mg calcium (1,250 mg) tablet Take 1 tablet (1,250 mg) by mouth once daily.    Historical Provider, MD   chlorhexidine (Hibiclens) 4 % external liquid Apply topically 2 times a day for 5 days. 6/12/24 6/17/24  Samantha A Meeson, APRN-CNP   chlorhexidine (Peridex) 0.12 % solution Swish and spit 15 mL night before surgery and morning of surgery 6/12/24   Samantha A Meeson, APRN-CNP   cholecalciferol (Vitamin D-3) 10 mcg (400 unit) capsule Take 1 capsule (10 mcg) by mouth once daily.    Historical Provider, MD   cyanocobalamin (Vitamin B-12) 1,000 mcg tablet Take 1 tablet (1,000 mcg) by mouth once daily.    Historical Provider, MD   gabapentin (Neurontin) 100 mg capsule Take 1 capsule (100 mg) by mouth once daily at bedtime for 7 days, THEN 1 capsule (100 mg) 2 times a day for 7 days, THEN 1 capsule (100 mg) 3 times a day. 3/26/24 5/9/24  Elieser Verma MD   multivitamin tablet Take 1 tablet by mouth once daily.    Historical Provider, MD   orphenadrine (Norflex) 100 mg 12 hr tablet Take 1 tablet (100 mg) by mouth 2 times a day as needed for muscle spasms for up to 10 days. Do not crush, chew, or split. 11/23/23 12/3/23  Katie Aguilera MD   warfarin (Coumadin) 7.5 mg tablet Take as directed per After Visit Summary. 10/27/23 10/26/24  Marla Vela MD   Stool Softener-Laxative 8.6-50 mg tablet TAKE TWO TABLETS BY MOUTH ONCE DAILY AT BEDTIME 2/26/24 6/12/24  Historical Provider, MD   tiZANidine (Zanaflex) 4 mg tablet every 8 hours. 12/12/23 6/12/24  Historical Provider, MD NGUYEN ROS:   Constitutional:   neg    Neuro/Psych:    numbness (bilateral feet)  Eyes:   neg    Ears:   neg    Nose:   neg    Mouth:   neg    Throat:   neg    Neck:   neg    Cardio:   neg    Respiratory:   neg     Endocrine:   neg    GI:   neg    :   neg    Musculoskeletal:    Chronic low back pain  Hematologic:   neg    Skin:  neg        Physical Exam  Vitals reviewed.   Constitutional:       Appearance: Normal appearance.      Comments: mustache   HENT:      Head: Normocephalic.      Nose: Nose normal.      Mouth/Throat:      Mouth: Mucous membranes are moist.   Eyes:      Conjunctiva/sclera: Conjunctivae normal.   Neck:      Vascular: No carotid bruit.   Cardiovascular:      Rate and Rhythm: Normal rate and regular rhythm.      Pulses: Normal pulses.      Heart sounds: Normal heart sounds.   Pulmonary:      Effort: Pulmonary effort is normal.      Breath sounds: Normal breath sounds.   Abdominal:      Palpations: Abdomen is soft.      Tenderness: There is no abdominal tenderness.   Musculoskeletal:         General: Normal range of motion.      Cervical back: Normal range of motion.      Right lower leg: No edema.      Left lower leg: No edema.   Lymphadenopathy:      Cervical: No cervical adenopathy.   Skin:     General: Skin is warm and dry.      Capillary Refill: Capillary refill takes less than 2 seconds.   Neurological:      General: No focal deficit present.      Mental Status: He is alert and oriented to person, place, and time.   Psychiatric:         Mood and Affect: Mood normal.         Behavior: Behavior normal. Behavior is cooperative.         Thought Content: Thought content normal.         Judgment: Judgment normal.        PAT AIRWAY:   Airway:     Mallampati::  III         Visit Vitals  /71   Pulse 96   Temp 36.7 °C (98.1 °F)       DASI Risk Score      Flowsheet Row Most Recent Value   DASI SCORE 20.7   METS Score (Will be calculated only when all the questions are answered) 5.3          Caprini DVT Assessment      Flowsheet Row Most Recent Value   DVT Score 13   Current Status Major surgery planned, lasting over 3 hours   History SVT, DVT/PE, Previous malignancy   Age 60-75 years   BMI 30 or less           Modified Frailty Index      Flowsheet Row Most Recent Value   Modified Frailty Index Calculator .0909          CHADS2 Stroke Risk  Current as of just now        8.5% 3 to 100%: High Risk   2 to < 3%: Medium Risk   0 to < 2%: Low Risk     Last Change:           This score determines the patient's risk of having a stroke if the patient has atrial fibrillation.          Points Metrics   0 Has Congestive Heart Failure:  No     Patients with congestive heart failure get 1 point.    Current as of just now   1 Has Hypertension:  Yes     Patients with hypertension get 1 point.    Current as of just now   1 Age:  75     Patients who are 75 years of age or older get 1 point.    Current as of just now   0 Has Diabetes:  No     Patients with diabetes get 1 point.    Current as of just now   2 Had Stroke:  No  Had TIA:  No  Had Thromboembolism:  Yes     Patients who have had a stroke, TIA, or thromboembolism get 2 points.    Current as of just now             Revised Cardiac Risk Index      Flowsheet Row Most Recent Value   Revised Cardiac Risk Calculator 0          Apfel Simplified Score      Flowsheet Row Most Recent Value   Apfel Simplified Score Calculator 2          Risk Analysis Index Results This Encounter    No data found in the last 1 encounters.       Stop Bang Score      Flowsheet Row Most Recent Value   Do you snore loudly? 0   Do you often feel tired or fatigued after your sleep? 1   Has anyone ever observed you stop breathing in your sleep? 0   Do you have or are you being treated for high blood pressure? 1   Recent BMI (Calculated) 30.1   Is BMI greater than 35 kg/m2? 0=No   Age older than 50 years old? 1=Yes   Is your neck circumference greater than 17 inches (Male) or 16 inches (Female)? 0   Gender - Male 1=Yes   STOP-BANG Total Score 4            Assessment and Plan:   Neuro:  No neurologic diagnoses, however, the patient is at an increased risk for post operative delirium secondary to age >/= 65 and  type and duration of surgery.  Preoperative brain exercise educational handout provided to patient.    The patient is at an increased risk for perioperative stroke secondary to increased age, HTN, general anesthesia, and op time >2.5 hours.     HEENT/Airway:  No diagnosis or significant findings on chart review or clinical presentation and evaluation.     Cardiovascular: Hypertension managed on amlodipine (continue). EKG in office today sinus sinus rhythm with occasional and consecutive PVCs and prolonged QT. When compared to prior QTc actually shorter.  Patient with a history of SVT/PVCs last seen by cardiology , CECILE Hickey on 04/12/23- patient was stable and okay to proceed with chemo, was to follow up with EKG but lost to follow up. Previously managed on metoprolol, which he is no longer taking and declined to resume per note.  Echo 03/08/2023 normal left ventricular systolic function with estimated ejection fraction of 55 to 60% with no regional wall motion abnormalities or significant valvular disease. No additional preoperative testing is currently indicated.    Ziopatch: (Feb-Mar 2022) showed sinus bradycardia (Proctor 8.93%) â€“ sinus tachycardia (Proctor 2.27%). Min HR 50 bpm, Max  bpm, Avg HR 72 bpm. 1299 SVEs (Proctor 0.09%) 4 runs SVT longest run lasting 5 beats and fastest run  bpm [id number] VEs (Proctor 14.32%) 5079 runs SVT longest run lasting 7 beats and fastest run  bpm.     Update 06/20/24: Patient seen by Dr. Dawson Ricardo on 09/23/22. Patient did not have any significant cardiac issues and was to follow up PRN.  Patient contacted Dr. Ricardo's office and he reviewed the patients EKG and chart and states he is a moderate risk for surgery and letter was placed in the letters tab.     METS are 5.3    RCRI  0 which is 3.9% 30 day risk of MACE (risk for cardiac death, nonfatal myocardial infarction, and nonfactal cardiac arrest    TYE score which indicates a 0.4% risk of  intraoperative or 30-day postoperative MACE      Pulmonary: No diagnosis or significant findings on chart review or clinical presentation and evaluation however see below risk screening tools.  Preoperative deep breathing educational handout provided to patient.    ARISCAT:    26  points which is a intermediate (13.3%) risk of in-hospital post-op pulmonary complications     PRODIGY: 20   points which is a high risk of post op opioid induced respiratory depression episodes    STOP BAN  points which is a intermediate risk for moderate to severe DILMA.  Patient declined referral to sleep medicine and prefers to speak to PCP postoperatively.    Renal: prostate cancer status post radiation therapy following with oncology.  ED not currently on medication. CKD stage III baseline creatinine 1.4-1.5    The patient is at increased risk of perioperative renal complications secondary to age>/= 56, male sex, and HTN. Preventative measures include preoperative BP control and hydration.    Endocrine:  No diagnosis or significant findings on chart review or clinical presentation and evaluation.     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 Patient following with Dr. Marla Vela, last visit 24- okay to hold warfarin 5 days and resume 2-3 days post op. Anemia that is mild and stable and does not require preoperative intervention.  Type and screen obtained and CPM today.  Preoperative DVT educational handout provided to patient.    Caprini Score:  13  points which is a highest risk of perioperative VTE    Gastrointestinal:  Forrest's esophagus diagnosed in  EGD and GERD managed with diet modifications.     EAT-10 score of 0 - self-perceived oropharyngeal dysphagia scale (0-40)     Apfel: 2 points 39% risk for post operative N/V    Infectious disease:  negative or type diagnoses     Musculoskeletal:  osteoarthritis managed on PRN pain relievers (hold  NSAIDs 7 days).  L4 compression fracture and lumbar spinal stenosis scheduled for surgery.        Labs ordered  Recent Results (from the past 168 hour(s))   CBC    Collection Time: 06/12/24  1:33 PM   Result Value Ref Range    WBC 4.0 (L) 4.4 - 11.3 x10*3/uL    nRBC 0.0 0.0 - 0.0 /100 WBCs    RBC 4.18 (L) 4.50 - 5.90 x10*6/uL    Hemoglobin 12.6 (L) 13.5 - 17.5 g/dL    Hematocrit 38.9 (L) 41.0 - 52.0 %    MCV 93 80 - 100 fL    MCH 30.1 26.0 - 34.0 pg    MCHC 32.4 32.0 - 36.0 g/dL    RDW 15.7 (H) 11.5 - 14.5 %    Platelets 219 150 - 450 x10*3/uL   Basic Metabolic Panel    Collection Time: 06/12/24  1:33 PM   Result Value Ref Range    Glucose 82 74 - 99 mg/dL    Sodium 140 136 - 145 mmol/L    Potassium 4.5 3.5 - 5.3 mmol/L    Chloride 106 98 - 107 mmol/L    Bicarbonate 25 21 - 32 mmol/L    Anion Gap 14 10 - 20 mmol/L    Urea Nitrogen 14 6 - 23 mg/dL    Creatinine 1.38 (H) 0.50 - 1.30 mg/dL    eGFR 53 (L) >60 mL/min/1.73m*2    Calcium 8.9 8.6 - 10.6 mg/dL   Type And Screen    Collection Time: 06/12/24  1:33 PM   Result Value Ref Range    ABO TYPE O     Rh TYPE POS     ANTIBODY SCREEN NEG    Hemoglobin A1C    Collection Time: 06/12/24  1:33 PM   Result Value Ref Range    Hemoglobin A1C 5.9 (H) see below %    Estimated Average Glucose 123 Not Established mg/dL   Staphylococcus aureus/MRSA colonization, Culture    Collection Time: 06/12/24  1:33 PM    Specimen: Nares/Axilla/Groin; Swab   Result Value Ref Range    Staph/MRSA Screen Culture (A)      Isolated: Methicillin Resistant Staphylococcus aureus (MRSA)   Urinalysis with Reflex Culture and Microscopic    Collection Time: 06/12/24  1:33 PM   Result Value Ref Range    Color, Urine Light-Yellow Light-Yellow, Yellow, Dark-Yellow    Appearance, Urine Clear Clear    Specific Gravity, Urine 1.009 1.005 - 1.035    pH, Urine 5.0 5.0, 5.5, 6.0, 6.5, 7.0, 7.5, 8.0    Protein, Urine NEGATIVE NEGATIVE, 10 (TRACE), 20 (TRACE) mg/dL    Glucose, Urine Normal Normal mg/dL    Blood,  Urine NEGATIVE NEGATIVE    Ketones, Urine NEGATIVE NEGATIVE mg/dL    Bilirubin, Urine NEGATIVE NEGATIVE    Urobilinogen, Urine Normal Normal mg/dL    Nitrite, Urine NEGATIVE NEGATIVE    Leukocyte Esterase, Urine NEGATIVE NEGATIVE   Extra Urine Gray Tube    Collection Time: 06/12/24  1:33 PM   Result Value Ref Range    Extra Tube Hold for add-ons.    ECG 12 Lead    Collection Time: 06/13/24 10:38 AM   Result Value Ref Range    Ventricular Rate 87 BPM    Atrial Rate 87 BPM    MT Interval 186 ms    QRS Duration 96 ms    QT Interval 406 ms    QTC Calculation(Bazett) 488 ms    P Axis 53 degrees    R Axis -16 degrees    T Axis 48 degrees    QRS Count 14 beats    Q Onset 222 ms    P Onset 129 ms    P Offset 198 ms    T Offset 425 ms    QTC Fredericia 459 ms   POCT INR manually resulted    Collection Time: 06/14/24  1:32 PM   Result Value Ref Range    POC INR 2.20     POC Prothrombin Time            INR 06/25/24 for warfarin therapy

## 2024-06-13 LAB
ATRIAL RATE: 87 BPM
HOLD SPECIMEN: NORMAL
P AXIS: 53 DEGREES
P OFFSET: 198 MS
P ONSET: 129 MS
PR INTERVAL: 186 MS
Q ONSET: 222 MS
QRS COUNT: 14 BEATS
QRS DURATION: 96 MS
QT INTERVAL: 406 MS
QTC CALCULATION(BAZETT): 488 MS
QTC FREDERICIA: 459 MS
R AXIS: -16 DEGREES
T AXIS: 48 DEGREES
T OFFSET: 425 MS
VENTRICULAR RATE: 87 BPM

## 2024-06-13 PROCEDURE — 93005 ELECTROCARDIOGRAM TRACING: CPT

## 2024-06-14 ENCOUNTER — ANTICOAGULATION - WARFARIN VISIT (OUTPATIENT)
Dept: CARDIOLOGY | Facility: CLINIC | Age: 74
End: 2024-06-14
Payer: MEDICARE

## 2024-06-14 DIAGNOSIS — I26.99 PULMONARY EMBOLISM, UNSPECIFIED CHRONICITY, UNSPECIFIED PULMONARY EMBOLISM TYPE, UNSPECIFIED WHETHER ACUTE COR PULMONALE PRESENT (MULTI): Primary | ICD-10-CM

## 2024-06-14 LAB
POC INR: 2.2
POC PROTHROMBIN TIME: NORMAL
STAPHYLOCOCCUS SPEC CULT: ABNORMAL

## 2024-06-14 PROCEDURE — 99211 OFF/OP EST MAY X REQ PHY/QHP: CPT

## 2024-06-14 PROCEDURE — 85610 PROTHROMBIN TIME: CPT | Mod: QW

## 2024-06-14 NOTE — PROGRESS NOTES
Patient identification verified with 2 identifiers.    Location: [facility] - [address] [address] [phone] option #1      Referring Physician: DR. PHAM  Enrollment/ Re-enrollment date: 25   INR Goal: 2.0-3.0  INR monitoring is per [facility] protocol.  Anticoagulation Medication: warfarin  Indication: Pulmonary Embolism (PE)    Subjective   Bleeding signs/symptoms: No    Bruising: No   Major bleeding event: No  Thrombosis signs/symptoms: No  Thromboembolic event: No  Missed doses: No  Extra doses: No  Medication changes: No  Dietary changes: No  Change in health: No  Change in activity: No  Alcohol: No  Other concerns: No    Upcoming Procedures:  Does the Patient Have any upcoming procedures that require interruption in anticoagulation therapy? no  Does the patient require bridging? no      Anticoagulation Summary  As of 2024      INR goal:  2.0-3.0   TTR:  52.4% (8.3 mo)   INR used for dosin.20 (2024)   Weekly warfarin total:  41.25 mg               Assessment/Plan   therapeutic    1. New dose: maintain weekly dose    2. Next INR: 11 days      Education provided to patient during the visit:  Patient instructed to call in interim with questions, concerns and changes.   Patient educated on dietary consistency in vitamin k consumption.   Patient educated on compliance with dosing, follow up appointments, and prescribed plan of care.

## 2024-06-18 LAB
ATRIAL RATE: 87 BPM
P AXIS: 53 DEGREES
P OFFSET: 198 MS
P ONSET: 129 MS
PR INTERVAL: 186 MS
Q ONSET: 222 MS
QRS COUNT: 14 BEATS
QRS DURATION: 96 MS
QT INTERVAL: 406 MS
QTC CALCULATION(BAZETT): 488 MS
QTC FREDERICIA: 459 MS
R AXIS: -16 DEGREES
T AXIS: 48 DEGREES
T OFFSET: 425 MS
VENTRICULAR RATE: 87 BPM

## 2024-06-19 ENCOUNTER — TELEPHONE (OUTPATIENT)
Dept: NEUROSURGERY | Facility: HOSPITAL | Age: 74
End: 2024-06-19
Payer: MEDICARE

## 2024-06-19 NOTE — TELEPHONE ENCOUNTER
Spoke with Mr. Rainey today in regards to upcoming surgery with Dr. Atkinson. He is scheduled on 6/26/24 for a minimally invasive L2-3 and L3-4 laminectomy, partial medial facetectomies bialteral and kyphoplasty for L4 compression fracture. Upon Pre admit testing it was noted that he has a history of PVC's and was on Metoprolol that he notes he stopped taking. Cardiology clearance was requested. I updated Mr. Rainey that he would need to follow up with his cardiologist about safety and clearance for surgery by cardiology. He agreed and noted he is going to follow up with Dr. Dawson Ricardo who he has seen in the past. Mr. Rainey agreed and will call back with update.

## 2024-06-25 ENCOUNTER — ANESTHESIA EVENT (OUTPATIENT)
Dept: OPERATING ROOM | Facility: HOSPITAL | Age: 74
End: 2024-06-25
Payer: MEDICARE

## 2024-06-25 ENCOUNTER — APPOINTMENT (OUTPATIENT)
Dept: CARDIOLOGY | Facility: CLINIC | Age: 74
End: 2024-06-25
Payer: MEDICARE

## 2024-06-25 DIAGNOSIS — I26.99 PULMONARY EMBOLISM, UNSPECIFIED CHRONICITY, UNSPECIFIED PULMONARY EMBOLISM TYPE, UNSPECIFIED WHETHER ACUTE COR PULMONALE PRESENT (MULTI): Primary | ICD-10-CM

## 2024-06-25 LAB
POC INR: 1.2
POC PROTHROMBIN TIME: NORMAL

## 2024-06-25 PROCEDURE — 99211 OFF/OP EST MAY X REQ PHY/QHP: CPT

## 2024-06-25 PROCEDURE — 85610 PROTHROMBIN TIME: CPT | Mod: QW

## 2024-06-25 NOTE — PROGRESS NOTES
Patient identification verified with 2 identifiers.    Location: [facility] - [address] [address] [phone] option #1      Referring Physician: DR. PHAM  Enrollment/ Re-enrollment date: 25   INR Goal: 2.0-3.0  INR monitoring is per [facility] protocol.  Anticoagulation Medication: warfarin  Indication: Pulmonary Embolism (PE)    Subjective   Bleeding signs/symptoms: No    Bruising: No   Major bleeding event: No  Thrombosis signs/symptoms: No  Thromboembolic event: No  Missed doses: No  Extra doses: No  Medication changes: No  Dietary changes: No  Change in health: No  Change in activity: No  Alcohol: No  Other concerns: No    Upcoming Procedures:  Does the Patient Have any upcoming procedures that require interruption in anticoagulation therapy? yes  Does the patient require bridging? no      Anticoagulation Summary  As of 2024      INR goal:  2.0-3.0   TTR:  51.1% (8.6 mo)   INR used for dosin.20 (2024)   Weekly warfarin total:  41.25 mg               Assessment/Plan   Sub therapeutic as pt is holding warfarin d/t back surgery tomorrow .    1. New dose: maintain weekly dose    2. Next INR: 24 per pt availability      Education provided to patient during the visit:  Patient instructed to call in interim with questions, concerns and changes.   Patient educated on dietary consistency in vitamin k consumption.   Patient educated on compliance with dosing, follow up appointments, and prescribed plan of care.

## 2024-06-25 NOTE — H&P (VIEW-ONLY)
Patient identification verified with 2 identifiers.    Location: [facility] - [address] [address] [phone] option #1      Referring Physician: DR. PHAM  Enrollment/ Re-enrollment date: 25   INR Goal: 2.0-3.0  INR monitoring is per [facility] protocol.  Anticoagulation Medication: warfarin  Indication: Pulmonary Embolism (PE)    Subjective   Bleeding signs/symptoms: No    Bruising: No   Major bleeding event: No  Thrombosis signs/symptoms: No  Thromboembolic event: No  Missed doses: No  Extra doses: No  Medication changes: No  Dietary changes: No  Change in health: No  Change in activity: No  Alcohol: No  Other concerns: No    Upcoming Procedures:  Does the Patient Have any upcoming procedures that require interruption in anticoagulation therapy? yes  Does the patient require bridging? no      Anticoagulation Summary  As of 2024      INR goal:  2.0-3.0   TTR:  51.1% (8.6 mo)   INR used for dosin.20 (2024)   Weekly warfarin total:  41.25 mg               Assessment/Plan   Sub therapeutic as pt is holding warfarin d/t back surgery tomorrow .    1. New dose: maintain weekly dose    2. Next INR: 24 per pt availability      Education provided to patient during the visit:  Patient instructed to call in interim with questions, concerns and changes.   Patient educated on dietary consistency in vitamin k consumption.   Patient educated on compliance with dosing, follow up appointments, and prescribed plan of care.

## 2024-06-26 ENCOUNTER — APPOINTMENT (OUTPATIENT)
Dept: RADIOLOGY | Facility: HOSPITAL | Age: 74
End: 2024-06-26
Payer: MEDICARE

## 2024-06-26 ENCOUNTER — HOSPITAL ENCOUNTER (OUTPATIENT)
Facility: HOSPITAL | Age: 74
Discharge: HOME | End: 2024-06-27
Attending: NEUROLOGICAL SURGERY | Admitting: NEUROLOGICAL SURGERY
Payer: MEDICARE

## 2024-06-26 ENCOUNTER — ANESTHESIA (OUTPATIENT)
Dept: OPERATING ROOM | Facility: HOSPITAL | Age: 74
End: 2024-06-26
Payer: MEDICARE

## 2024-06-26 DIAGNOSIS — Z98.890 S/P LAMINECTOMY: ICD-10-CM

## 2024-06-26 DIAGNOSIS — Z74.09 IMPAIRED FUNCTIONAL MOBILITY AND ENDURANCE: ICD-10-CM

## 2024-06-26 DIAGNOSIS — S32.040A COMPRESSION FRACTURE OF L4 VERTEBRA, INITIAL ENCOUNTER (MULTI): ICD-10-CM

## 2024-06-26 DIAGNOSIS — M48.062 SPINAL STENOSIS OF LUMBAR REGION WITH NEUROGENIC CLAUDICATION: ICD-10-CM

## 2024-06-26 DIAGNOSIS — I26.99 PULMONARY EMBOLISM, UNSPECIFIED CHRONICITY, UNSPECIFIED PULMONARY EMBOLISM TYPE, UNSPECIFIED WHETHER ACUTE COR PULMONALE PRESENT (MULTI): ICD-10-CM

## 2024-06-26 DIAGNOSIS — G89.18 ACUTE POST-OPERATIVE PAIN: ICD-10-CM

## 2024-06-26 DIAGNOSIS — M47.816 LUMBAR SPONDYLOSIS: Primary | ICD-10-CM

## 2024-06-26 PROBLEM — M51.36 LUMBAR SPINAL STENOSIS DUE TO ADJACENT SEGMENT DISEASE AFTER FUSION PROCEDURE: Status: ACTIVE | Noted: 2024-06-26

## 2024-06-26 PROBLEM — M48.061 LUMBAR SPINAL STENOSIS DUE TO ADJACENT SEGMENT DISEASE AFTER FUSION PROCEDURE: Status: ACTIVE | Noted: 2024-06-26

## 2024-06-26 PROBLEM — Z98.1 LUMBAR SPINAL STENOSIS DUE TO ADJACENT SEGMENT DISEASE AFTER FUSION PROCEDURE: Status: ACTIVE | Noted: 2024-06-26

## 2024-06-26 PROBLEM — M54.16 LUMBAR RADICULOPATHY: Status: ACTIVE | Noted: 2024-06-26

## 2024-06-26 PROBLEM — M51.369 LUMBAR SPINAL STENOSIS DUE TO ADJACENT SEGMENT DISEASE AFTER FUSION PROCEDURE: Status: ACTIVE | Noted: 2024-06-26

## 2024-06-26 LAB
ANION GAP BLDA CALCULATED.4IONS-SCNC: 9 MMO/L (ref 10–25)
BASE EXCESS BLDA CALC-SCNC: -1.7 MMOL/L (ref -2–3)
BODY TEMPERATURE: 37 DEGREES CELSIUS
CA-I BLDA-SCNC: 1.15 MMOL/L (ref 1.1–1.33)
CHLORIDE BLDA-SCNC: 106 MMOL/L (ref 98–107)
GLUCOSE BLDA-MCNC: 140 MG/DL (ref 74–99)
HCO3 BLDA-SCNC: 23.7 MMOL/L (ref 22–26)
HCT VFR BLD EST: 35 % (ref 41–52)
HGB BLDA-MCNC: 11.5 G/DL (ref 13.5–17.5)
INHALED O2 CONCENTRATION: 65 %
LACTATE BLDA-SCNC: 0.8 MMOL/L (ref 0.4–2)
OXYHGB MFR BLDA: 96.9 % (ref 94–98)
PCO2 BLDA: 42 MM HG (ref 38–42)
PH BLDA: 7.36 PH (ref 7.38–7.42)
PO2 BLDA: 166 MM HG (ref 85–95)
POTASSIUM BLDA-SCNC: 4.1 MMOL/L (ref 3.5–5.3)
SAO2 % BLDA: 99 % (ref 94–100)
SODIUM BLDA-SCNC: 135 MMOL/L (ref 136–145)

## 2024-06-26 PROCEDURE — 2500000005 HC RX 250 GENERAL PHARMACY W/O HCPCS

## 2024-06-26 PROCEDURE — 63048 LAM FACETEC &FORAMOT EA ADDL: CPT | Performed by: NEUROLOGICAL SURGERY

## 2024-06-26 PROCEDURE — 7100000002 HC RECOVERY ROOM TIME - EACH INCREMENTAL 1 MINUTE: Performed by: NEUROLOGICAL SURGERY

## 2024-06-26 PROCEDURE — 3600000008 HC OR TIME - EACH INCREMENTAL 1 MINUTE - PROCEDURE LEVEL THREE: Performed by: NEUROLOGICAL SURGERY

## 2024-06-26 PROCEDURE — 2500000001 HC RX 250 WO HCPCS SELF ADMINISTERED DRUGS (ALT 637 FOR MEDICARE OP): Performed by: STUDENT IN AN ORGANIZED HEALTH CARE EDUCATION/TRAINING PROGRAM

## 2024-06-26 PROCEDURE — 2720000007 HC OR 272 NO HCPCS: Performed by: NEUROLOGICAL SURGERY

## 2024-06-26 PROCEDURE — 3600000003 HC OR TIME - INITIAL BASE CHARGE - PROCEDURE LEVEL THREE: Performed by: NEUROLOGICAL SURGERY

## 2024-06-26 PROCEDURE — C1889 IMPLANT/INSERT DEVICE, NOC: HCPCS | Performed by: NEUROLOGICAL SURGERY

## 2024-06-26 PROCEDURE — 3700000002 HC GENERAL ANESTHESIA TIME - EACH INCREMENTAL 1 MINUTE: Performed by: NEUROLOGICAL SURGERY

## 2024-06-26 PROCEDURE — 63047 LAM FACETEC & FORAMOT LUMBAR: CPT | Performed by: NEUROLOGICAL SURGERY

## 2024-06-26 PROCEDURE — G0378 HOSPITAL OBSERVATION PER HR: HCPCS

## 2024-06-26 PROCEDURE — 2500000005 HC RX 250 GENERAL PHARMACY W/O HCPCS: Performed by: NEUROLOGICAL SURGERY

## 2024-06-26 PROCEDURE — 84132 ASSAY OF SERUM POTASSIUM: CPT

## 2024-06-26 PROCEDURE — 2550000001 HC RX 255 CONTRASTS: Performed by: NEUROLOGICAL SURGERY

## 2024-06-26 PROCEDURE — A4649 SURGICAL SUPPLIES: HCPCS | Performed by: NEUROLOGICAL SURGERY

## 2024-06-26 PROCEDURE — 2780000003 HC OR 278 NO HCPCS: Performed by: NEUROLOGICAL SURGERY

## 2024-06-26 PROCEDURE — 7100000011 HC EXTENDED STAY RECOVERY HOURLY - NURSING UNIT

## 2024-06-26 PROCEDURE — 22514 PERQ VERTEBRAL AUGMENTATION: CPT | Performed by: NEUROLOGICAL SURGERY

## 2024-06-26 PROCEDURE — 2500000004 HC RX 250 GENERAL PHARMACY W/ HCPCS (ALT 636 FOR OP/ED): Mod: JG

## 2024-06-26 PROCEDURE — 2500000004 HC RX 250 GENERAL PHARMACY W/ HCPCS (ALT 636 FOR OP/ED): Performed by: STUDENT IN AN ORGANIZED HEALTH CARE EDUCATION/TRAINING PROGRAM

## 2024-06-26 PROCEDURE — 3700000001 HC GENERAL ANESTHESIA TIME - INITIAL BASE CHARGE: Performed by: NEUROLOGICAL SURGERY

## 2024-06-26 PROCEDURE — 7100000001 HC RECOVERY ROOM TIME - INITIAL BASE CHARGE: Performed by: NEUROLOGICAL SURGERY

## 2024-06-26 PROCEDURE — 2500000001 HC RX 250 WO HCPCS SELF ADMINISTERED DRUGS (ALT 637 FOR MEDICARE OP): Performed by: ANESTHESIOLOGY

## 2024-06-26 DEVICE — IMPLANTABLE DEVICE: Type: IMPLANTABLE DEVICE | Site: BACK | Status: FUNCTIONAL

## 2024-06-26 RX ORDER — FENTANYL CITRATE 50 UG/ML
INJECTION, SOLUTION INTRAMUSCULAR; INTRAVENOUS AS NEEDED
Status: DISCONTINUED | OUTPATIENT
Start: 2024-06-26 | End: 2024-06-26

## 2024-06-26 RX ORDER — OXYCODONE HYDROCHLORIDE 5 MG/1
2.5 TABLET ORAL EVERY 4 HOURS PRN
Status: DISCONTINUED | OUTPATIENT
Start: 2024-06-26 | End: 2024-06-27 | Stop reason: HOSPADM

## 2024-06-26 RX ORDER — ESMOLOL HYDROCHLORIDE 10 MG/ML
INJECTION INTRAVENOUS AS NEEDED
Status: DISCONTINUED | OUTPATIENT
Start: 2024-06-26 | End: 2024-06-26

## 2024-06-26 RX ORDER — HYDROMORPHONE HYDROCHLORIDE 1 MG/ML
INJECTION, SOLUTION INTRAMUSCULAR; INTRAVENOUS; SUBCUTANEOUS AS NEEDED
Status: DISCONTINUED | OUTPATIENT
Start: 2024-06-26 | End: 2024-06-26

## 2024-06-26 RX ORDER — HYDROMORPHONE HYDROCHLORIDE 1 MG/ML
0.5 INJECTION, SOLUTION INTRAMUSCULAR; INTRAVENOUS; SUBCUTANEOUS EVERY 5 MIN PRN
Status: DISCONTINUED | OUTPATIENT
Start: 2024-06-26 | End: 2024-06-26 | Stop reason: HOSPADM

## 2024-06-26 RX ORDER — ONDANSETRON HYDROCHLORIDE 2 MG/ML
4 INJECTION, SOLUTION INTRAVENOUS EVERY 8 HOURS PRN
Status: DISCONTINUED | OUTPATIENT
Start: 2024-06-26 | End: 2024-06-27 | Stop reason: HOSPADM

## 2024-06-26 RX ORDER — ONDANSETRON HYDROCHLORIDE 2 MG/ML
4 INJECTION, SOLUTION INTRAVENOUS ONCE AS NEEDED
Status: DISCONTINUED | OUTPATIENT
Start: 2024-06-26 | End: 2024-06-26 | Stop reason: HOSPADM

## 2024-06-26 RX ORDER — PHENYLEPHRINE HCL IN 0.9% NACL 0.4MG/10ML
SYRINGE (ML) INTRAVENOUS AS NEEDED
Status: DISCONTINUED | OUTPATIENT
Start: 2024-06-26 | End: 2024-06-26

## 2024-06-26 RX ORDER — CHOLECALCIFEROL (VITAMIN D3) 25 MCG
2000 TABLET ORAL DAILY
Status: DISCONTINUED | OUTPATIENT
Start: 2024-06-26 | End: 2024-06-27 | Stop reason: HOSPADM

## 2024-06-26 RX ORDER — SODIUM CHLORIDE 9 MG/ML
75 INJECTION, SOLUTION INTRAVENOUS CONTINUOUS
Status: DISCONTINUED | OUTPATIENT
Start: 2024-06-26 | End: 2024-06-27 | Stop reason: HOSPADM

## 2024-06-26 RX ORDER — HEPARIN SODIUM 5000 [USP'U]/ML
5000 INJECTION, SOLUTION INTRAVENOUS; SUBCUTANEOUS EVERY 8 HOURS
Status: DISCONTINUED | OUTPATIENT
Start: 2024-06-27 | End: 2024-06-27 | Stop reason: HOSPADM

## 2024-06-26 RX ORDER — LANOLIN ALCOHOL/MO/W.PET/CERES
1000 CREAM (GRAM) TOPICAL DAILY
Status: DISCONTINUED | OUTPATIENT
Start: 2024-06-26 | End: 2024-06-27 | Stop reason: HOSPADM

## 2024-06-26 RX ORDER — NALOXONE HYDROCHLORIDE 0.4 MG/ML
0.2 INJECTION, SOLUTION INTRAMUSCULAR; INTRAVENOUS; SUBCUTANEOUS EVERY 5 MIN PRN
Status: DISCONTINUED | OUTPATIENT
Start: 2024-06-26 | End: 2024-06-27 | Stop reason: HOSPADM

## 2024-06-26 RX ORDER — ACETAMINOPHEN 325 MG/1
650 TABLET ORAL EVERY 4 HOURS PRN
Status: DISCONTINUED | OUTPATIENT
Start: 2024-06-26 | End: 2024-06-26 | Stop reason: HOSPADM

## 2024-06-26 RX ORDER — CYCLOBENZAPRINE HCL 10 MG
5 TABLET ORAL 3 TIMES DAILY
Status: DISCONTINUED | OUTPATIENT
Start: 2024-06-26 | End: 2024-06-27 | Stop reason: HOSPADM

## 2024-06-26 RX ORDER — ROCURONIUM BROMIDE 10 MG/ML
INJECTION, SOLUTION INTRAVENOUS AS NEEDED
Status: DISCONTINUED | OUTPATIENT
Start: 2024-06-26 | End: 2024-06-26

## 2024-06-26 RX ORDER — GABAPENTIN 100 MG/1
100 CAPSULE ORAL 3 TIMES DAILY
Status: DISCONTINUED | OUTPATIENT
Start: 2024-06-26 | End: 2024-06-27 | Stop reason: HOSPADM

## 2024-06-26 RX ORDER — OXYCODONE HYDROCHLORIDE 5 MG/1
5 TABLET ORAL EVERY 4 HOURS PRN
Status: DISCONTINUED | OUTPATIENT
Start: 2024-06-26 | End: 2024-06-27 | Stop reason: HOSPADM

## 2024-06-26 RX ORDER — METOCLOPRAMIDE 10 MG/1
10 TABLET ORAL EVERY 6 HOURS PRN
Status: DISCONTINUED | OUTPATIENT
Start: 2024-06-26 | End: 2024-06-27 | Stop reason: HOSPADM

## 2024-06-26 RX ORDER — AMLODIPINE BESYLATE 5 MG/1
5 TABLET ORAL DAILY
Status: DISCONTINUED | OUTPATIENT
Start: 2024-06-26 | End: 2024-06-27 | Stop reason: HOSPADM

## 2024-06-26 RX ORDER — ACETAMINOPHEN 325 MG/1
650 TABLET ORAL EVERY 6 HOURS
Status: DISCONTINUED | OUTPATIENT
Start: 2024-06-26 | End: 2024-06-27 | Stop reason: HOSPADM

## 2024-06-26 RX ORDER — POLYETHYLENE GLYCOL 3350 17 G/17G
17 POWDER, FOR SOLUTION ORAL DAILY
Status: DISCONTINUED | OUTPATIENT
Start: 2024-06-26 | End: 2024-06-27 | Stop reason: HOSPADM

## 2024-06-26 RX ORDER — DROPERIDOL 2.5 MG/ML
0.62 INJECTION, SOLUTION INTRAMUSCULAR; INTRAVENOUS ONCE AS NEEDED
Status: DISCONTINUED | OUTPATIENT
Start: 2024-06-26 | End: 2024-06-26 | Stop reason: HOSPADM

## 2024-06-26 RX ORDER — CALCIUM CARBONATE 500(1250)
1250 TABLET ORAL DAILY
Status: DISCONTINUED | OUTPATIENT
Start: 2024-06-26 | End: 2024-06-27 | Stop reason: HOSPADM

## 2024-06-26 RX ORDER — ONDANSETRON HYDROCHLORIDE 2 MG/ML
INJECTION, SOLUTION INTRAVENOUS AS NEEDED
Status: DISCONTINUED | OUTPATIENT
Start: 2024-06-26 | End: 2024-06-26

## 2024-06-26 RX ORDER — CLINDAMYCIN PHOSPHATE 600 MG/50ML
INJECTION, SOLUTION INTRAVENOUS AS NEEDED
Status: DISCONTINUED | OUTPATIENT
Start: 2024-06-26 | End: 2024-06-26

## 2024-06-26 RX ORDER — MIDAZOLAM HYDROCHLORIDE 1 MG/ML
INJECTION INTRAMUSCULAR; INTRAVENOUS AS NEEDED
Status: DISCONTINUED | OUTPATIENT
Start: 2024-06-26 | End: 2024-06-26

## 2024-06-26 RX ORDER — LABETALOL HYDROCHLORIDE 5 MG/ML
10 INJECTION, SOLUTION INTRAVENOUS EVERY 10 MIN PRN
Status: DISCONTINUED | OUTPATIENT
Start: 2024-06-26 | End: 2024-06-27 | Stop reason: HOSPADM

## 2024-06-26 RX ORDER — SODIUM CHLORIDE, SODIUM LACTATE, POTASSIUM CHLORIDE, CALCIUM CHLORIDE 600; 310; 30; 20 MG/100ML; MG/100ML; MG/100ML; MG/100ML
100 INJECTION, SOLUTION INTRAVENOUS CONTINUOUS
Status: DISCONTINUED | OUTPATIENT
Start: 2024-06-26 | End: 2024-06-26 | Stop reason: HOSPADM

## 2024-06-26 RX ORDER — LIDOCAINE HYDROCHLORIDE 10 MG/ML
0.1 INJECTION INFILTRATION; PERINEURAL ONCE
Status: DISCONTINUED | OUTPATIENT
Start: 2024-06-26 | End: 2024-06-26 | Stop reason: HOSPADM

## 2024-06-26 RX ORDER — OXYCODONE HYDROCHLORIDE 5 MG/1
10 TABLET ORAL EVERY 4 HOURS PRN
Status: DISCONTINUED | OUTPATIENT
Start: 2024-06-26 | End: 2024-06-27 | Stop reason: HOSPADM

## 2024-06-26 RX ORDER — MULTIVIT-MIN/IRON FUM/FOLIC AC 7.5 MG-4
1 TABLET ORAL DAILY
Status: DISCONTINUED | OUTPATIENT
Start: 2024-06-26 | End: 2024-06-27 | Stop reason: HOSPADM

## 2024-06-26 RX ORDER — HYDROMORPHONE HYDROCHLORIDE 1 MG/ML
0.2 INJECTION, SOLUTION INTRAMUSCULAR; INTRAVENOUS; SUBCUTANEOUS EVERY 4 HOURS PRN
Status: DISCONTINUED | OUTPATIENT
Start: 2024-06-26 | End: 2024-06-27 | Stop reason: HOSPADM

## 2024-06-26 RX ORDER — VANCOMYCIN HYDROCHLORIDE 1 G/20ML
INJECTION, POWDER, LYOPHILIZED, FOR SOLUTION INTRAVENOUS AS NEEDED
Status: DISCONTINUED | OUTPATIENT
Start: 2024-06-26 | End: 2024-06-26

## 2024-06-26 RX ORDER — AMOXICILLIN 250 MG
2 CAPSULE ORAL 2 TIMES DAILY
Status: DISCONTINUED | OUTPATIENT
Start: 2024-06-26 | End: 2024-06-27 | Stop reason: HOSPADM

## 2024-06-26 RX ORDER — OXYCODONE HYDROCHLORIDE 5 MG/1
5 TABLET ORAL EVERY 4 HOURS PRN
Status: DISCONTINUED | OUTPATIENT
Start: 2024-06-26 | End: 2024-06-26 | Stop reason: HOSPADM

## 2024-06-26 RX ORDER — ONDANSETRON 4 MG/1
4 TABLET, FILM COATED ORAL EVERY 8 HOURS PRN
Status: DISCONTINUED | OUTPATIENT
Start: 2024-06-26 | End: 2024-06-27 | Stop reason: HOSPADM

## 2024-06-26 RX ORDER — FENTANYL CITRATE 50 UG/ML
12.5 INJECTION, SOLUTION INTRAMUSCULAR; INTRAVENOUS EVERY 5 MIN PRN
Status: DISCONTINUED | OUTPATIENT
Start: 2024-06-26 | End: 2024-06-26 | Stop reason: HOSPADM

## 2024-06-26 RX ORDER — PROPOFOL 10 MG/ML
INJECTION, EMULSION INTRAVENOUS AS NEEDED
Status: DISCONTINUED | OUTPATIENT
Start: 2024-06-26 | End: 2024-06-26

## 2024-06-26 RX ORDER — LIDOCAINE HCL/PF 100 MG/5ML
SYRINGE (ML) INTRAVENOUS AS NEEDED
Status: DISCONTINUED | OUTPATIENT
Start: 2024-06-26 | End: 2024-06-26

## 2024-06-26 RX ORDER — METOCLOPRAMIDE HYDROCHLORIDE 5 MG/ML
10 INJECTION INTRAMUSCULAR; INTRAVENOUS EVERY 6 HOURS PRN
Status: DISCONTINUED | OUTPATIENT
Start: 2024-06-26 | End: 2024-06-27 | Stop reason: HOSPADM

## 2024-06-26 RX ORDER — DIPHENHYDRAMINE HYDROCHLORIDE 50 MG/ML
12.5 INJECTION INTRAMUSCULAR; INTRAVENOUS EVERY 6 HOURS PRN
Status: DISCONTINUED | OUTPATIENT
Start: 2024-06-26 | End: 2024-06-27 | Stop reason: HOSPADM

## 2024-06-26 RX ORDER — HYDROMORPHONE HYDROCHLORIDE 1 MG/ML
0.2 INJECTION, SOLUTION INTRAMUSCULAR; INTRAVENOUS; SUBCUTANEOUS EVERY 5 MIN PRN
Status: DISCONTINUED | OUTPATIENT
Start: 2024-06-26 | End: 2024-06-26 | Stop reason: HOSPADM

## 2024-06-26 RX ORDER — HYDRALAZINE HYDROCHLORIDE 20 MG/ML
10 INJECTION INTRAMUSCULAR; INTRAVENOUS
Status: DISCONTINUED | OUTPATIENT
Start: 2024-06-26 | End: 2024-06-27 | Stop reason: HOSPADM

## 2024-06-26 SDOH — SOCIAL STABILITY: SOCIAL INSECURITY: ABUSE: ADULT

## 2024-06-26 SDOH — SOCIAL STABILITY: SOCIAL INSECURITY: HAVE YOU HAD THOUGHTS OF HARMING ANYONE ELSE?: NO

## 2024-06-26 SDOH — HEALTH STABILITY: MENTAL HEALTH: CURRENT SMOKER: 0

## 2024-06-26 SDOH — SOCIAL STABILITY: SOCIAL INSECURITY: DO YOU FEEL UNSAFE GOING BACK TO THE PLACE WHERE YOU ARE LIVING?: NO

## 2024-06-26 SDOH — SOCIAL STABILITY: SOCIAL INSECURITY: HAS ANYONE EVER THREATENED TO HURT YOUR FAMILY OR YOUR PETS?: NO

## 2024-06-26 SDOH — SOCIAL STABILITY: SOCIAL INSECURITY: ARE YOU OR HAVE YOU BEEN THREATENED OR ABUSED PHYSICALLY, EMOTIONALLY, OR SEXUALLY BY ANYONE?: NO

## 2024-06-26 SDOH — SOCIAL STABILITY: SOCIAL INSECURITY: WERE YOU ABLE TO COMPLETE ALL THE BEHAVIORAL HEALTH SCREENINGS?: YES

## 2024-06-26 SDOH — SOCIAL STABILITY: SOCIAL INSECURITY: ARE THERE ANY APPARENT SIGNS OF INJURIES/BEHAVIORS THAT COULD BE RELATED TO ABUSE/NEGLECT?: NO

## 2024-06-26 SDOH — SOCIAL STABILITY: SOCIAL INSECURITY: HAVE YOU HAD ANY THOUGHTS OF HARMING ANYONE ELSE?: NO

## 2024-06-26 SDOH — SOCIAL STABILITY: SOCIAL INSECURITY: DOES ANYONE TRY TO KEEP YOU FROM HAVING/CONTACTING OTHER FRIENDS OR DOING THINGS OUTSIDE YOUR HOME?: NO

## 2024-06-26 SDOH — SOCIAL STABILITY: SOCIAL INSECURITY: DO YOU FEEL ANYONE HAS EXPLOITED OR TAKEN ADVANTAGE OF YOU FINANCIALLY OR OF YOUR PERSONAL PROPERTY?: NO

## 2024-06-26 ASSESSMENT — LIFESTYLE VARIABLES
HAS A RELATIVE, FRIEND, DOCTOR, OR ANOTHER HEALTH PROFESSIONAL EXPRESSED CONCERN ABOUT YOUR DRINKING OR SUGGESTED YOU CUT DOWN: NO
SKIP TO QUESTIONS 9-10: 1
HOW OFTEN DURING THE LAST YEAR HAVE YOU HAD A FEELING OF GUILT OR REMORSE AFTER DRINKING: NEVER
AUDIT TOTAL SCORE: 0
HOW OFTEN DURING THE LAST YEAR HAVE YOU NEEDED AN ALCOHOLIC DRINK FIRST THING IN THE MORNING TO GET YOURSELF GOING AFTER A NIGHT OF HEAVY DRINKING: NEVER
SUBSTANCE_ABUSE_PAST_12_MONTHS: NO
HOW OFTEN DURING THE LAST YEAR HAVE YOU FOUND THAT YOU WERE NOT ABLE TO STOP DRINKING ONCE YOU HAD STARTED: NEVER
PRESCIPTION_ABUSE_PAST_12_MONTHS: NO
AUDIT-C TOTAL SCORE: 3
HOW OFTEN DO YOU HAVE 6 OR MORE DRINKS ON ONE OCCASION: NEVER
AUDIT-C TOTAL SCORE: 3
HOW MANY STANDARD DRINKS CONTAINING ALCOHOL DO YOU HAVE ON A TYPICAL DAY: 1 OR 2
AUDIT TOTAL SCORE: 3
HOW OFTEN DO YOU HAVE A DRINK CONTAINING ALCOHOL: 2-3 TIMES A WEEK
HOW OFTEN DURING THE LAST YEAR HAVE YOU FAILED TO DO WHAT WAS NORMALLY EXPECTED FROM YOU BECAUSE OF DRINKING: NEVER
HOW OFTEN DURING THE LAST YEAR HAVE YOU BEEN UNABLE TO REMEMBER WHAT HAPPENED THE NIGHT BEFORE BECAUSE YOU HAD BEEN DRINKING: NEVER
HAVE YOU OR SOMEONE ELSE BEEN INJURED AS A RESULT OF YOUR DRINKING: NO

## 2024-06-26 ASSESSMENT — PAIN SCALES - GENERAL
PAINLEVEL_OUTOF10: 4
PAINLEVEL_OUTOF10: 0 - NO PAIN
PAINLEVEL_OUTOF10: 2
PAINLEVEL_OUTOF10: 4
PAINLEVEL_OUTOF10: 3
PAINLEVEL_OUTOF10: 1
PAINLEVEL_OUTOF10: 1
PAINLEVEL_OUTOF10: 3
PAINLEVEL_OUTOF10: 0 - NO PAIN
PAIN_LEVEL: 2
PAINLEVEL_OUTOF10: 4
PAINLEVEL_OUTOF10: 3
PAINLEVEL_OUTOF10: 1

## 2024-06-26 ASSESSMENT — COGNITIVE AND FUNCTIONAL STATUS - GENERAL
MOBILITY SCORE: 21
DRESSING REGULAR LOWER BODY CLOTHING: A LITTLE
STANDING UP FROM CHAIR USING ARMS: A LITTLE
CLIMB 3 TO 5 STEPS WITH RAILING: A LITTLE
WALKING IN HOSPITAL ROOM: A LITTLE
PATIENT BASELINE BEDBOUND: NO
DAILY ACTIVITIY SCORE: 22
HELP NEEDED FOR BATHING: A LITTLE

## 2024-06-26 ASSESSMENT — ACTIVITIES OF DAILY LIVING (ADL)
FEEDING YOURSELF: INDEPENDENT
GROOMING: INDEPENDENT
ADEQUATE_TO_COMPLETE_ADL: YES
HEARING - LEFT EAR: FUNCTIONAL
LACK_OF_TRANSPORTATION: NO
JUDGMENT_ADEQUATE_SAFELY_COMPLETE_DAILY_ACTIVITIES: YES
BATHING: INDEPENDENT
PATIENT'S MEMORY ADEQUATE TO SAFELY COMPLETE DAILY ACTIVITIES?: YES
TOILETING: INDEPENDENT
HEARING - RIGHT EAR: FUNCTIONAL
DRESSING YOURSELF: INDEPENDENT
ASSISTIVE_DEVICE: EYEGLASSES
WALKS IN HOME: INDEPENDENT

## 2024-06-26 ASSESSMENT — PAIN - FUNCTIONAL ASSESSMENT
PAIN_FUNCTIONAL_ASSESSMENT: 0-10

## 2024-06-26 ASSESSMENT — PATIENT HEALTH QUESTIONNAIRE - PHQ9
2. FEELING DOWN, DEPRESSED OR HOPELESS: NOT AT ALL
SUM OF ALL RESPONSES TO PHQ9 QUESTIONS 1 & 2: 0
1. LITTLE INTEREST OR PLEASURE IN DOING THINGS: NOT AT ALL

## 2024-06-26 NOTE — ANESTHESIA PROCEDURE NOTES
Airway  Date/Time: 6/26/2024 8:48 AM  Urgency: elective    Airway not difficult    Staffing  Performed: [facility]   Authorized by: Kash Ohara MD    Performed by: YOKO Rosas  Patient location during procedure: OR    Indications and Patient Condition  Indications for airway management: anesthesia and airway protection  Sedation level: deep  Preoxygenated: yes  Patient position: sniffing  MILS not maintained throughout  Mask difficulty assessment: 1 - vent by mask    Final Airway Details  Final airway type: endotracheal airway      Successful airway: ETT  Cuffed: yes   Successful intubation technique: video laryngoscopy  Facilitating devices/methods: intubating stylet  Endotracheal tube insertion site: oral  Blade: Aura  Blade size: #4  ETT size (mm): 7.5  Cormack-Lehane Classification: grade IIa - partial view of glottis  Placement verified by: chest auscultation and capnometry   Measured from: lips  ETT to lips (cm): 22  Number of attempts at approach: 1

## 2024-06-26 NOTE — CARE PLAN
The patient's goals for the shift include  Get something to eat and walk     The clinical goals for the shift include  Pt will remain free from falls/injury. Pt will get OOB day of surgery. Pain control    Problem: Skin  Goal: Prevent/minimize sheer/friction injuries  Outcome: Progressing  Goal: Promote/optimize nutrition  Outcome: Progressing     Problem: Pain  Goal: Takes deep breaths with improved pain control throughout the shift  Outcome: Progressing  Goal: Walks with improved pain control throughout the shift  Outcome: Progressing  Goal: Performs ADL's with improved pain control throughout shift  Outcome: Progressing  Goal: Participates in PT with improved pain control throughout the shift  Outcome: Progressing     Problem: Fall/Injury  Goal: Be free from injury by end of the shift  Outcome: Progressing

## 2024-06-26 NOTE — ANESTHESIA PROCEDURE NOTES
Peripheral IV  Date/Time: 6/26/2024 9:05 AM      Placement  Needle size: 14 G  Laterality: right  Location: hand  Site prep: alcohol  Technique: anatomical landmarks  Attempts: 1

## 2024-06-26 NOTE — ANESTHESIA PREPROCEDURE EVALUATION
Patient: Louis Rainey    Procedure Information       Date/Time: 24 0815    Procedure: minimally invasive L2-3 and L3-4 laminectomy, partial medial facetectomies bialteral and kyphoplasty for L4 compression fracture (Bilateral)    Location: [facility] OR 25 / [facility] OR    Surgeons: Gio Atkinson MD            Relevant Problems   Anesthesia  Past Surgical History:  No date: COLONOSCOPY  No date: ESOPHAGOGASTRODUODENOSCOPY  2019: OTHER SURGICAL HISTORY      Comment:  Knee surgery  2019: OTHER SURGICAL HISTORY      Comment:  Shoulder surgery  2019: OTHER SURGICAL HISTORY      Comment:  Gallbladder surgery        Cardiac  EKG 24:    Sinus rhythm with occasional and consecutive Premature ventricular complexes  Prolonged QT  Abnormal ECG  When compared with ECG of 17-AUG-2023 15:08,  QT has shortened  Confirmed by Evans Canas ([id number]) on 2024 5:41:01 PM       TTE :    CONCLUSIONS:  1. Left ventricular systolic function is normal with a 55-60% estimated ejection fraction.  2. Spectral Doppler shows an impaired relaxation pattern of left ventricular diastolic filling.  3. The left atrium is mild to moderately dilated.  4. RVSP within normal limits.       (+) Bigeminy   (+) Cardiac arrhythmia   (+) Essential (primary) hypertension   (+) Paroxysmal atrial fibrillation (Multi)      Pulmonary  Social History    Tobacco Use      Smoking status: Former        Packs/day: 0.00        Types: Cigarettes        Quit date: 2019        Years since quittin.1        Passive exposure: Past      Smokeless tobacco: Never       (+) Dyspnea on exertion   (+) Pulmonary embolism (Multi)   (+) Pulmonary embolism, unspecified chronicity, unspecified pulmonary embolism type, unspecified whether acute cor pulmonale present (Multi)      GI   (+) Gastroesophageal reflux disease   (+) Rectal bleeding      /Renal   (+) Adenocarcinoma of prostate (Multi)      Hematology   (+) Anemia   (+)  "Chronic anticoagulation      Musculoskeletal   (+) Osteoarthritis of hip   (+) Spinal stenosis of lumbar region with neurogenic claudication      HEENT   (+) Sensorineural hearing loss (SNHL) of both ears      ID   (+) Shingles   (+) Viral URI with cough       Clinical information reviewed:               HPI as per PAT 6.12.24:  \"HPI: Patient is a 73-year-old male scheduled for minimally invasive L2-3 and L3-4 laminectomy, partial medial facetectomies bilateral and kyphoplasty for L4 compression fracture on June 26, 2024 for treatment of lumbar spinal stenosis and L4 compression fracture.  The patient is referred by Dr. 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, prostate cancer status post radiation therapy following with oncology.\"    Past Medical History:   Diagnosis Date    Anemia     Arrhythmia     SVT/ PVCs-([place] Feb-March2022), AFIB    Arthritis     Forrest's esophagus     CKD (chronic kidney disease)     stage III    Erectile dysfunction     GERD (gastroesophageal reflux disease)     HTN (hypertension)     Low back pain     Lumbar compression fracture (Multi)     Lumbar spondylosis     PE (pulmonary thromboembolism) (Multi) 2013    on Coumadin    Prostate cancer (Multi)     s/p RT follows with Paul Navarrete, CNP    Spinal stenosis     Syncope             NPO Detail:  No data recorded     Physical Exam    Airway  Mallampati: I  TM distance: >3 FB  Neck ROM: limited     Cardiovascular - normal exam  Rhythm: regular  Rate: normal     Dental   Comments: Missing several teeth.  Loose teeth.   Pulmonary - normal exam  Breath sounds clear to auscultation     Abdominal            Anesthesia Plan    History of general anesthesia?: yes  History of complications of general anesthesia?: no    ASA 3     general "     The patient is not a current smoker.    Postoperative administration of opioids is intended.  Anesthetic plan and risks discussed with patient.  Use of blood products discussed with patient who consented to blood products.    Plan discussed with CAA.

## 2024-06-26 NOTE — BRIEF OP NOTE
Date: 2024  OR Location: [facility] OR    Name: Louis Rainey, : 1950, Age: 73 y.o., MRN: [id number], Sex: male    Diagnosis  Pre-op Diagnosis     * Spinal stenosis of lumbar region with neurogenic claudication [M48.062]     * Compression fracture of L4 vertebra, initial encounter (Multi) [S32.040A] Post-op Diagnosis     * Spinal stenosis of lumbar region with neurogenic claudication [M48.062]     * Compression fracture of L4 vertebra, initial encounter (Multi) [S32.040A]     Procedures  minimally invasive L2-3 and L3-4 laminectomy, partial medial facetectomies bialteral and kyphoplasty for L4 compression fracture  43978 - MT AGUILAR FACETECTOMY & FORAMOTOMY 1 VRT SGM LUMBAR    MT AGUILAR FACETECTOMY&FORAMOT 1 VRT SGM EA ADDL SGM [[id number]]  MT PERQ VERT AGMNTJ CAVITY CRTJ UNI/BI CANNULJ LMBR [[id number]]  Surgeons      * Gio Atkinson - Primary    Resident/Fellow/Other Assistant:  Surgeons and Role:     * Dre Linda MD PhD - Assisting     * Antonio Ponce MD - Resident - Assisting    Procedure Summary  Anesthesia: General  ASA: III  Anesthesia Staff: Anesthesiologist: Kash Ohara MD  CRNA: VEDA Quintero-ZHANG  C-AA: YOKO Argueta; YOKO Rosas  ELMA: Emmanuel Viera  Estimated Blood Loss: 100mL  Intra-op Medications:   Administrations occurring from 0815 to 1215 on 24:   Medication Name Total Dose   lidocaine-epinephrine PF (Xylocaine W/EPI) 1 %-1:200,000 injection 10 mL   iohexol (OMNIPaque) 300 mg iodine/mL solution 30 mL              Anesthesia Record               Intraprocedure I/O Totals          Intake    LR bolus 1800.00 mL    clindamycin (Cleocin) IVPB 600 mg/50 mL D5 (premix) 75.00 mL    Total Intake 1875 mL       Output    Urine 250 mL    Est. Blood Loss 100 mL    Total Output 350 mL       Net    Net Volume 1525 mL          Specimen: No specimens collected     Staff:   Circulator: Herminio Clinton Person: Mishel Yancey Scrub: Joshua Yancey Circulator: Alysha  Circulator:  Dea  Relief Scrub: Dea          Findings: good decompression, cement placement    Complications:  None; patient tolerated the procedure well.     Disposition: PACU - hemodynamically stable.  Condition: stable  Specimens Collected: No specimens collected  Attending Attestation:     Gio Atkinson  Phone Number: [phone]

## 2024-06-26 NOTE — ANESTHESIA PROCEDURE NOTES
Arterial Line:    Date/Time: 6/26/2024 9:06 AM    Staffing  Performed: attending and ELMA   Authorized by: Kash Ohara MD    Performed by: YOKO Rosas    An arterial line was placed. Procedure performed using surface landmarks.in the OR for the following indication(s): continuous blood pressure monitoring and blood sampling needed.    A 20 gauge (size), 1 and 3/4 inch (length), Angiocath (type) catheter was placed into the Left radial artery, secured by Tegaderm,   Seldinger technique used.  Events:  patient tolerated procedure well with no complications.      Additional notes:  2 attempts, first by ELMA, second by attending

## 2024-06-26 NOTE — HOSPITAL COURSE
Louis Rainey is a 73 y.o. male with a past medical history of HTN, CKD, afib, PE (2013, on Warfarin) and prostate CA (s/p RT) who presented with low back pain and L4 compression  fracture. Patient presented for elective MIS L2-3, L4-3 lami, L4 kyphoplasty. Owen removed POD#1 without complication. PT/OT evaluated patient and recommended low intensity level of continued care. On the day of discharge, the patient was seen and evaluated by the neurosurgery team and deemed suitable for discharge home with referral placed to home health care.  There were no significant events overnight. Vitals were reviewed and within normal limits. Labs were stable at discharge. On day of discharge the patient was tolerating a diet, pain was controlled on PO pain medication, was ambulating well and voiding spontaneously. The patient was given detailed discharge instructions and were scheduled to follow up as an outpatient.

## 2024-06-26 NOTE — ANESTHESIA POSTPROCEDURE EVALUATION
Patient: Louis Rainey    Procedure Summary       Date: 06/26/24 Room / Location: [facility] OR 25 / Virtual [facility] OR    Anesthesia Start: 0833 Anesthesia Stop: 1334    Procedure: minimally invasive L2-3 and L3-4 laminectomy, partial medial facetectomies bialteral and kyphoplasty for L4 compression fracture (Bilateral) Diagnosis:       Spinal stenosis of lumbar region with neurogenic claudication      Compression fracture of L4 vertebra, initial encounter (Multi)      (Spinal stenosis of lumbar region with neurogenic claudication [M48.062])      (Compression fracture of L4 vertebra, initial encounter (Multi) [S32.040A])    Surgeons: Gio Atkinson MD Responsible Provider: Kash Ohara MD    Anesthesia Type: general ASA Status: 3            Anesthesia Type: general    Vitals Value Taken Time   /79 06/26/24 1333   Temp 36 °C (96.8 °F) 06/26/24 1333   Pulse 79 06/26/24 1334   Resp 12 06/26/24 1334   SpO2 98 % 06/26/24 1334   Vitals shown include unfiled device data.    Anesthesia Post Evaluation    Patient location during evaluation: PACU  Patient participation: complete - patient participated  Level of consciousness: sleepy but conscious  Pain score: 2  Pain management: adequate  Airway patency: patent  Cardiovascular status: acceptable and stable  Respiratory status: acceptable and face mask  Hydration status: acceptable  Postoperative Nausea and Vomiting: none        There were no known notable events for this encounter.

## 2024-06-26 NOTE — SIGNIFICANT EVENT
Procedure: 18f coude Cymro rollins catheter placement    Indication: Lack of urine output during surgical case    Findings: Distally displaced 14 Cymro Rollins catheter, uncomplicated 18 Cymro coudé Rollins catheter placement    Details:    Urology was asked to come into the OR due to lack of urine output during case.  A 14 Cymro coudé catheter was placed prior to procedure start.  Upon initial examination the catheter was found to be distally displaced.  The catheter balloon was taken down and attempted to advance however was unable to advance Rollins due to lack of rigidity with a 14 Cymro catheter. 14f catheter was removed. Under typical sterile catheter procedures, an 18 Cymro catheter was placed with no resistance, complications.  Clear yellow urine without evidence of clots or hematuria was appreciated after hubbing of catheter.  10 cc of sterile water was injected into Rollins balloon.  This concluded the procedure.    Recommendations:  - Rollins catheter management per primary team  - Future catheter-related interventions do NOT need to be per urology  - Patient can follow up with urology as an outpatient as desired; has been seen by  urology in past (Dr. Beltran)    Urology will sign off at this time    Ermelinda Duran MD   Urology [place]  Adult Urology Pager: [phone]  Pediatric Urology Pager: [phone]

## 2024-06-27 VITALS
BODY MASS INDEX: 28.99 KG/M2 | OXYGEN SATURATION: 93 % | WEIGHT: 214 LBS | RESPIRATION RATE: 18 BRPM | TEMPERATURE: 97.3 F | SYSTOLIC BLOOD PRESSURE: 153 MMHG | HEIGHT: 72 IN | DIASTOLIC BLOOD PRESSURE: 82 MMHG | HEART RATE: 56 BPM

## 2024-06-27 PROBLEM — S32.040A COMPRESSION FRACTURE OF FOURTH LUMBAR VERTEBRA (MULTI): Status: RESOLVED | Noted: 2024-05-14 | Resolved: 2024-06-27

## 2024-06-27 LAB
ALBUMIN SERPL BCP-MCNC: 3.7 G/DL (ref 3.4–5)
ANION GAP SERPL CALC-SCNC: 15 MMOL/L (ref 10–20)
BUN SERPL-MCNC: 23 MG/DL (ref 6–23)
CALCIUM SERPL-MCNC: 9.1 MG/DL (ref 8.6–10.6)
CHLORIDE SERPL-SCNC: 104 MMOL/L (ref 98–107)
CO2 SERPL-SCNC: 23 MMOL/L (ref 21–32)
CREAT SERPL-MCNC: 1.46 MG/DL (ref 0.5–1.3)
EGFRCR SERPLBLD CKD-EPI 2021: 50 ML/MIN/1.73M*2
ERYTHROCYTE [DISTWIDTH] IN BLOOD BY AUTOMATED COUNT: 15.7 % (ref 11.5–14.5)
GLUCOSE SERPL-MCNC: 140 MG/DL (ref 74–99)
HCT VFR BLD AUTO: 36.9 % (ref 41–52)
HGB BLD-MCNC: 11.8 G/DL (ref 13.5–17.5)
MCH RBC QN AUTO: 29.8 PG (ref 26–34)
MCHC RBC AUTO-ENTMCNC: 32 G/DL (ref 32–36)
MCV RBC AUTO: 93 FL (ref 80–100)
NRBC BLD-RTO: 0 /100 WBCS (ref 0–0)
PHOSPHATE SERPL-MCNC: 2.6 MG/DL (ref 2.5–4.9)
PLATELET # BLD AUTO: 165 X10*3/UL (ref 150–450)
POTASSIUM SERPL-SCNC: 4.6 MMOL/L (ref 3.5–5.3)
RBC # BLD AUTO: 3.96 X10*6/UL (ref 4.5–5.9)
SODIUM SERPL-SCNC: 137 MMOL/L (ref 136–145)
WBC # BLD AUTO: 6.8 X10*3/UL (ref 4.4–11.3)

## 2024-06-27 PROCEDURE — 80069 RENAL FUNCTION PANEL: CPT | Performed by: STUDENT IN AN ORGANIZED HEALTH CARE EDUCATION/TRAINING PROGRAM

## 2024-06-27 PROCEDURE — 97530 THERAPEUTIC ACTIVITIES: CPT | Mod: GP

## 2024-06-27 PROCEDURE — 97161 PT EVAL LOW COMPLEX 20 MIN: CPT | Mod: GP

## 2024-06-27 PROCEDURE — 96372 THER/PROPH/DIAG INJ SC/IM: CPT | Performed by: STUDENT IN AN ORGANIZED HEALTH CARE EDUCATION/TRAINING PROGRAM

## 2024-06-27 PROCEDURE — 85027 COMPLETE CBC AUTOMATED: CPT | Performed by: STUDENT IN AN ORGANIZED HEALTH CARE EDUCATION/TRAINING PROGRAM

## 2024-06-27 PROCEDURE — 99231 SBSQ HOSP IP/OBS SF/LOW 25: CPT | Performed by: PHYSICIAN ASSISTANT

## 2024-06-27 PROCEDURE — 7100000011 HC EXTENDED STAY RECOVERY HOURLY - NURSING UNIT

## 2024-06-27 PROCEDURE — 97165 OT EVAL LOW COMPLEX 30 MIN: CPT | Mod: GO

## 2024-06-27 PROCEDURE — 36415 COLL VENOUS BLD VENIPUNCTURE: CPT | Performed by: STUDENT IN AN ORGANIZED HEALTH CARE EDUCATION/TRAINING PROGRAM

## 2024-06-27 PROCEDURE — 2500000004 HC RX 250 GENERAL PHARMACY W/ HCPCS (ALT 636 FOR OP/ED): Performed by: STUDENT IN AN ORGANIZED HEALTH CARE EDUCATION/TRAINING PROGRAM

## 2024-06-27 PROCEDURE — 2500000001 HC RX 250 WO HCPCS SELF ADMINISTERED DRUGS (ALT 637 FOR MEDICARE OP): Performed by: STUDENT IN AN ORGANIZED HEALTH CARE EDUCATION/TRAINING PROGRAM

## 2024-06-27 RX ORDER — ACETAMINOPHEN 325 MG/1
650 TABLET ORAL EVERY 6 HOURS PRN
Qty: 56 TABLET | Refills: 0 | Status: ON HOLD | OUTPATIENT
Start: 2024-06-27 | End: 2024-07-04

## 2024-06-27 RX ORDER — POLYETHYLENE GLYCOL 3350 17 G/17G
17 POWDER, FOR SOLUTION ORAL DAILY
Qty: 7 PACKET | Refills: 0 | Status: ON HOLD | OUTPATIENT
Start: 2024-06-28 | End: 2024-07-05

## 2024-06-27 RX ORDER — OXYCODONE HYDROCHLORIDE 5 MG/1
5 TABLET ORAL EVERY 6 HOURS PRN
Qty: 20 TABLET | Refills: 0 | Status: ON HOLD | OUTPATIENT
Start: 2024-06-27 | End: 2024-07-02

## 2024-06-27 RX ORDER — AMOXICILLIN 250 MG
2 CAPSULE ORAL 2 TIMES DAILY
Qty: 28 TABLET | Refills: 0 | Status: ON HOLD | OUTPATIENT
Start: 2024-06-27 | End: 2024-07-04

## 2024-06-27 RX ORDER — CYCLOBENZAPRINE HCL 5 MG
5 TABLET ORAL 3 TIMES DAILY
Qty: 21 TABLET | Refills: 0 | Status: ON HOLD | OUTPATIENT
Start: 2024-06-27 | End: 2024-07-04

## 2024-06-27 RX ORDER — WARFARIN 7.5 MG/1
TABLET ORAL
Qty: 90 TABLET | Refills: 2 | Status: ON HOLD | OUTPATIENT
Start: 2024-06-27 | End: 2025-06-27

## 2024-06-27 ASSESSMENT — PAIN - FUNCTIONAL ASSESSMENT
PAIN_FUNCTIONAL_ASSESSMENT: 0-10

## 2024-06-27 ASSESSMENT — COGNITIVE AND FUNCTIONAL STATUS - GENERAL
DAILY ACTIVITIY SCORE: 22
PERSONAL GROOMING: A LITTLE
WALKING IN HOSPITAL ROOM: A LITTLE
MOBILITY SCORE: 21
MOBILITY SCORE: 19
TURNING FROM BACK TO SIDE WHILE IN FLAT BAD: A LITTLE
STANDING UP FROM CHAIR USING ARMS: A LITTLE
TOILETING: A LITTLE
DAILY ACTIVITIY SCORE: 19
DRESSING REGULAR LOWER BODY CLOTHING: A LITTLE
DRESSING REGULAR UPPER BODY CLOTHING: A LITTLE
HELP NEEDED FOR BATHING: A LITTLE
CLIMB 3 TO 5 STEPS WITH RAILING: A LITTLE
MOVING TO AND FROM BED TO CHAIR: A LITTLE
CLIMB 3 TO 5 STEPS WITH RAILING: A LITTLE
STANDING UP FROM CHAIR USING ARMS: A LITTLE
WALKING IN HOSPITAL ROOM: A LITTLE
HELP NEEDED FOR BATHING: A LITTLE
DRESSING REGULAR LOWER BODY CLOTHING: A LITTLE

## 2024-06-27 ASSESSMENT — ACTIVITIES OF DAILY LIVING (ADL)
ADL_ASSISTANCE: INDEPENDENT
BATHING_ASSISTANCE: MINIMAL

## 2024-06-27 ASSESSMENT — PAIN SCALES - GENERAL
PAINLEVEL_OUTOF10: 0 - NO PAIN
PAINLEVEL_OUTOF10: 2

## 2024-06-27 NOTE — CARE PLAN
The patient's goals for the shift include      The clinical goals for the shift include Pt will remain free from falls/injury. Pt will continue to increase mobility and report tolerable pain level throughout shift.      Problem: Skin  Goal: Prevent/minimize sheer/friction injuries  Outcome: Progressing  Goal: Promote/optimize nutrition  Outcome: Progressing     Problem: Pain  Goal: Turns in bed with improved pain control throughout the shift  Outcome: Progressing  Goal: Walks with improved pain control throughout the shift  Outcome: Progressing  Goal: Performs ADL's with improved pain control throughout shift  Outcome: Progressing  Goal: Participates in PT with improved pain control throughout the shift  Outcome: Progressing

## 2024-06-27 NOTE — NURSING NOTE
Discharge instructions reviewed with pt. Pt verbalizes understanding of medication administration, side effects, and timing. Wound care/showering instructions reviewed. Pt verbalized understanding of where to  new prescriptions and when provider follow up was. Coumadin to resume Monday July 1st. Pt verbalized understanding. PIV's removed, pt belongings gathered and given to pt. Pt transferred to w/c and was taken to family vehicle via pt transport.  
No

## 2024-06-27 NOTE — PROGRESS NOTES
Occupational Therapy    Evaluation    Patient Name: Louis Rainey  MRN: [id number]  Today's Date: 6/27/2024  Time Calculation  Start Time: 1005  Stop Time: 1026  Time Calculation (min): 21 min        Assessment:  OT Assessment: Pt is 73 year old male admitted for spinal sugery. Pt requiring increased assistance compared to baseline. Pt would benefit from skilled OT services during hospital stay.  Prognosis: Good  Evaluation/Treatment Tolerance: Patient tolerated treatment well  End of Session Communication: Bedside nurse  End of Session Patient Position: Bed, 3 rail up, Alarm off, not on at start of session  OT Assessment Results: Decreased ADL status, Decreased safe judgment during ADL, Decreased endurance, Decreased functional mobility, Decreased IADLs  Prognosis: Good  Evaluation/Treatment Tolerance: Patient tolerated treatment well  Plan:  Treatment Interventions: ADL retraining, Functional transfer training, UE strengthening/ROM, Endurance training, Patient/family training, Compensatory technique education, Equipment evaluation/education  OT Frequency: 3 times per week  OT Discharge Recommendations: Low intensity level of continued care  OT Recommended Transfer Status: Assist of 1  Treatment Interventions: ADL retraining, Functional transfer training, UE strengthening/ROM, Endurance training, Patient/family training, Compensatory technique education, Equipment evaluation/education    Subjective   Current Problem:  1. Lumbar spondylosis          General:  General  Reason for Referral: LBP, L4 compression fx, 6/26 s/p MIS L2-3, L4-3 lami, L4 kyphoplasty  Referred By: Moira  Past Medical History Relevant to Rehab: HTN, CKD, afib, PE (2013, on warfarin), prostate ca (s/p RT  Family/Caregiver Present: No  Prior to Session Communication: Bedside nurse  Patient Position Received: Bed, 3 rail up, Alarm off, not on at start of session (Mobility aide at bedside following ambulating in hallway)  General Comment: Supine  with HOB elevated. Pt pleasant and cooperative to work with therapy.  Precautions:  Medical Precautions: Fall precautions  Post-Surgical Precautions: Spinal precautions  Vital Signs:     Pain:  Pain Assessment  Pain Assessment: 0-10  0-10 (Numeric) Pain Score: 2  Pain Type: Surgical pain  Pain Location: Back    Objective   Cognition:  Overall Cognitive Status: Within Functional Limits  Orientation Level: Oriented X4           Home Living:  Type of Home: House  Lives With: Alone  Home Adaptive Equipment: Cane, Crutches  Home Layout: Multi-level, Full bath main level, Stairs to alternate level with rails (Bedroom on 2nd level)  Home Access: Stairs to enter with rails  Entrance Stairs-Rails: Right  Entrance Stairs-Number of Steps: 5  Bathroom Shower/Tub: Tub/shower unit  Bathroom Toilet: Standard  Bathroom Equipment: None  Prior Function:  Level of [place]: Independent with ADLs and functional transfers, Independent with homemaking with ambulation  ADL Assistance: Independent  Homemaking Assistance: Independent  Ambulatory Assistance: Independent (no AD)  Hand Dominance: Right  Prior Function Comments: (-) falls  IADL History:  IADL Comments: (+) drive  ADL:  Eating Assistance: Independent  Grooming Assistance: Stand by  Bathing Assistance: Minimal (Anticipate)  UE Dressing Assistance: Minimal (Anticipate)  LE Dressing Assistance: Minimal (Anticipate)  LE Dressing Deficit:  (Discussed potential use of reach to assist in LB ADLs to promote independence)  Toileting Assistance with Device: Stand by (Anticipate)  Activity Tolerance:  Endurance: Tolerates 10 - 20 min exercise with multiple rests  Bed Mobility/Transfers: Bed Mobility  Bed Mobility: Yes  Bed Mobility 1  Bed Mobility 1: Supine to sitting, Sitting to supine, Log roll  Level of Assistance 1: Close supervision, Minimal verbal cues    Transfers  Transfer: Yes  Transfer 1  Transfer From 1: Sit to, Stand to  Transfer to 1: Sit, Stand  Technique 1: Sit to stand,  Stand to sit  Transfer Level of Assistance 1: Close supervision, Minimal verbal cues      Functional Mobility:  Functional Mobility  Functional Mobility Performed: Yes  Functional Mobility 1  Surface 1: Level tile  Device 1: No device  Assistance 1: Contact guard, Close supervision  Quality of Functional Mobility 1: Narrow base of support  Comments 1: Functional mobility for household distance with in hospital room with CGA-SBA  Sitting Balance:  Static Sitting Balance  Static Sitting-Balance Support: No upper extremity supported  Static Sitting-Level of Assistance: Close supervision  Dynamic Sitting Balance  Dynamic Sitting-Balance Support: No upper extremity supported  Dynamic Sitting-Comments: Contact guard  Standing Balance:  Static Standing Balance  Static Standing-Balance Support: No upper extremity supported  Static Standing-Level of Assistance: Close supervision  Dynamic Standing Balance  Dynamic Standing-Balance Support: No upper extremity supported  Dynamic Standing-Comments: CGA/SBA   Modalities:     Vision:Vision - Basic Assessment  Current Vision: No visual deficits  Sensation:  Sensation Comment: Numbness in LLE at baseline; improved compared to baseline  Strength:  Strength Comments: BUE grossly 4/5 through functional assessment  Perception:     Coordination:      Hand Function:  Gross Grasp: Functional  Coordination: Functional      Outcome Measures:[facility] Daily Activity  Putting on and taking off regular lower body clothing: A little  Bathing (including washing, rinsing, drying): A little  Putting on and taking off regular upper body clothing: A little  Toileting, which includes using toilet, bedpan or urinal: A little  Taking care of personal grooming such as brushing teeth: A little  Eating Meals: None  Daily Activity - Total Score: 19         and OT Adult Other Outcome Measures  4AT: 4AT-    Education Documentation  Body Mechanics, taught by Christina Ann OT at 6/27/2024  1:23 PM.  Learner:  Patient  Readiness: Acceptance  Method: Explanation  Response: Needs Reinforcement    Precautions, taught by Christina Ann OT at 6/27/2024  1:23 PM.  Learner: Patient  Readiness: Acceptance  Method: Explanation  Response: Needs Reinforcement    ADL Training, taught by Christina Ann OT at 6/27/2024  1:23 PM.  Learner: Patient  Readiness: Acceptance  Method: Explanation  Response: Needs Reinforcement    Education Comments  No comments found.        OP EDUCATION:       Goals:  Encounter Problems       Encounter Problems (Active)       ADLs       Patient will perform UB and LB bathing with modified independent level of assistance. (Progressing)       Start:  06/27/24    Expected End:  07/11/24            Patient with complete lower body dressing with modified independent level of assistance  with PRN adaptive equipment (Progressing)       Start:  06/27/24    Expected End:  07/11/24            Patient will complete daily grooming tasks  with modified independent level of assistance and PRN adaptive equipment. (Progressing)       Start:  06/27/24    Expected End:  07/11/24            Patient will complete toileting including hygiene clothing management/hygiene with modified independent level of assistance. (Progressing)       Start:  06/27/24    Expected End:  07/11/24               BALANCE       Pt will maintain dynamic standing balance during ADL task with modified independent level of assistance in order to demonstrate decreased risk of falling and improved postural control. (Progressing)       Start:  06/27/24    Expected End:  07/11/24               COGNITION/SAFETY       Patient will recall and adhere to spinal precautions during all functional mobility/ADL tasks in order to demonstrate improved understanding and promote healing post op (Progressing)       Start:  06/27/24    Expected End:  07/11/24               TRANSFERS       Patient will perform bed mobility modified independent level of assistance in  order to improve safety and independence with mobility (Progressing)       Start:  06/27/24    Expected End:  07/11/24            Patient will complete functional transfers with least restrictive device with modified independent level of assistance. (Progressing)       Start:  06/27/24    Expected End:  07/11/24

## 2024-06-27 NOTE — DISCHARGE SUMMARY
Discharge Diagnosis  Lumbar spinal stenosis due to adjacent segment disease after fusion procedure    Test Results Pending At Discharge  Pending Labs       No current pending labs.            Hospital Course  Louis Rainey is a 73 y.o. male with a past medical history of HTN, CKD, afib, PE (2013, on Warfarin) and prostate CA (s/p RT) who presented with low back pain and L4 compression  fracture. Patient presented for elective MIS L2-3, L4-3 lami, L4 kyphoplasty. Owen removed POD#1 without complication. PT/OT evaluated patient and recommended low intensity level of continued care. On the day of discharge, the patient was seen and evaluated by the neurosurgery team and deemed suitable for discharge home with referral placed to home health care.  There were no significant events overnight. Vitals were reviewed and within normal limits. Labs were stable at discharge. On day of discharge the patient was tolerating a diet, pain was controlled on PO pain medication, was ambulating well and voiding spontaneously. The patient was given detailed discharge instructions and were scheduled to follow up as an outpatient.     Pertinent Physical Exam At Time of Discharge  Constitutional: A&Ox3, calm and cooperative, NAD.  Eyes: PERRL, clear sclera .  ENMT: Moist mucous membranes, no apparent injuries or lesions.  Head/Neck: Neck supple, no JVD. NC/AT.   Cardiovascular: Normal rate and regular rhythm. 2+ equal pulses of the distal extremities.  Respiratory/Thorax: CTAB, regular respirations on RA. Good symmetric chest expansion.   Gastrointestinal: Abdomen soft, non tender.   Extremities: Follows commands x4. SILT. 5/5 strength throughout.   Neurological: A&Ox3.   Psychological: Appropriate mood and behavior.   Skin: Spinal incision C/D/I.     Home Medications     Medication List      START taking these medications     acetaminophen 325 mg tablet; Commonly known as: Tylenol; Take 2 tablets   (650 mg) by mouth every 6 hours if  needed for mild pain (1 - 3) for up to   7 days.   cyclobenzaprine 5 mg tablet; Commonly known as: Flexeril; Take 1 tablet   (5 mg) by mouth 3 times a day for 7 days.   oxyCODONE 5 mg immediate release tablet; Commonly known as: Roxicodone;   Take 1 tablet (5 mg) by mouth every 6 hours if needed for severe pain (7 -   10) for up to 5 days.   polyethylene glycol 17 gram packet; Commonly known as: Glycolax,   Miralax; Take 17 g by mouth once daily for 7 days.; Start taking on: June 28, 2024   sennosides-docusate sodium 8.6-50 mg tablet; Commonly known as:   Mary Ann-Colace; Take 2 tablets by mouth 2 times a day for 7 days.     CONTINUE taking these medications     amLODIPine 5 mg tablet; Commonly known as: Norvasc   calcium carbonate 500 mg calcium (1,250 mg) tablet; Commonly known as:   Oscal   cholecalciferol 400 unit capsule; Commonly known as: Vitamin D-3   cyanocobalamin 1,000 mcg tablet; Commonly known as: Vitamin B-12   gabapentin 100 mg capsule; Commonly known as: Neurontin; Take 1 capsule   (100 mg) by mouth once daily at bedtime for 7 days, THEN 1 capsule (100   mg) 2 times a day for 7 days, THEN 1 capsule (100 mg) 3 times a day.;   Start taking on: March 26, 2024   multivitamin tablet   warfarin 7.5 mg tablet; Commonly known as: Coumadin; Take as directed.   If you are unsure how to take this medication, talk to your nurse or   doctor.; Original instructions: Take as directed per After Visit Summary.     STOP taking these medications     orphenadrine 100 mg 12 hr tablet; Commonly known as: Norflex       Outpatient Follow-Up  Future Appointments   Date Time Provider Department Center   7/5/2024  [id number] [id number] [facility]   8/6/2024  2:45 PM Goi Atkinson MD [id number] [facility]   8/14/2024  2:30 PM VEDA Torres-CNP [id number] [facility]   12/3/2024  1:00 PM Marla Vela MD [id number] [facility]       Samira Chris PA-C    Total face to face time spent with  patient/family of 30 minutes, with >50% of the time spent discussing plan of care/management, counseling/educating on disease processes, explaining results of diagnostic testing.

## 2024-06-27 NOTE — PROGRESS NOTES
Physical Therapy    Physical Therapy Evaluation & Treatment    Patient Name: Louis Rainey  MRN: [id number]  Today's Date: 6/27/2024   Time Calculation  Start Time: 1125  Stop Time: 1149  Time Calculation (min): 24 min    Assessment/Plan   PT Assessment  PT Assessment Results: Impaired balance, Decreased mobility  Rehab Prognosis: Excellent  Evaluation/Treatment Tolerance: Patient tolerated treatment well  Medical Staff Made Aware: Yes  Strengths: Attitude of self, Premorbid level of function  End of Session Communication: Bedside nurse  End of Session Patient Position: Bed, 3 rail up, Alarm off, not on at start of session   IP OR SWING BED PT PLAN  Inpatient or Swing Bed: Inpatient  PT Plan  PT Plan: Ongoing PT  PT Frequency: Daily  PT Discharge Recommendations: Low intensity level of continued care  PT Recommended Transfer Status: Assist x1, Stand by assist  PT - OK to Discharge: Yes    Subjective     General Visit Information:  General  Reason for Referral: LBP, L4 compression fx, 6/26 s/p MIS L2-3, L4-3 lami, L4 kyphoplasty  Past Medical History Relevant to Rehab: HTN, CKD, afib, PE (2013, on warfarin), prostate ca (s/p RT)  Family/Caregiver Present: No  Prior to Session Communication: Bedside nurse  Patient Position Received: Bed, 3 rail up, Alarm off, not on at start of session  General Comment: Supine.  Pt very pleasant, cooperative and agreeable to PT.  Able to ambulate in hallway and navigate stairs today.  Tolerated well.  Home Living:  Home Living  Type of Home: House  Home Adaptive Equipment: None  Home Layout: Multi-level, Bed/bath upstairs  Home Access: Stairs to enter with rails  Entrance Stairs-Rails: Right  Entrance Stairs-Number of Steps: 5  Prior Level of Function:  Prior Function Per Pt/Caregiver Report  Level of Stanley: Independent with ADLs and functional transfers  Ambulatory Assistance: Independent (community ambulator with no AD)  Precautions:  Precautions  Medical Precautions: Fall  precautions  Post-Surgical Precautions: Spinal precautions    Objective   Pain:  Pain Assessment  Pain Assessment: 0-10  0-10 (Numeric) Pain Score: 2  Cognition:  Cognition  Overall Cognitive Status: Within Functional Limits  Orientation Level: Oriented X4  Safety/Judgement: Within Functional Limits  Impulsive: Within functional limits    General Assessments:    Activity Tolerance  Endurance: Tolerates 10 - 20 min exercise with multiple rests    Sensation  Light Touch: No apparent deficits    Strength  Strength Comments: WFL x4  Strength  Strength Comments: WFL x4    Coordination  Movements are Fluid and Coordinated: Yes    Postural Control  Postural Control:  (kyphotic, pt reports improved from before surgery)    Static Sitting Balance  Static Sitting-Level of Assistance: Distant supervision    Static Standing Balance  Static Standing-Level of Assistance: Close supervision  Functional Assessments:     Bed Mobility  Bed Mobility: Yes  Bed Mobility 1  Bed Mobility 1: Supine to sitting, Sitting to supine, Log roll  Level of Assistance 1: Close supervision, Minimal verbal cues    Transfers  Transfer: Yes  Transfer 1  Transfer From 1: Sit to, Stand to  Transfer to 1: Sit  Transfer Level of Assistance 1: Close supervision, Minimal verbal cues    Ambulation/Gait Training  Ambulation/Gait Training Performed: Yes  Ambulation/Gait Training 1  Surface 1: Level tile, Carpet  Device 1: No device  Assistance 1: Close supervision, Minimal verbal cues  Quality of Gait 1: Wide base of support, Diminished heel strike, Decreased step length, Forward flexed posture  Comments/Distance (ft) 1: 150 feet x2    Stairs  Stairs: Yes  Stairs  Rails 1: Right  Device 1: Railing  Assistance 1: Close supervision  Comment/Number of Steps 1: up/dn 4, performed twice, both feet same step descending, reciprocal ascending (Pt reporting improvement in stair performance compared to pre surgery)    Extremity/Trunk Assessments:    RUE   RUE : Within  Functional Limits  LUE   LUE: Within Functional Limits  RLE   RLE : Within Functional Limits  LLE   LLE : Within Functional Limits  Treatments:    Bed Mobility  Bed Mobility: Yes  Bed Mobility 1  Bed Mobility 1: Supine to sitting, Sitting to supine, Log roll  Level of Assistance 1: Close supervision, Minimal verbal cues    Ambulation/Gait Training  Ambulation/Gait Training Performed: Yes  Ambulation/Gait Training 1  Surface 1: Level tile, Carpet  Device 1: No device  Assistance 1: Close supervision, Minimal verbal cues  Quality of Gait 1: Wide base of support, Diminished heel strike, Decreased step length, Forward flexed posture  Comments/Distance (ft) 1: 150 feet x2  Transfers  Transfer: Yes  Transfer 1  Transfer From 1: Sit to, Stand to  Transfer to 1: Sit  Transfer Level of Assistance 1: Close supervision, Minimal verbal cues    Stairs  Stairs: Yes  Stairs  Rails 1: Right  Device 1: Railing  Assistance 1: Close supervision  Comment/Number of Steps 1: up/dn 4, performed twice, both feet same step descending, reciprocal ascending (Pt reporting improvement in stair performance compared to pre surgery)    Outcome Measures:    [facility] Basic Mobility  Turning from your back to your side while in a flat bed without using bedrails: None  Moving from lying on your back to sitting on the side of a flat bed without using bedrails: A little  Moving to and from bed to chair (including a wheelchair): A little  Standing up from a chair using your arms (e.g. wheelchair or bedside chair): A little  To walk in hospital room: A little  Climbing 3-5 steps with railing: A little  Basic Mobility - Total Score: 19    Encounter Problems       Encounter Problems (Active)       Balance       Tinetti>24 to reflect improvement in balance and decreased falls risk  (Progressing)       Start:  06/27/24    Expected End:  07/11/24               Mobility       Ambulate >150 feet, LRD, Ind  (Progressing)       Start:  06/27/24    Expected End:   07/11/24            Up/dn 12 stairs, Ind (Progressing)       Start:  06/27/24    Expected End:  07/11/24               PT Transfers       Pt able to perform bed mobility Ind from a flat bed without rail using Log Roll  (Progressing)       Start:  06/27/24    Expected End:  07/11/24            sit<>stand, bed<>chair, LRD, Ind  (Progressing)       Start:  06/27/24    Expected End:  07/11/24                   Education Documentation  Precautions, taught by Aron Carpenter, PT at 6/27/2024  1:09 PM.  Learner: Patient  Readiness: Eager  Method: Explanation  Response: Verbalizes Understanding, Demonstrated Understanding    Mobility Training, taught by Aron Carpenter, PT at 6/27/2024  1:09 PM.  Learner: Patient  Readiness: Eager  Method: Explanation  Response: Verbalizes Understanding, Demonstrated Understanding    Education Comments  No comments found.

## 2024-06-27 NOTE — PROGRESS NOTES
Care Transitions Progress Note:  Plan per medical team: s/p spinal stenosis lumbar region.  Team Members Present: NP: MD: JO-ANN: AZUL  Dispo: home with low intensity  Status: inpatient  Payor: Medicare  Barriers:none  Adod: 2 days

## 2024-06-27 NOTE — PROGRESS NOTES
"Louis Rainey is a 73 y.o. male on day 0 of admission presenting with Lumbar spinal stenosis due to adjacent segment disease after fusion procedure.    Subjective     No events overnight. No significant pain with ambulation, pending full PTOT eval today.       Objective     Physical Exam    Awake, Ox3  Fcx4 5/5  SILT  Incision c/d/i    Last Recorded Vitals  Blood pressure 138/80, pulse 66, temperature 36.2 °C (97.2 °F), temperature source Temporal, resp. rate 16, height 1.829 m (6' 0.01\"), weight 97.1 kg (214 lb), SpO2 93%.  Intake/Output last 3 Shifts:  I/O last 3 completed shifts:  In: 1975 (20.3 mL/kg) [I.V.:100 (1 mL/kg); IV Piggyback:1875]  Out: 550 (5.7 mL/kg) [Urine:450 (0.1 mL/kg/hr); Blood:100]  Weight: 97.1 kg     Relevant Results                Assessment/Plan   Principal Problem:    Lumbar spinal stenosis due to adjacent segment disease after fusion procedure  Active Problems:    Lumbar radiculopathy    Spinal stenosis of lumbar region with neurogenic claudication    Compression fracture of fourth lumbar vertebra (Multi)    73M h/o HTN, CKD, afib, PE (2013, on warfarin), prostate ca (s/p RT), p/w LBP, L4 compression fx, 6/26 s/p MIS L2-3, L4-3 lami, L4 kyphoplasty    Extended recovery  Warfarin restart plan  PTOT  SCDs, SQH    Anticipate discharge this afternoon.           Antonio Ponce MD      "

## 2024-06-28 ENCOUNTER — DOCUMENTATION (OUTPATIENT)
Dept: HOME HEALTH SERVICES | Facility: HOME HEALTH | Age: 74
End: 2024-06-28

## 2024-06-28 ENCOUNTER — TELEPHONE (OUTPATIENT)
Dept: CARDIOLOGY | Facility: CLINIC | Age: 74
End: 2024-06-28
Payer: MEDICARE

## 2024-06-28 ENCOUNTER — HOME HEALTH ADMISSION (OUTPATIENT)
Dept: HOME HEALTH SERVICES | Facility: HOME HEALTH | Age: 74
End: 2024-06-28
Payer: MEDICARE

## 2024-06-29 NOTE — OP NOTE
minimally invasive L2-3 and L3-4 laminectomy, partial medial facetectomies bialteral and kyphoplasty for L4 compression fracture (B) Operative Note     Date: 2024  OR Location: [facility] OR    Name: Louis Rainey, : 1950, Age: 73 y.o., MRN: [id number], Sex: male    Diagnosis  Pre-op Diagnosis     * Spinal stenosis of lumbar region with neurogenic claudication [M48.062]     * Compression fracture of L4 vertebra, initial encounter (Multi) [S32.040A] Post-op Diagnosis     * Spinal stenosis of lumbar region with neurogenic claudication [M48.062]     * Compression fracture of L4 vertebra, initial encounter (Multi) [S32.040A]     Procedures  minimally invasive L2-3 and L3-4 laminectomy, partial medial facetectomies bialteral and kyphoplasty for L4 compression fracture  28077 - VT AGUILAR FACETECTOMY & FORAMOTOMY 1 VRT SGM LUMBAR    VT AGUILAR FACETECTOMY&FORAMOT 1 VRT SGM EA ADDL SGM [79037]  VT PERQ VERT AGMNTJ CAVITY CRTJ UNI/BI CANNULJ LMBR [90041]  Surgeons      * Gio Ray - Primary    Resident/Fellow/Other Assistant:  Surgeons and Role:     * Dre Linda MD PhD - Assisting     * Antonio Ponce MD - Resident - Assisting    Procedure Summary  Anesthesia: General  ASA: III  Anesthesia Staff: Anesthesiologist: Kash Ohara MD  CRNA: VEDA Quintero-CRNA  C-AA: YOKO Argueta; YOKO Rosas  ELMA: Emmanuel Viera  Estimated Blood Loss: 100 mL  Intra-op Medications:   Administrations occurring from 0815 to 1215 on 24:   Medication Name Total Dose   lidocaine-epinephrine PF (Xylocaine W/EPI) 1 %-1:200,000 injection 10 mL   iohexol (OMNIPaque) 300 mg iodine/mL solution 30 mL              Anesthesia Record               Intraprocedure I/O Totals          Intake    LR bolus 1800.00 mL    clindamycin (Cleocin) IVPB 600 mg/50 mL D5 (premix) 75.00 mL    Total Intake 1875 mL       Output    Urine 250 mL    Est. Blood Loss 100 mL    Total Output 350 mL       Net    Net Volume 1525 mL           Specimen: No specimens collected     Staff:   Circulator: Herminio Clinton Person: Mishel Yancey Scrub: Joshua Yancey Circulator: Alysha  Circulator: Dea Yancey Scrub: Dea         Drains and/or Catheters:   [REMOVED] Urethral Catheter Coude 14 Fr. (Removed)       [REMOVED] Urethral Catheter Latex 18 Fr. (Removed)   Site Assessment Clean;Skin intact 06/26/24 2121   Collection Container Standard drainage bag 06/26/24 2121   Securement Method Securing device (Describe) 06/26/24 2121   Reason for Continuing Urinary Catheterization surgical procedures: urological/gynecological, pelvic oncology, anal, prolonged surgical procedure 06/26/24 2200   Output (mL) 375 mL 06/27/24 0358       Tourniquet Times:         Implants:  Implants       Type Name Action Serial No.       bone cement 20g fortress-plus radiopaque Implanted               Findings: see procedure details    Indications: Louis Rainey is an 73 y.o. male who is having surgery for Spinal stenosis of lumbar region with neurogenic claudication [M48.062]  Compression fracture of L4 vertebra, initial encounter (Multi) [S32.040A].     The patient was seen in the preoperative area. The risks, benefits, complications, treatment options, non-operative alternatives, expected recovery and outcomes were discussed with the patient. The possibilities of reaction to medication, pulmonary aspiration, injury to surrounding structures, bleeding, recurrent infection, the need for additional procedures, failure to diagnose a condition, and creating a complication requiring transfusion or operation were discussed with the patient. The patient concurred with the proposed plan, giving informed consent.  The site of surgery was properly noted/marked if necessary per policy. The patient has been actively warmed in preoperative area. Preoperative antibiotics have been ordered and given within 1 hours of incision. Venous thrombosis prophylaxis have been ordered including  bilateral sequential compression devices    Procedure Details:   After informed consent was obtained, the patient was brought back to the operating room and general anesthesia induced by the anesthesia team.  The patient was then put in prone position on a Edil table with Kumar frame.  The lower back was widely prepped and draped in the usual sterile fashion.  Using intraoperative fluoroscopy, a incision was planned and marked centered over the right L2, L3, and L4 pedicles.  Incision was made with 10 blade.  Subdermal dissection was done with monopolar cautery.  The fascia was coagulated and cut.  Next the Medtronic Metrix minimally invasive spine system was used.  We first did a L3-4 laminectomy and bilateral partial medial facetectomies.  The a dilator was placed on the right L3 hemilamina and progressive dilation was performed and a 20 x 60 mm tube was placed in this area and fixed to the bed using intraoperative fluoroscopy.  A combination of high-speed matchstick drill, curettes, and Kerrison rongeurs were used to do a L3-4 lumbar laminectomy and bilateral partial medial facetectomies.  There was severely thickened ligamentum flavum and bilateral facet hypertrophy and we had excellent decompression of the spinal canal at this time.  Hemostasis was achieved with Floseal, bone wax, and bipolar cautery. similarly, a L2-3 laminectomy and bilateral partial medial facetectomies were performed with the minimally invasive spine set.  Next, right L4 kyphoplasty was performed.  A Jamshidi needle was used to go into the L4 vertebral body via the right L4 pedicle using AP and lateral fluoroscopy.  The kyphoplasty balloon was inflated until it was getting close to the top endplate up to 300 PSI.  Next the cement was injected into the balloon and to fill this area.  The Jamshidi needle was removed.  Hemostasis was achieved in the soft tissue with bipolar cautery.  The wound was copiously irrigated with antibiotic  saline.  Was then closed in layers with interrupted 0 Vicryl and 2-0 Vicryl sutures.  The skin was closed with a running 4-0 Monocryl subcuticular stitch.  The wound was washed, dried, and dressed with Exofin glue.  The patient was then flipped supine onto his bed and handed over the anesthesia team.  He was extubated and taken to the recovery area in stable condition. Dr. Dre Linda was present and assisted with the kyphoplasty part of the procedure including the inflation of the balloon and injection cement. No qualified resident was available for the kyphoplasty part of the procedure.     Complications:  None; patient tolerated the procedure well.    Disposition: PACU - hemodynamically stable.  Condition: stable       Attending Attestation: I was present and scrubbed for the entire procedure.    Gio Atkinson  Phone Number: [phone]

## 2024-06-30 ENCOUNTER — APPOINTMENT (OUTPATIENT)
Dept: CARDIOLOGY | Facility: HOSPITAL | Age: 74
End: 2024-06-30
Payer: MEDICARE

## 2024-06-30 ENCOUNTER — HOSPITAL ENCOUNTER (OUTPATIENT)
Facility: HOSPITAL | Age: 74
Setting detail: OBSERVATION
Discharge: HOME | End: 2024-07-01
Attending: EMERGENCY MEDICINE | Admitting: INTERNAL MEDICINE
Payer: MEDICARE

## 2024-06-30 ENCOUNTER — APPOINTMENT (OUTPATIENT)
Dept: RADIOLOGY | Facility: HOSPITAL | Age: 74
End: 2024-06-30
Payer: MEDICARE

## 2024-06-30 DIAGNOSIS — R07.89 MUSCULOSKELETAL CHEST PAIN: ICD-10-CM

## 2024-06-30 DIAGNOSIS — I21.4 NSTEMI (NON-ST ELEVATED MYOCARDIAL INFARCTION) (MULTI): Primary | ICD-10-CM

## 2024-06-30 PROBLEM — R79.89 ELEVATED TROPONIN: Status: ACTIVE | Noted: 2024-06-30

## 2024-06-30 LAB
ALBUMIN SERPL BCP-MCNC: 3.5 G/DL (ref 3.4–5)
ALP SERPL-CCNC: 53 U/L (ref 33–136)
ALT SERPL W P-5'-P-CCNC: 23 U/L (ref 10–52)
ANION GAP SERPL CALC-SCNC: 13 MMOL/L (ref 10–20)
APTT PPP: 37 SECONDS (ref 27–38)
AST SERPL W P-5'-P-CCNC: 30 U/L (ref 9–39)
BASOPHILS # BLD AUTO: 0.01 X10*3/UL (ref 0–0.1)
BASOPHILS NFR BLD AUTO: 0.2 %
BILIRUB SERPL-MCNC: 0.9 MG/DL (ref 0–1.2)
BNP SERPL-MCNC: 176 PG/ML (ref 0–99)
BUN SERPL-MCNC: 21 MG/DL (ref 6–23)
CALCIUM SERPL-MCNC: 8.8 MG/DL (ref 8.6–10.3)
CARDIAC TROPONIN I PNL SERPL HS: 37 NG/L (ref 0–20)
CARDIAC TROPONIN I PNL SERPL HS: 39 NG/L (ref 0–20)
CHLORIDE SERPL-SCNC: 101 MMOL/L (ref 98–107)
CO2 SERPL-SCNC: 24 MMOL/L (ref 21–32)
CREAT SERPL-MCNC: 1.51 MG/DL (ref 0.5–1.3)
EGFRCR SERPLBLD CKD-EPI 2021: 48 ML/MIN/1.73M*2
EOSINOPHIL # BLD AUTO: 0.14 X10*3/UL (ref 0–0.4)
EOSINOPHIL NFR BLD AUTO: 2.2 %
ERYTHROCYTE [DISTWIDTH] IN BLOOD BY AUTOMATED COUNT: 15.5 % (ref 11.5–14.5)
GLUCOSE SERPL-MCNC: 86 MG/DL (ref 74–99)
HCT VFR BLD AUTO: 36.9 % (ref 41–52)
HGB BLD-MCNC: 12 G/DL (ref 13.5–17.5)
IMM GRANULOCYTES # BLD AUTO: 0.03 X10*3/UL (ref 0–0.5)
IMM GRANULOCYTES NFR BLD AUTO: 0.5 % (ref 0–0.9)
INR PPP: 1.3 (ref 0.9–1.1)
LYMPHOCYTES # BLD AUTO: 1.43 X10*3/UL (ref 0.8–3)
LYMPHOCYTES NFR BLD AUTO: 22.6 %
MCH RBC QN AUTO: 29.7 PG (ref 26–34)
MCHC RBC AUTO-ENTMCNC: 32.5 G/DL (ref 32–36)
MCV RBC AUTO: 91 FL (ref 80–100)
MONOCYTES # BLD AUTO: 0.65 X10*3/UL (ref 0.05–0.8)
MONOCYTES NFR BLD AUTO: 10.3 %
NEUTROPHILS # BLD AUTO: 4.08 X10*3/UL (ref 1.6–5.5)
NEUTROPHILS NFR BLD AUTO: 64.2 %
NRBC BLD-RTO: 0 /100 WBCS (ref 0–0)
PLATELET # BLD AUTO: 163 X10*3/UL (ref 150–450)
POTASSIUM SERPL-SCNC: 4.1 MMOL/L (ref 3.5–5.3)
PROT SERPL-MCNC: 7.3 G/DL (ref 6.4–8.2)
PROTHROMBIN TIME: 14.4 SECONDS (ref 9.8–12.8)
RBC # BLD AUTO: 4.04 X10*6/UL (ref 4.5–5.9)
SODIUM SERPL-SCNC: 134 MMOL/L (ref 136–145)
WBC # BLD AUTO: 6.3 X10*3/UL (ref 4.4–11.3)

## 2024-06-30 PROCEDURE — 2500000001 HC RX 250 WO HCPCS SELF ADMINISTERED DRUGS (ALT 637 FOR MEDICARE OP)

## 2024-06-30 PROCEDURE — 84484 ASSAY OF TROPONIN QUANT: CPT | Mod: 91

## 2024-06-30 PROCEDURE — 2500000001 HC RX 250 WO HCPCS SELF ADMINISTERED DRUGS (ALT 637 FOR MEDICARE OP): Performed by: HOSPITALIST

## 2024-06-30 PROCEDURE — 83880 ASSAY OF NATRIURETIC PEPTIDE: CPT

## 2024-06-30 PROCEDURE — 99285 EMERGENCY DEPT VISIT HI MDM: CPT

## 2024-06-30 PROCEDURE — G0378 HOSPITAL OBSERVATION PER HR: HCPCS

## 2024-06-30 PROCEDURE — 36415 COLL VENOUS BLD VENIPUNCTURE: CPT

## 2024-06-30 PROCEDURE — 99223 1ST HOSP IP/OBS HIGH 75: CPT | Performed by: PHYSICIAN ASSISTANT

## 2024-06-30 PROCEDURE — 80053 COMPREHEN METABOLIC PANEL: CPT

## 2024-06-30 PROCEDURE — 85610 PROTHROMBIN TIME: CPT

## 2024-06-30 PROCEDURE — 71046 X-RAY EXAM CHEST 2 VIEWS: CPT

## 2024-06-30 PROCEDURE — 71275 CT ANGIOGRAPHY CHEST: CPT

## 2024-06-30 PROCEDURE — 84484 ASSAY OF TROPONIN QUANT: CPT

## 2024-06-30 PROCEDURE — 2550000001 HC RX 255 CONTRASTS: Performed by: EMERGENCY MEDICINE

## 2024-06-30 PROCEDURE — 85025 COMPLETE CBC W/AUTO DIFF WBC: CPT

## 2024-06-30 PROCEDURE — 2500000005 HC RX 250 GENERAL PHARMACY W/O HCPCS: Performed by: PHYSICIAN ASSISTANT

## 2024-06-30 PROCEDURE — 71046 X-RAY EXAM CHEST 2 VIEWS: CPT | Performed by: RADIOLOGY

## 2024-06-30 PROCEDURE — 93005 ELECTROCARDIOGRAM TRACING: CPT

## 2024-06-30 PROCEDURE — 2500000001 HC RX 250 WO HCPCS SELF ADMINISTERED DRUGS (ALT 637 FOR MEDICARE OP): Performed by: PHYSICIAN ASSISTANT

## 2024-06-30 RX ORDER — AMOXICILLIN 250 MG
2 CAPSULE ORAL 2 TIMES DAILY
Status: DISCONTINUED | OUTPATIENT
Start: 2024-06-30 | End: 2024-07-01 | Stop reason: HOSPADM

## 2024-06-30 RX ORDER — ONDANSETRON HYDROCHLORIDE 2 MG/ML
4 INJECTION, SOLUTION INTRAVENOUS EVERY 8 HOURS PRN
Status: DISCONTINUED | OUTPATIENT
Start: 2024-06-30 | End: 2024-07-01 | Stop reason: HOSPADM

## 2024-06-30 RX ORDER — AMLODIPINE BESYLATE 5 MG/1
5 TABLET ORAL DAILY
Status: DISCONTINUED | OUTPATIENT
Start: 2024-07-01 | End: 2024-07-01 | Stop reason: HOSPADM

## 2024-06-30 RX ORDER — OXYCODONE HYDROCHLORIDE 5 MG/1
7.5 TABLET ORAL EVERY 6 HOURS PRN
Status: DISCONTINUED | OUTPATIENT
Start: 2024-06-30 | End: 2024-07-01 | Stop reason: HOSPADM

## 2024-06-30 RX ORDER — PANTOPRAZOLE SODIUM 40 MG/1
40 TABLET, DELAYED RELEASE ORAL
Status: DISCONTINUED | OUTPATIENT
Start: 2024-07-01 | End: 2024-07-01 | Stop reason: HOSPADM

## 2024-06-30 RX ORDER — WARFARIN 2.5 MG/1
3.75 TABLET ORAL
Status: DISCONTINUED | OUTPATIENT
Start: 2024-06-30 | End: 2024-07-01 | Stop reason: HOSPADM

## 2024-06-30 RX ORDER — CALCIUM CARBONATE 500(1250)
1250 TABLET ORAL DAILY
Status: DISCONTINUED | OUTPATIENT
Start: 2024-07-01 | End: 2024-07-01 | Stop reason: HOSPADM

## 2024-06-30 RX ORDER — ACETAMINOPHEN 325 MG/1
975 TABLET ORAL 3 TIMES DAILY
Status: DISCONTINUED | OUTPATIENT
Start: 2024-06-30 | End: 2024-07-01 | Stop reason: HOSPADM

## 2024-06-30 RX ORDER — METHOCARBAMOL 500 MG/1
500 TABLET, FILM COATED ORAL 4 TIMES DAILY
Status: DISCONTINUED | OUTPATIENT
Start: 2024-06-30 | End: 2024-07-01 | Stop reason: HOSPADM

## 2024-06-30 RX ORDER — MULTIVIT-MIN/IRON FUM/FOLIC AC 7.5 MG-4
1 TABLET ORAL DAILY
Status: DISCONTINUED | OUTPATIENT
Start: 2024-07-01 | End: 2024-07-01 | Stop reason: HOSPADM

## 2024-06-30 RX ORDER — OXYCODONE HYDROCHLORIDE 5 MG/1
5 TABLET ORAL EVERY 6 HOURS PRN
Status: DISCONTINUED | OUTPATIENT
Start: 2024-06-30 | End: 2024-07-01 | Stop reason: HOSPADM

## 2024-06-30 RX ORDER — PANTOPRAZOLE SODIUM 40 MG/10ML
40 INJECTION, POWDER, LYOPHILIZED, FOR SOLUTION INTRAVENOUS
Status: DISCONTINUED | OUTPATIENT
Start: 2024-07-01 | End: 2024-07-01 | Stop reason: HOSPADM

## 2024-06-30 RX ORDER — NAPROXEN SODIUM 220 MG/1
324 TABLET, FILM COATED ORAL ONCE
Status: COMPLETED | OUTPATIENT
Start: 2024-06-30 | End: 2024-06-30

## 2024-06-30 RX ORDER — ONDANSETRON 4 MG/1
4 TABLET, ORALLY DISINTEGRATING ORAL EVERY 8 HOURS PRN
Status: DISCONTINUED | OUTPATIENT
Start: 2024-06-30 | End: 2024-07-01 | Stop reason: HOSPADM

## 2024-06-30 RX ORDER — LIDOCAINE 560 MG/1
1 PATCH PERCUTANEOUS; TOPICAL; TRANSDERMAL DAILY
Status: DISCONTINUED | OUTPATIENT
Start: 2024-06-30 | End: 2024-07-01 | Stop reason: HOSPADM

## 2024-06-30 SDOH — SOCIAL STABILITY: SOCIAL INSECURITY: ABUSE: ADULT

## 2024-06-30 SDOH — SOCIAL STABILITY: SOCIAL INSECURITY: DO YOU FEEL ANYONE HAS EXPLOITED OR TAKEN ADVANTAGE OF YOU FINANCIALLY OR OF YOUR PERSONAL PROPERTY?: NO

## 2024-06-30 SDOH — SOCIAL STABILITY: SOCIAL INSECURITY: ARE THERE ANY APPARENT SIGNS OF INJURIES/BEHAVIORS THAT COULD BE RELATED TO ABUSE/NEGLECT?: NO

## 2024-06-30 SDOH — SOCIAL STABILITY: SOCIAL INSECURITY: DO YOU FEEL UNSAFE GOING BACK TO THE PLACE WHERE YOU ARE LIVING?: NO

## 2024-06-30 SDOH — SOCIAL STABILITY: SOCIAL INSECURITY: WERE YOU ABLE TO COMPLETE ALL THE BEHAVIORAL HEALTH SCREENINGS?: YES

## 2024-06-30 SDOH — SOCIAL STABILITY: SOCIAL INSECURITY: HAVE YOU HAD THOUGHTS OF HARMING ANYONE ELSE?: NO

## 2024-06-30 SDOH — SOCIAL STABILITY: SOCIAL INSECURITY: HAS ANYONE EVER THREATENED TO HURT YOUR FAMILY OR YOUR PETS?: NO

## 2024-06-30 SDOH — SOCIAL STABILITY: SOCIAL INSECURITY: ARE YOU OR HAVE YOU BEEN THREATENED OR ABUSED PHYSICALLY, EMOTIONALLY, OR SEXUALLY BY ANYONE?: NO

## 2024-06-30 SDOH — SOCIAL STABILITY: SOCIAL INSECURITY: HAVE YOU HAD ANY THOUGHTS OF HARMING ANYONE ELSE?: NO

## 2024-06-30 SDOH — SOCIAL STABILITY: SOCIAL INSECURITY: DOES ANYONE TRY TO KEEP YOU FROM HAVING/CONTACTING OTHER FRIENDS OR DOING THINGS OUTSIDE YOUR HOME?: NO

## 2024-06-30 ASSESSMENT — COGNITIVE AND FUNCTIONAL STATUS - GENERAL
MOBILITY SCORE: 24
DAILY ACTIVITIY SCORE: 24
PATIENT BASELINE BEDBOUND: NO
MOBILITY SCORE: 24

## 2024-06-30 ASSESSMENT — LIFESTYLE VARIABLES
AUDIT-C TOTAL SCORE: 3
HOW OFTEN DO YOU HAVE A DRINK CONTAINING ALCOHOL: 2-3 TIMES A WEEK
HOW OFTEN DO YOU HAVE 6 OR MORE DRINKS ON ONE OCCASION: NEVER
AUDIT-C TOTAL SCORE: 3
HOW MANY STANDARD DRINKS CONTAINING ALCOHOL DO YOU HAVE ON A TYPICAL DAY: 1 OR 2
SKIP TO QUESTIONS 9-10: 1

## 2024-06-30 ASSESSMENT — HEART SCORE
RISK FACTORS: >2 RISK FACTORS OR HX OF ATHEROSCLEROTIC DISEASE
ECG: NORMAL
AGE: 65+
HEART SCORE: 4
HISTORY: SLIGHTLY SUSPICIOUS
TROPONIN: LESS THAN OR EQUAL TO NORMAL LIMIT

## 2024-06-30 ASSESSMENT — PATIENT HEALTH QUESTIONNAIRE - PHQ9
SUM OF ALL RESPONSES TO PHQ9 QUESTIONS 1 & 2: 0
1. LITTLE INTEREST OR PLEASURE IN DOING THINGS: NOT AT ALL
2. FEELING DOWN, DEPRESSED OR HOPELESS: NOT AT ALL

## 2024-06-30 ASSESSMENT — ACTIVITIES OF DAILY LIVING (ADL)
LACK_OF_TRANSPORTATION: NO
PATIENT'S MEMORY ADEQUATE TO SAFELY COMPLETE DAILY ACTIVITIES?: YES
DRESSING YOURSELF: INDEPENDENT
ASSISTIVE_DEVICE: EYEGLASSES
JUDGMENT_ADEQUATE_SAFELY_COMPLETE_DAILY_ACTIVITIES: YES
BATHING: INDEPENDENT
HEARING - LEFT EAR: FUNCTIONAL
WALKS IN HOME: INDEPENDENT
ADEQUATE_TO_COMPLETE_ADL: YES
HEARING - RIGHT EAR: FUNCTIONAL
GROOMING: INDEPENDENT
FEEDING YOURSELF: INDEPENDENT
TOILETING: INDEPENDENT

## 2024-06-30 ASSESSMENT — PAIN SCALES - GENERAL
PAINLEVEL_OUTOF10: 8
PAINLEVEL_OUTOF10: 6
PAINLEVEL_OUTOF10: 4
PAINLEVEL_OUTOF10: 6
PAINLEVEL_OUTOF10: 7

## 2024-06-30 ASSESSMENT — PAIN DESCRIPTION - ORIENTATION
ORIENTATION: LEFT
ORIENTATION: LEFT

## 2024-06-30 ASSESSMENT — PAIN DESCRIPTION - LOCATION
LOCATION: CHEST
LOCATION: CHEST

## 2024-06-30 ASSESSMENT — PAIN - FUNCTIONAL ASSESSMENT
PAIN_FUNCTIONAL_ASSESSMENT: 0-10
PAIN_FUNCTIONAL_ASSESSMENT: 0-10

## 2024-06-30 ASSESSMENT — PAIN DESCRIPTION - DESCRIPTORS: DESCRIPTORS: SHARP

## 2024-06-30 ASSESSMENT — PAIN DESCRIPTION - PAIN TYPE: TYPE: ACUTE PAIN

## 2024-06-30 NOTE — PROGRESS NOTES
Louis Rainey is a 73 y.o. male admitted for chest pain. Pharmacy has been consulted for warfarin dosing and monitoring for Pulmonary Embolism (PE) with goal INR of 2.0-3.0.     Home regimen   MON TUE WED THR FRI SAT SUN   Dose 7.5  mg 3.75  mg 7.5  mg 3.75  mg 7.5  mg 7.5  mg 3.75  mg   Total weekly dose: 41.25 mg  Source: Med Rec/Outpatient Clinic Note from 6/25    Labs  INR: 1.3  Hgb/Hct/Plt  Lab Results   Component Value Date    HGB 12.0 (L) 06/30/2024    HCT 36.9 (L) 06/30/2024     06/30/2024        Warfarin Therapy   Current regimen: resuming home reigmen. Patient had back surgery on 6/26. Patient was holding warfarin since 6/21. Per surgery, patient is to resume warfarin on 7/1.   Bridging: None needed   Interacting medications: no interacting medications    Dosing History During Current Admission  Date 6/30   INR 1.3   Dose  (mg) Per surgery, holding until 7/1     Assessment and Plan  Patient's INR of 1.3 today is Subtherapeutic. Hemoglobin/hematocrit/platelet stable  Warfarin inpatient plan: Will hold warfarin until 7/1 per surgery.   Monitor s/sx of bleeding including epistaxis, hematuria, unusual bruising, hemoptysis, hematochezia as well as s/sx of stroke including impaired speech, unilateral paralysis, blurry vision.  Pharmacy will continue to monitor the patient and adjust therapy as needed.  Discharge plan   Patient established at [facility] coumadin clinic.  Thank you for the consult. Please do not hesitate to contact a pharmacist with any questions.    Jaylin Michael, PharmD

## 2024-06-30 NOTE — CARE PLAN
The patient's goals for the shift include decrease pain    The clinical goals for the shift include patient will have controlled and tolerable pain levels      Problem: Pain - Adult  Goal: Verbalizes/displays adequate comfort level or baseline comfort level  Outcome: Progressing     Problem: Safety - Adult  Goal: Free from fall injury  Outcome: Progressing     Problem: Discharge Planning  Goal: Discharge to home or other facility with appropriate resources  Outcome: Progressing     Problem: Chronic Conditions and Co-morbidities  Goal: Patient's chronic conditions and co-morbidity symptoms are monitored and maintained or improved  Outcome: Progressing     Problem: Pain  Goal: Takes deep breaths with improved pain control throughout the shift  Outcome: Progressing  Goal: Turns in bed with improved pain control throughout the shift  Outcome: Progressing  Goal: Walks with improved pain control throughout the shift  Outcome: Progressing  Goal: Performs ADL's with improved pain control throughout shift  Outcome: Progressing  Goal: Participates in PT with improved pain control throughout the shift  Outcome: Progressing  Goal: Free from opioid side effects throughout the shift  Outcome: Progressing  Goal: Free from acute confusion related to pain meds throughout the shift  Outcome: Progressing

## 2024-06-30 NOTE — ED TRIAGE NOTES
PT is A/ox4 coming in for chest pain/SOB. Pt had back surgery on Wednesday. PT stated the chest pain is a sharp stabbing feeling in the left side of his chest.  Symptoms started yesterday.

## 2024-06-30 NOTE — ED PROVIDER NOTES
Emergency Department Provider Note        History of Present Illness     History provided by: Patient  Limitations to History: None  External Records Reviewed with Brief Summary: Discharge Summary from 6/27/2024 which showed admission for lumbar laminectomy    HPI:  Louis Rainey is a 73 y.o. male with a past medical history of hypertension, CKD, A-fib, PE on warfarin, prostate cancer who is presenting today with chest pain shortness of breath.  Patient reports that began yesterday.  Describes it as left-sided.  Notes that shortness of breath is associated with it.  Describes it as being intermittent but sharp.  Denies nausea vomiting and diaphoresis. He denies lower extremity edema. He has not taken his warfarin since the surgery and is supposed to resume it tomorrow.     Physical Exam   Triage vitals:  T 36.7 °C (98 °F)  HR 94  /89  RR 17  O2 100 %      Physical Exam  Vitals and nursing note reviewed.   Constitutional:       General: He is not in acute distress.  HENT:      Head: Normocephalic and atraumatic.   Eyes:      Conjunctiva/sclera: Conjunctivae normal.   Cardiovascular:      Rate and Rhythm: Normal rate and regular rhythm.      Pulses:           Radial pulses are 2+ on the right side and 2+ on the left side.      Heart sounds: Normal heart sounds. No murmur heard.  Pulmonary:      Effort: Pulmonary effort is normal. No respiratory distress.      Breath sounds: No decreased breath sounds, wheezing, rhonchi or rales.   Abdominal:      General: There is no distension.      Palpations: Abdomen is soft.   Musculoskeletal:         General: No deformity.      Cervical back: Normal range of motion.      Right lower leg: No edema.      Left lower leg: No edema.   Skin:     General: Skin is warm and dry.   Neurological:      Mental Status: He is alert and oriented to person, place, and time.   Psychiatric:         Mood and Affect: Mood normal.         Behavior: Behavior normal.          Medical  Decision Making & ED Course   Medical Decision Makin y.o. male with a past medical history of hypertension, A-fib, and PE on warfarin who is presenting today  with chest pain shortness of breath.  Patient was having his warfarin on hold because of the recent back surgery.  Differential includes PE versus coronary artery disease versus pneumonia.  Will obtain a chest x-ray as well as a CT PE.  Will obtain lab work including CBC, CMP, troponin and BNP.  Heart score 5.  Patient did have a recent echo in March.  Which showed a diastolic filling defect.  Patient's troponin was elevated to 39 but down trended is a 37.  CT PE study was negative.  Patient was given 324 of aspirin.  The decision was made to hold anticoagulation given recent neurosurgery.  Troponin is not uptrending.  Patient was admitted to medicine for cardiac risk stratification.  ----       Differential diagnoses considered include but are not limited to:  PE versus coronary artery disease versus pneumonia    None identified     EKG Independent Interpretation: EKG interpreted by myself. Please see ED Course for full interpretation.    Independent Result Review and Interpretation: Relevant laboratory and radiographic results were reviewed and independently interpreted by myself.  As necessary, they are commented on in the ED Course.    Chronic conditions affecting the patient's care: As documented above in [facility]    The patient was discussed with the following consultants/services: Hospitalist/Admitting Provider who accepted the patient for admission    Care Considerations: As documented above in [facility]    ED Course:  ED Course as of 24 1431   Sun 2024   1104 EKG was intermittently reviewed and showed sinus rhythm at a rate in V3.  T wave inversions in lead II [SB]   1147 Chest x-ray was independently reviewed and showed questionable pneumonia in the left lower lobe. [SB]   1216 HEMOGLOBIN(!): 12.0 [SB]   1237 Creatinine(!): 1.51  At baseline  [SB]   1241 BNP was 176.  INR is not therapeutic. [SB]   1243 Troponin I, High Sensitivity(!): 39 [SB]      ED Course User Index  [SB] Jeannie Mayo MD         Diagnoses as of 06/30/24 1431   NSTEMI (non-ST elevated myocardial infarction) (Multi)     Disposition   As a result of their workup, the patient will require admission to the hospital.  The patient was informed of his diagnosis.  The patient was given the opportunity to ask questions and I answered them. The patient agreed to be admitted to the hospital.    Procedures   Procedures    Patient seen and discussed with ED attending physician.    Jeannie Mayo MD  Emergency Medicine     Jeannie Mayo MD  Resident  06/30/24 1852

## 2024-06-30 NOTE — H&P
History Of Present Illness  Louis Rainey is a 73 y.o. male presenting with musculoskeletal chest pain, elevated troponin, shortness of breath, hyponatremia.  Patient with history of CKD, dyspnea on exertion, A-fib, hypertension etc presented to the emergency department complaining of chest pain.  Patient indicates the anterior axillary line in the left chest.  Patient states it started yesterday and has been constant but waxes and wanes in intensity.  Patient can range from a 5 to a 9 out of 10.  Patient was short of breath yesterday but not now.  At baseline he has dyspnea with stairs.  Patient states there is some constant pain with intermittent sharper pain that occur at rest but also it is worse when he takes a deep breath or moves his left arm.  Patient had lumbar fusion procedure on June 26 for lumbar stenosis due to adjacent segment disease.  He was discharged on the 27th.  Patient states his back pain is about a 4 out of 10 but is worse with standing or walking.  Had a good bowel movement yesterday x 2 but had been constipated before that.  He lives alone in his home.  He does have numbness and tingling in the left foot but he has had that since before the surgery.  He thinks it might be a little worse now but not significantly.    Past medical history: CKD, GERD, bigeminy, anemia, Forrest's esophagus, dyspnea on exertion, lumbar stenosis with lumbar fusion, hypertension, paroxysmal A-fib, pulmonary embolism, prostate cancer, status post radiation and oral chemo, L4 compression fracture.    Medications: Patient is on warfarin but it has been held since 5 days before his surgery.  He is supposed to restart it tomorrow.  He is on Senokot, oxycodone, Flexeril for since the surgery and he takes amlodipine, Os-Lázaro, multivitamin and other supplements on a regular basis.    Social history: Non-smoker, drinks alcohol couple times a week    Allergies: Cephalexin and cefazolin    ED course:.  EKG showed a sinus  rhythm with PACs, 93 heart rate, slight T wave inversion in lead III and a little flattened T wave in aVF when compared to the previous EKG.  Last echo was 2023 with an EF of 55 to 60%.  Patient's lab work revealed a CMP with a sodium 134, creatinine 1.51, GFR 48 BUN was normal at 21 and the rest of the CMP was within normal limits.  BNP was 176, no pitting edema, no fluid overload on x-ray and CT.  Troponin #1 was 39, troponin #2 was 37  INR was 1.3  CBC showed a white count of 6.3, no left shift.  Mild stable anemia, normal platelets  Chest x-ray showed bibasilar atelectasis or less likely infiltrates  CT chest was negative for PE and showed combination bibasilar atelectasis and scarring.  Patient was given aspirin 324 mg p.o. in the ED    Past Medical History:   Diagnosis Date    Anemia     Arrhythmia     SVT/ PVCs-([facility] Feb-2022), AFIB    Arthritis     Forrest's esophagus     CKD (chronic kidney disease)     stage III    Erectile dysfunction     GERD (gastroesophageal reflux disease)     HTN (hypertension)     Low back pain     Lumbar compression fracture (Multi)     Lumbar spondylosis     PE (pulmonary thromboembolism) (Multi)     on Coumadin    Prostate cancer (Multi)     s/p RT follows with Paul Navarrete, CNP    Spinal stenosis     Syncope      Past Surgical History:   Procedure Laterality Date    COLONOSCOPY      ESOPHAGOGASTRODUODENOSCOPY      OTHER SURGICAL HISTORY  2019    Knee surgery    OTHER SURGICAL HISTORY  2019    Shoulder surgery    OTHER SURGICAL HISTORY  2019    Gallbladder surgery     Social History     Tobacco Use    Smoking status: Former     Current packs/day: 0.00     Types: Cigarettes     Quit date: 2019     Years since quittin.1     Passive exposure: Past    Smokeless tobacco: Never   Vaping Use    Vaping status: Never Used   Substance Use Topics    Alcohol use: Yes     Comment: 10 drinks a month    Drug use: Never        Family History  Family  History   Problem Relation Name Age of Onset    Cancer Mother      Heart attack Father          Allergies  Cefazolin sodium, Cephalexin, and Cephalosporins    Review of Systems  Patient denies , nausea, vomiting, fever, chills, diarrhea,  vision changes, rashes, dysuria,  vertigo, headache, cough or cold symptoms, or any other complaints at this time. A complete review of systems was done, and is as stated in the history of present illness, is otherwise negative or not pertinent to the complaint.    Physical Exam  Physical exam: Vital signs and nurses notes were reviewed.    General: Intermittent moderate distress. Alert and oriented  x 3.     Head: atraumatic and normocephalic    Eyes: Pupils equal round reactive to light, EOMs are intact, conjunctivae is not injected.    Oropharynx: No erythema or exudate noted, no trismus or drooling, buccal mucosa is moist.    Ears:  normal external exam, no swelling or erythema,     Nasal: normal external exam,     Neck: Supple, full range of motion,     Cardiac: Regular rate and rhythm. No murmurs noted.     Chest wall: No erythema or ecchymosis, no increased warmth however he is tender to palpation to the anterior axillary line on the left to the lateral aspect of the left breast.  This pain is increased with deep breath, touch, and raising the left arm.    Pulmonary: Lungs clear bilaterally with good aeration. No adventitious breath sounds. No wheezes rales or rhonchi. No accessory muscle use no retraction noted.    Abdomen: Soft,  Nontender. No guarding, rigidity, or distention. Normoactive bowel sounds. No pulsatile masses, no bruits.     Back: No erythema or edema surrounding his incision, no drainage    Extremities: Range of motion of the lower extremities bilaterally increases his back pain but he can move them independently.  Muscle strength with dorsal and plantarflexion of the feet against resistance is +5 and equal.  Full movement of the upper extremities although  abduction of the left arm does increase his chest wall pain.  No pitting edema.    Skin: No rash seen. Skin is warm and dry     Neuro: Patient is alert and oriented x3. Speech is clear. There is no asymmetry with facial grimaces, and no tongue deviation. Patient moves all extremities independently. Sensation is intact. No obvious neuro deficits are noted.     Last Recorded Vitals  BP (!) 135/103   Pulse 82   Temp 36.7 °C (98 °F)   Resp 18   Wt 98 kg (216 lb)   SpO2 95%     Relevant Results  Scheduled medications  acetaminophen, 975 mg, oral, TID  lidocaine, 1 patch, transdermal, Daily  methocarbamol, 500 mg, oral, 4x daily      Continuous medications     PRN medications  PRN medications: oxyCODONE **OR** oxyCODONE    Results for orders placed or performed during the hospital encounter of 06/30/24 (from the past 24 hour(s))   CBC and Auto Differential   Result Value Ref Range    WBC 6.3 4.4 - 11.3 x10*3/uL    nRBC 0.0 0.0 - 0.0 /100 WBCs    RBC 4.04 (L) 4.50 - 5.90 x10*6/uL    Hemoglobin 12.0 (L) 13.5 - 17.5 g/dL    Hematocrit 36.9 (L) 41.0 - 52.0 %    MCV 91 80 - 100 fL    MCH 29.7 26.0 - 34.0 pg    MCHC 32.5 32.0 - 36.0 g/dL    RDW 15.5 (H) 11.5 - 14.5 %    Platelets 163 150 - 450 x10*3/uL    Neutrophils % 64.2 40.0 - 80.0 %    Immature Granulocytes %, Automated 0.5 0.0 - 0.9 %    Lymphocytes % 22.6 13.0 - 44.0 %    Monocytes % 10.3 2.0 - 10.0 %    Eosinophils % 2.2 0.0 - 6.0 %    Basophils % 0.2 0.0 - 2.0 %    Neutrophils Absolute 4.08 1.60 - 5.50 x10*3/uL    Immature Granulocytes Absolute, Automated 0.03 0.00 - 0.50 x10*3/uL    Lymphocytes Absolute 1.43 0.80 - 3.00 x10*3/uL    Monocytes Absolute 0.65 0.05 - 0.80 x10*3/uL    Eosinophils Absolute 0.14 0.00 - 0.40 x10*3/uL    Basophils Absolute 0.01 0.00 - 0.10 x10*3/uL   Comprehensive metabolic panel   Result Value Ref Range    Glucose 86 74 - 99 mg/dL    Sodium 134 (L) 136 - 145 mmol/L    Potassium 4.1 3.5 - 5.3 mmol/L    Chloride 101 98 - 107 mmol/L     Bicarbonate 24 21 - 32 mmol/L    Anion Gap 13 10 - 20 mmol/L    Urea Nitrogen 21 6 - 23 mg/dL    Creatinine 1.51 (H) 0.50 - 1.30 mg/dL    eGFR 48 (L) >60 mL/min/1.73m*2    Calcium 8.8 8.6 - 10.3 mg/dL    Albumin 3.5 3.4 - 5.0 g/dL    Alkaline Phosphatase 53 33 - 136 U/L    Total Protein 7.3 6.4 - 8.2 g/dL    AST 30 9 - 39 U/L    Bilirubin, Total 0.9 0.0 - 1.2 mg/dL    ALT 23 10 - 52 U/L   B-Type Natriuretic Peptide   Result Value Ref Range     (H) 0 - 99 pg/mL   Coagulation Screen   Result Value Ref Range    Protime 14.4 (H) 9.8 - 12.8 seconds    INR 1.3 (H) 0.9 - 1.1    aPTT 37 27 - 38 seconds   Troponin I, High Sensitivity, Initial   Result Value Ref Range    Troponin I, High Sensitivity 39 (H) 0 - 20 ng/L   Troponin, High Sensitivity, 1 Hour   Result Value Ref Range    Troponin I, High Sensitivity 37 (H) 0 - 20 ng/L     CT angio chest for pulmonary embolism   Final Result   1. No pulmonary embolism.   2. Combination bibasilar atelectasis and scarring.        MACRO:   None        Signed by: Berna Masterson 6/30/2024 1:01 PM   Dictation workstation:   [id number]      XR chest 2 views   Final Result   Bibasilar atelectasis or less likely infiltrates. Clinical   correlation and follow-up recommended        Signed by: Poppy Berkowitz 6/30/2024 12:09 PM   Dictation workstation:   [id number]             Assessment/Plan   Principal Problem:    Musculoskeletal chest pain  Active Problems:    Elevated troponin      Plan: Telemetry  Third troponin pending  Tylenol as needed, oxycodone as needed  Robaxin  Lidocaine patches  Pharmacy to restart the Coumadin tomorrow  Amlodipine Os-Lázaro and multivitamins from his home meds ordered  SCDs  Daily CBC and BMP  PT, OT and social work consult ordered.  Patient may need short-term rehab or home health care as he lives home alone.    Pantoprazole daily, Zofran as needed, Mary Ann-Colace daily  Findings, orders, plan discussed with Dr. Jarrod Fernandes PA-C

## 2024-06-30 NOTE — PROGRESS NOTES
Pharmacy Medication History Review    Per patient. Doesn't need or take the miralax or pericolace. Hasn't really been taking flexeril or other pain meds either. Were given after surgery. Coumadin hasn't had in about a week or so.     Louis Rainey is a 73 y.o. male admitted for NSTEMI (non-ST elevated myocardial infarction) (Multi). Pharmacy reviewed the patient's [id number] medications and allergies for accuracy.    The list below reflectives the updated PTA list. Please review each medication in order reconciliation for additional clarification and justification.       The list below reflectives the updated allergy list. Please review each documented allergy for additional clarification and justification.  Allergies  Reviewed by Blanche Fernandes PA-C on 6/30/2024        Severity Reactions Comments    Cefazolin Sodium Not Specified Unknown     Cephalexin Not Specified Unknown     Cephalosporins Not Specified Unknown states was mild and it was 20 years ago            Below are additional concerns with the patient's PTA list.  Prior to Admission Medications   Prescriptions Last Dose Informant   acetaminophen (Tylenol) 325 mg tablet     Sig: Take 2 tablets (650 mg) by mouth every 6 hours if needed for mild pain (1 - 3) for up to 7 days.   amLODIPine (Norvasc) 5 mg tablet 6/30/2024    Sig: Take 1 tablet (5 mg) by mouth once daily.   calcium carbonate (Oscal) 500 mg calcium (1,250 mg) tablet 6/30/2024    Sig: Take 1 tablet (1,250 mg) by mouth once daily.   cholecalciferol (Vitamin D-3) 10 mcg (400 unit) capsule 6/30/2024    Sig: Take 1 capsule (10 mcg) by mouth once daily.   cyanocobalamin (Vitamin B-12) 1,000 mcg tablet 6/30/2024    Sig: Take 1 tablet (1,000 mcg) by mouth once daily.   cyclobenzaprine (Flexeril) 5 mg tablet     Sig: Take 1 tablet (5 mg) by mouth 3 times a day for 7 days.              multivitamin tablet 6/30/2024    Sig: Take 1 tablet by mouth once daily.   oxyCODONE (Roxicodone) 5 mg  immediate release tablet     Sig: Take 1 tablet (5 mg) by mouth every 6 hours if needed for severe pain (7 - 10) for up to 5 days.                   warfarin (Coumadin) 7.5 mg tablet Past Week    Sig: Take as directed per After Visit Summary.   Patient taking differently: Take 3.375-7.5 mg by mouth once daily. 1/2 tablet Sunday, Tues, thurs.   1 tablet Mon, Weds, Fri , Sat      Facility-Administered Medications: None        Celi Walker

## 2024-07-01 VITALS
HEART RATE: 86 BPM | HEIGHT: 72 IN | DIASTOLIC BLOOD PRESSURE: 90 MMHG | WEIGHT: 216 LBS | RESPIRATION RATE: 18 BRPM | BODY MASS INDEX: 29.26 KG/M2 | OXYGEN SATURATION: 92 % | SYSTOLIC BLOOD PRESSURE: 137 MMHG | TEMPERATURE: 98.2 F

## 2024-07-01 LAB
ANION GAP SERPL CALC-SCNC: 11 MMOL/L (ref 10–20)
BUN SERPL-MCNC: 18 MG/DL (ref 6–23)
CALCIUM SERPL-MCNC: 8.6 MG/DL (ref 8.6–10.3)
CHLORIDE SERPL-SCNC: 102 MMOL/L (ref 98–107)
CO2 SERPL-SCNC: 24 MMOL/L (ref 21–32)
CREAT SERPL-MCNC: 1.45 MG/DL (ref 0.5–1.3)
EGFRCR SERPLBLD CKD-EPI 2021: 50 ML/MIN/1.73M*2
ERYTHROCYTE [DISTWIDTH] IN BLOOD BY AUTOMATED COUNT: 15.3 % (ref 11.5–14.5)
GLUCOSE SERPL-MCNC: 89 MG/DL (ref 74–99)
HCT VFR BLD AUTO: 34.8 % (ref 41–52)
HGB BLD-MCNC: 11.4 G/DL (ref 13.5–17.5)
MCH RBC QN AUTO: 30.2 PG (ref 26–34)
MCHC RBC AUTO-ENTMCNC: 32.8 G/DL (ref 32–36)
MCV RBC AUTO: 92 FL (ref 80–100)
NRBC BLD-RTO: 0 /100 WBCS (ref 0–0)
PLATELET # BLD AUTO: 147 X10*3/UL (ref 150–450)
POTASSIUM SERPL-SCNC: 4.2 MMOL/L (ref 3.5–5.3)
RBC # BLD AUTO: 3.78 X10*6/UL (ref 4.5–5.9)
SODIUM SERPL-SCNC: 133 MMOL/L (ref 136–145)
WBC # BLD AUTO: 5.5 X10*3/UL (ref 4.4–11.3)

## 2024-07-01 PROCEDURE — 99239 HOSP IP/OBS DSCHRG MGMT >30: CPT | Performed by: INTERNAL MEDICINE

## 2024-07-01 PROCEDURE — 2500000001 HC RX 250 WO HCPCS SELF ADMINISTERED DRUGS (ALT 637 FOR MEDICARE OP): Performed by: PHYSICIAN ASSISTANT

## 2024-07-01 PROCEDURE — 96374 THER/PROPH/DIAG INJ IV PUSH: CPT | Mod: 59

## 2024-07-01 PROCEDURE — G0378 HOSPITAL OBSERVATION PER HR: HCPCS

## 2024-07-01 PROCEDURE — 85027 COMPLETE CBC AUTOMATED: CPT | Performed by: PHYSICIAN ASSISTANT

## 2024-07-01 PROCEDURE — 36415 COLL VENOUS BLD VENIPUNCTURE: CPT | Performed by: PHYSICIAN ASSISTANT

## 2024-07-01 PROCEDURE — 2500000005 HC RX 250 GENERAL PHARMACY W/O HCPCS: Performed by: PHYSICIAN ASSISTANT

## 2024-07-01 PROCEDURE — 80048 BASIC METABOLIC PNL TOTAL CA: CPT | Performed by: PHYSICIAN ASSISTANT

## 2024-07-01 PROCEDURE — 2500000001 HC RX 250 WO HCPCS SELF ADMINISTERED DRUGS (ALT 637 FOR MEDICARE OP): Performed by: HOSPITALIST

## 2024-07-01 PROCEDURE — 2500000004 HC RX 250 GENERAL PHARMACY W/ HCPCS (ALT 636 FOR OP/ED): Performed by: INTERNAL MEDICINE

## 2024-07-01 RX ORDER — CAPSAICIN 0.03 G/100G
CREAM TOPICAL 3 TIMES DAILY
Status: DISCONTINUED | OUTPATIENT
Start: 2024-07-01 | End: 2024-07-01 | Stop reason: HOSPADM

## 2024-07-01 RX ORDER — PREDNISONE 20 MG/1
30 TABLET ORAL DAILY
Qty: 6 TABLET | Refills: 0 | Status: ON HOLD | OUTPATIENT
Start: 2024-07-02 | End: 2024-07-06

## 2024-07-01 ASSESSMENT — ACTIVITIES OF DAILY LIVING (ADL): LACK_OF_TRANSPORTATION: NO

## 2024-07-01 ASSESSMENT — PAIN SCALES - WONG BAKER
WONGBAKER_NUMERICALRESPONSE: HURTS LITTLE BIT
WONGBAKER_NUMERICALRESPONSE: HURTS LITTLE BIT

## 2024-07-01 ASSESSMENT — COGNITIVE AND FUNCTIONAL STATUS - GENERAL
CLIMB 3 TO 5 STEPS WITH RAILING: A LITTLE
MOBILITY SCORE: 23
DAILY ACTIVITIY SCORE: 24

## 2024-07-01 ASSESSMENT — PAIN SCALES - GENERAL
PAINLEVEL_OUTOF10: 6
PAINLEVEL_OUTOF10: 6
PAINLEVEL_OUTOF10: 7

## 2024-07-01 ASSESSMENT — PAIN DESCRIPTION - ORIENTATION
ORIENTATION: LEFT;LOWER
ORIENTATION: LEFT

## 2024-07-01 ASSESSMENT — PAIN - FUNCTIONAL ASSESSMENT
PAIN_FUNCTIONAL_ASSESSMENT: 0-10
PAIN_FUNCTIONAL_ASSESSMENT: 0-10

## 2024-07-01 ASSESSMENT — PAIN DESCRIPTION - LOCATION
LOCATION: CHEST
LOCATION: OTHER (COMMENT)

## 2024-07-01 NOTE — NURSING NOTE
Discharge instructions provided using teachback method.  Patient's health-related risk factors discussed with patient.  Patient educated to look for worsening signs and symptoms and educated to seek medical attention if experiencing medical emergency.  Patient aware of needs to follow up with outpatient clinics as scheduled. Home going meds reviewed with patient.  Patient verbalized understanding of disposition and discharge instructions.  All questions answered to patient's satisfaction and within nursing scope of practice.  Vitals stable; IV(s) removed.

## 2024-07-01 NOTE — PROGRESS NOTES
07/01/24 0711   [facility] Disability Status   Are you deaf or do you have serious difficulty hearing? N   Are you blind or do you have serious difficulty seeing, even when wearing glasses? N   Because of a physical, mental, or emotional condition, do you have serious difficulty concentrating, remembering, or making decisions? (5 years old or older) N   Do you have serious difficulty walking or climbing stairs? Y  (back pain)   Do you have serious difficulty dressing or bathing? N   Because of a physical, mental, or emotional condition, do you have serious difficulty doing errands alone such as visiting the doctor? N

## 2024-07-01 NOTE — PROGRESS NOTES
Home alone      07/01/24 0710   Current Planned Discharge Disposition   Current Planned Discharge Disposition Home

## 2024-07-01 NOTE — CARE PLAN
The patient's goals for the shift include decrease pain    The clinical goals for the shift include Patient will remain safe from injury this shift      Problem: Pain - Adult  Goal: Verbalizes/displays adequate comfort level or baseline comfort level  Outcome: Progressing     Problem: Safety - Adult  Goal: Free from fall injury  Outcome: Progressing     Problem: Pain  Goal: Walks with improved pain control throughout the shift  Outcome: Progressing     Problem: Pain  Goal: Takes deep breaths with improved pain control throughout the shift  Outcome: Progressing

## 2024-07-01 NOTE — PROGRESS NOTES
Transitional Care Coordination Progress Note:  Plan per Medical/Surgical team: treatment of CP with ASA, PT/OT evals pending   Status: Observation  Payor source: medicare A/B, [facility]  Discharge disposition: Home alone   Potential Barriers: trops 39, 37  ADOD: 7/2/2024   LIGIA Lewis RN, BSN Transitional Care Coordinator ED# [phone]      07/01/24 0711   Discharge Planning   Living Arrangements Alone   Support Systems Children   Assistance Needed cardio work up   Type of Residence Private residence   Number of Stairs to Enter Residence 5   Number of Stairs Within Residence 14   Do you have animals or pets at home? No   Home or Post Acute Services None   Patient expects to be discharged to: Home alone   Does the patient need discharge transport arranged? No   Financial Resource Strain   How hard is it for you to pay for the very basics like food, housing, medical care, and heating? Not hard   Housing Stability   In the last 12 months, was there a time when you were not able to pay the mortgage or rent on time? N   In the last 12 months, how many places have you lived? 1   In the last 12 months, was there a time when you did not have a steady place to sleep or slept in a shelter (including now)? N   Transportation Needs   In the past 12 months, has lack of transportation kept you from medical appointments or from getting medications? no   In the past 12 months, has lack of transportation kept you from meetings, work, or from getting things needed for daily living? No

## 2024-07-01 NOTE — PROGRESS NOTES
Pharmacy has reviewed the following patient's chart on 07/01/24 for drug interactions, acute disease states, and pertinent labs (CBC, hemoglobin, hematocrit, liver function, albumin) that may affect warfarin therapy.    The following has been identified for the patient:   Patient being discharged today.  Will resume warfarin today (7/1) when he returns home and plans to follow up with  [facility] on 7/5.

## 2024-07-01 NOTE — DISCHARGE SUMMARY
"Admitting Provider: Jennifer Campo MD  Discharge Provider: Elton Coates MD  Primary Care Physician at Discharge: DO Kristel Abdul   Admission Date: 6/30/2024     Discharge Date: 7/1/2024  Current Planned Discharge Disposition: Home    Discharge Diagnoses  Principal Problem:    Musculoskeletal chest pain  Active Problems:    Chronic anticoagulation    Stage 3 chronic kidney disease (Multi)    Paroxysmal atrial fibrillation (Multi)    Elevated troponin    Spinal stenosis of lumbar region with neurogenic claudication      Hospital Course  73 yoM with left chest pain.    Pain is reproducible with palpation, so I suspect costochondritis. Work-up otherwise is unremarkable. Will treat with burst of prednisone and capsaicin cream. Discharged home in stable condition.     Test Results Pending At Discharge  Pending Labs       No current pending labs.            Pertinent Physical Exam At Time of Discharge  /90 (BP Location: Left arm, Patient Position: Lying)   Pulse 86   Temp 36.8 °C (98.2 °F) (Oral)   Resp 18   Ht 1.829 m (6' 0.01\")   Wt 98 kg (216 lb)   SpO2 92%   BMI 29.29 kg/m²   Physical Exam  Cardiovascular:      Rate and Rhythm: Normal rate and regular rhythm.      Heart sounds: Normal heart sounds.   Pulmonary:      Breath sounds: Normal breath sounds.   Abdominal:      General: Bowel sounds are normal.      Palpations: Abdomen is soft.   Musculoskeletal:         General: Normal range of motion.   Neurological:      General: No focal deficit present.      Mental Status: He is alert and oriented to person, place, and time.   Psychiatric:         Mood and Affect: Mood normal.         Home Medications     Medication List      START taking these medications     predniSONE 20 mg tablet; Commonly known as: Deltasone; Take 1.5 tablets   (30 mg) by mouth once daily for 4 days. Do not fill before July 2, 2024.;   Start taking on: July 2, 2024     CHANGE how you take these medications     warfarin " 7.5 mg tablet; Commonly known as: Coumadin; Take as directed.   If you are unsure how to take this medication, talk to your nurse or   doctor.; Original instructions: Take as directed per After Visit Summary.;   What changed: how much to take, how to take this, when to take this,   additional instructions     CONTINUE taking these medications     acetaminophen 325 mg tablet; Commonly known as: Tylenol; Take 2 tablets   (650 mg) by mouth every 6 hours if needed for mild pain (1 - 3) for up to   7 days.   amLODIPine 5 mg tablet; Commonly known as: Norvasc   calcium carbonate 500 mg calcium (1,250 mg) tablet; Commonly known as:   Oscal   cholecalciferol 400 unit capsule; Commonly known as: Vitamin D-3   cyanocobalamin 1,000 mcg tablet; Commonly known as: Vitamin B-12   cyclobenzaprine 5 mg tablet; Commonly known as: Flexeril; Take 1 tablet   (5 mg) by mouth 3 times a day for 7 days.   multivitamin tablet   oxyCODONE 5 mg immediate release tablet; Commonly known as: Roxicodone;   Take 1 tablet (5 mg) by mouth every 6 hours if needed for severe pain (7 -   10) for up to 5 days.   polyethylene glycol 17 gram packet; Commonly known as: Glycolax,   Miralax; Take 17 g by mouth once daily for 7 days.   sennosides-docusate sodium 8.6-50 mg tablet; Commonly known as:   Mary Ann-Colace; Take 2 tablets by mouth 2 times a day for 7 days.     STOP taking these medications     gabapentin 100 mg capsule; Commonly known as: Neurontin       Outpatient Follow-Up  Future Appointments   Date Time Provider Department [place]   7/5/2024  [id number] [id number]   8/6/2024  2:45 PM Gio Atkinson MD [id number] [facility]   8/14/2024  2:30 PM VEDA Torres-CNP [id number]   12/3/2024  1:00 PM Marla Vela MD [id number] [facility]       Elton Coates MD    I spent more than 30 min coordinating this patient's discharge.

## 2024-07-05 ENCOUNTER — ANTICOAGULATION - WARFARIN VISIT (OUTPATIENT)
Dept: CARDIOLOGY | Facility: CLINIC | Age: 74
End: 2024-07-05
Payer: MEDICARE

## 2024-07-05 DIAGNOSIS — I26.99 PULMONARY EMBOLISM, UNSPECIFIED CHRONICITY, UNSPECIFIED PULMONARY EMBOLISM TYPE, UNSPECIFIED WHETHER ACUTE COR PULMONALE PRESENT (MULTI): Primary | ICD-10-CM

## 2024-07-05 LAB
POC INR: 1.2
POC PROTHROMBIN TIME: NORMAL

## 2024-07-05 PROCEDURE — 99211 OFF/OP EST MAY X REQ PHY/QHP: CPT

## 2024-07-05 PROCEDURE — 85610 PROTHROMBIN TIME: CPT | Mod: QW

## 2024-07-05 NOTE — PROGRESS NOTES
Patient identification verified with 2 identifiers.    Location: [facility] - [address] [address] [phone] option #1      Referring Physician: DR. PHAM  Enrollment/ Re-enrollment date: 25   INR Goal: 2.0-3.0  INR monitoring is per [facility] protocol.  Anticoagulation Medication: warfarin  Indication: Pulmonary Embolism (PE)    Subjective   Bleeding signs/symptoms: No    Bruising: No   Major bleeding event: No  Thrombosis signs/symptoms: No  Thromboembolic event: No  Missed doses: Yes  held prior to back surgery   Extra doses: No  Medication changes: No  Dietary changes: No  Change in health: No  Change in activity: No  Alcohol: No  Other concerns: No    Upcoming Procedures:  Does the Patient Have any upcoming procedures that require interruption in anticoagulation therapy? no  Does the patient require bridging? no      Anticoagulation Summary  As of 2024      INR goal:  2.0-3.0   TTR:  49.1% (9 mo)   INR used for dosin.20 (2024)   Weekly warfarin total:  48.75 mg               Assessment/Plan   Sub therapeutic as pt held warfarin prior to back surgery . Patient states he has been back on warfarin since  and denies any missed doses.    1. New dose: Will increase weekly dose    2. Next INR: in 5 days        Education provided to patient during the visit:  Patient instructed to call in interim with questions, concerns and changes.   Patient educated on dietary consistency in vitamin k consumption.   Patient educated on compliance with dosing, follow up appointments, and prescribed plan of care.

## 2024-07-06 ENCOUNTER — HOSPITAL ENCOUNTER (INPATIENT)
Facility: HOSPITAL | Age: 74
LOS: 2 days | Discharge: HOME | DRG: 301 | End: 2024-07-09
Attending: EMERGENCY MEDICINE | Admitting: INTERNAL MEDICINE
Payer: MEDICARE

## 2024-07-06 DIAGNOSIS — I82.431 ACUTE DEEP VEIN THROMBOSIS (DVT) OF POPLITEAL VEIN OF RIGHT LOWER EXTREMITY (MULTI): Primary | ICD-10-CM

## 2024-07-06 LAB
ALBUMIN SERPL BCP-MCNC: 3.9 G/DL (ref 3.4–5)
ALP SERPL-CCNC: 65 U/L (ref 33–136)
ALT SERPL W P-5'-P-CCNC: 21 U/L (ref 10–52)
ANION GAP SERPL CALC-SCNC: 14 MMOL/L (ref 10–20)
APTT PPP: 40 SECONDS (ref 27–38)
AST SERPL W P-5'-P-CCNC: 32 U/L (ref 9–39)
BASOPHILS # BLD AUTO: 0.02 X10*3/UL (ref 0–0.1)
BASOPHILS NFR BLD AUTO: 0.4 %
BILIRUB SERPL-MCNC: 0.4 MG/DL (ref 0–1.2)
BUN SERPL-MCNC: 17 MG/DL (ref 6–23)
CALCIUM SERPL-MCNC: 9.2 MG/DL (ref 8.6–10.6)
CHLORIDE SERPL-SCNC: 102 MMOL/L (ref 98–107)
CO2 SERPL-SCNC: 22 MMOL/L (ref 21–32)
CREAT SERPL-MCNC: 1.38 MG/DL (ref 0.5–1.3)
EGFRCR SERPLBLD CKD-EPI 2021: 53 ML/MIN/1.73M*2
EOSINOPHIL # BLD AUTO: 0.17 X10*3/UL (ref 0–0.4)
EOSINOPHIL NFR BLD AUTO: 3.1 %
ERYTHROCYTE [DISTWIDTH] IN BLOOD BY AUTOMATED COUNT: 14.7 % (ref 11.5–14.5)
GLUCOSE SERPL-MCNC: 91 MG/DL (ref 74–99)
HCT VFR BLD AUTO: 36.7 % (ref 41–52)
HGB BLD-MCNC: 12.4 G/DL (ref 13.5–17.5)
IMM GRANULOCYTES # BLD AUTO: 0.02 X10*3/UL (ref 0–0.5)
IMM GRANULOCYTES NFR BLD AUTO: 0.4 % (ref 0–0.9)
INR PPP: 1.5 (ref 0.9–1.1)
LYMPHOCYTES # BLD AUTO: 1.36 X10*3/UL (ref 0.8–3)
LYMPHOCYTES NFR BLD AUTO: 24.8 %
MCH RBC QN AUTO: 29.5 PG (ref 26–34)
MCHC RBC AUTO-ENTMCNC: 33.8 G/DL (ref 32–36)
MCV RBC AUTO: 87 FL (ref 80–100)
MONOCYTES # BLD AUTO: 0.44 X10*3/UL (ref 0.05–0.8)
MONOCYTES NFR BLD AUTO: 8 %
NEUTROPHILS # BLD AUTO: 3.48 X10*3/UL (ref 1.6–5.5)
NEUTROPHILS NFR BLD AUTO: 63.3 %
NRBC BLD-RTO: 0 /100 WBCS (ref 0–0)
PLATELET # BLD AUTO: 185 X10*3/UL (ref 150–450)
POTASSIUM SERPL-SCNC: 4.6 MMOL/L (ref 3.5–5.3)
PROT SERPL-MCNC: 7.7 G/DL (ref 6.4–8.2)
PROTHROMBIN TIME: 17.4 SECONDS (ref 9.8–12.8)
RBC # BLD AUTO: 4.2 X10*6/UL (ref 4.5–5.9)
SODIUM SERPL-SCNC: 133 MMOL/L (ref 136–145)
WBC # BLD AUTO: 5.5 X10*3/UL (ref 4.4–11.3)

## 2024-07-06 PROCEDURE — 36415 COLL VENOUS BLD VENIPUNCTURE: CPT | Performed by: EMERGENCY MEDICINE

## 2024-07-06 PROCEDURE — 96376 TX/PRO/DX INJ SAME DRUG ADON: CPT

## 2024-07-06 PROCEDURE — 85025 COMPLETE CBC W/AUTO DIFF WBC: CPT | Performed by: EMERGENCY MEDICINE

## 2024-07-06 PROCEDURE — 85610 PROTHROMBIN TIME: CPT | Performed by: EMERGENCY MEDICINE

## 2024-07-06 PROCEDURE — 80053 COMPREHEN METABOLIC PANEL: CPT | Performed by: EMERGENCY MEDICINE

## 2024-07-06 PROCEDURE — 99285 EMERGENCY DEPT VISIT HI MDM: CPT | Performed by: EMERGENCY MEDICINE

## 2024-07-06 PROCEDURE — 99285 EMERGENCY DEPT VISIT HI MDM: CPT | Mod: 25

## 2024-07-06 PROCEDURE — 93010 ELECTROCARDIOGRAM REPORT: CPT | Performed by: EMERGENCY MEDICINE

## 2024-07-06 PROCEDURE — 96374 THER/PROPH/DIAG INJ IV PUSH: CPT

## 2024-07-06 ASSESSMENT — PAIN SCALES - GENERAL: PAINLEVEL_OUTOF10: 4

## 2024-07-06 ASSESSMENT — PAIN DESCRIPTION - ORIENTATION: ORIENTATION: RIGHT

## 2024-07-06 ASSESSMENT — PAIN DESCRIPTION - PAIN TYPE: TYPE: ACUTE PAIN

## 2024-07-06 ASSESSMENT — PAIN - FUNCTIONAL ASSESSMENT: PAIN_FUNCTIONAL_ASSESSMENT: 0-10

## 2024-07-06 ASSESSMENT — PAIN DESCRIPTION - LOCATION: LOCATION: LEG

## 2024-07-06 NOTE — ED TRIAGE NOTES
Patient to ED with R leg pain that started 2 days ago. Concerned for DVT. Had laminectomy on 6/26, had chest pain 2 days later and was worked up with no abnormal findings per patient. Does take daily coumadin. Does has a history of PE.

## 2024-07-07 ENCOUNTER — APPOINTMENT (OUTPATIENT)
Dept: RADIOLOGY | Facility: HOSPITAL | Age: 74
DRG: 301 | End: 2024-07-07
Payer: MEDICARE

## 2024-07-07 ENCOUNTER — CLINICAL SUPPORT (OUTPATIENT)
Dept: EMERGENCY MEDICINE | Facility: HOSPITAL | Age: 74
DRG: 301 | End: 2024-07-07
Payer: MEDICARE

## 2024-07-07 PROBLEM — I82.431 ACUTE DEEP VEIN THROMBOSIS (DVT) OF POPLITEAL VEIN OF RIGHT LOWER EXTREMITY (MULTI): Status: ACTIVE | Noted: 2024-07-07

## 2024-07-07 LAB
ABO GROUP (TYPE) IN BLOOD: NORMAL
ALBUMIN SERPL BCP-MCNC: 2.6 G/DL (ref 3.4–5)
ANION GAP SERPL CALC-SCNC: 11 MMOL/L (ref 10–20)
ANTIBODY SCREEN: NORMAL
APTT PPP: >200 SECONDS (ref 27–38)
ATRIAL RATE: 93 BPM
BASOPHILS # BLD AUTO: 0.02 X10*3/UL (ref 0–0.1)
BASOPHILS NFR BLD AUTO: 0.5 %
BUN SERPL-MCNC: 13 MG/DL (ref 6–23)
CALCIUM SERPL-MCNC: 6.2 MG/DL (ref 8.6–10.6)
CHLORIDE SERPL-SCNC: 109 MMOL/L (ref 98–107)
CO2 SERPL-SCNC: 20 MMOL/L (ref 21–32)
CREAT SERPL-MCNC: 1.07 MG/DL (ref 0.5–1.3)
EGFRCR SERPLBLD CKD-EPI 2021: 72 ML/MIN/1.73M*2
EOSINOPHIL # BLD AUTO: 0.16 X10*3/UL (ref 0–0.4)
EOSINOPHIL NFR BLD AUTO: 3.8 %
ERYTHROCYTE [DISTWIDTH] IN BLOOD BY AUTOMATED COUNT: 14.6 % (ref 11.5–14.5)
GLUCOSE SERPL-MCNC: 186 MG/DL (ref 74–99)
HCT VFR BLD AUTO: 29.4 % (ref 41–52)
HGB BLD-MCNC: 9.7 G/DL (ref 13.5–17.5)
HOLD SPECIMEN: NORMAL
IMM GRANULOCYTES # BLD AUTO: 0.01 X10*3/UL (ref 0–0.5)
IMM GRANULOCYTES NFR BLD AUTO: 0.2 % (ref 0–0.9)
INR PPP: 2.4 (ref 0.9–1.1)
LYMPHOCYTES # BLD AUTO: 1.04 X10*3/UL (ref 0.8–3)
LYMPHOCYTES NFR BLD AUTO: 24.9 %
MAGNESIUM SERPL-MCNC: 1.52 MG/DL (ref 1.6–2.4)
MCH RBC QN AUTO: 28.9 PG (ref 26–34)
MCHC RBC AUTO-ENTMCNC: 33 G/DL (ref 32–36)
MCV RBC AUTO: 88 FL (ref 80–100)
MONOCYTES # BLD AUTO: 0.4 X10*3/UL (ref 0.05–0.8)
MONOCYTES NFR BLD AUTO: 9.6 %
NEUTROPHILS # BLD AUTO: 2.55 X10*3/UL (ref 1.6–5.5)
NEUTROPHILS NFR BLD AUTO: 61 %
NRBC BLD-RTO: 0 /100 WBCS (ref 0–0)
P AXIS: 35 DEGREES
P OFFSET: 188 MS
P ONSET: 124 MS
PHOSPHATE SERPL-MCNC: 2.5 MG/DL (ref 2.5–4.9)
PLATELET # BLD AUTO: 174 X10*3/UL (ref 150–450)
POTASSIUM SERPL-SCNC: 2.9 MMOL/L (ref 3.5–5.3)
PR INTERVAL: 186 MS
PROTHROMBIN TIME: 27.4 SECONDS (ref 9.8–12.8)
Q ONSET: 217 MS
QRS COUNT: 16 BEATS
QRS DURATION: 102 MS
QT INTERVAL: 372 MS
QTC CALCULATION(BAZETT): 462 MS
QTC FREDERICIA: 430 MS
R AXIS: -12 DEGREES
RBC # BLD AUTO: 3.36 X10*6/UL (ref 4.5–5.9)
RH FACTOR (ANTIGEN D): NORMAL
SODIUM SERPL-SCNC: 137 MMOL/L (ref 136–145)
T AXIS: -17 DEGREES
T OFFSET: 403 MS
UFH PPP CHRO-ACNC: 0.3 IU/ML
UFH PPP CHRO-ACNC: 0.4 IU/ML
UFH PPP CHRO-ACNC: 1.9 IU/ML
VENTRICULAR RATE: 93 BPM
WBC # BLD AUTO: 4.2 X10*3/UL (ref 4.4–11.3)

## 2024-07-07 PROCEDURE — 2500000002 HC RX 250 W HCPCS SELF ADMINISTERED DRUGS (ALT 637 FOR MEDICARE OP, ALT 636 FOR OP/ED): Performed by: INTERNAL MEDICINE

## 2024-07-07 PROCEDURE — 36415 COLL VENOUS BLD VENIPUNCTURE: CPT | Performed by: INTERNAL MEDICINE

## 2024-07-07 PROCEDURE — 80069 RENAL FUNCTION PANEL: CPT

## 2024-07-07 PROCEDURE — 93005 ELECTROCARDIOGRAM TRACING: CPT

## 2024-07-07 PROCEDURE — 1100000001 HC PRIVATE ROOM DAILY

## 2024-07-07 PROCEDURE — 85520 HEPARIN ASSAY: CPT | Performed by: STUDENT IN AN ORGANIZED HEALTH CARE EDUCATION/TRAINING PROGRAM

## 2024-07-07 PROCEDURE — 85025 COMPLETE CBC W/AUTO DIFF WBC: CPT

## 2024-07-07 PROCEDURE — 93971 EXTREMITY STUDY: CPT

## 2024-07-07 PROCEDURE — 83735 ASSAY OF MAGNESIUM: CPT

## 2024-07-07 PROCEDURE — 93971 EXTREMITY STUDY: CPT | Performed by: RADIOLOGY

## 2024-07-07 PROCEDURE — 36415 COLL VENOUS BLD VENIPUNCTURE: CPT

## 2024-07-07 PROCEDURE — 2500000001 HC RX 250 WO HCPCS SELF ADMINISTERED DRUGS (ALT 637 FOR MEDICARE OP)

## 2024-07-07 PROCEDURE — 2500000004 HC RX 250 GENERAL PHARMACY W/ HCPCS (ALT 636 FOR OP/ED): Performed by: STUDENT IN AN ORGANIZED HEALTH CARE EDUCATION/TRAINING PROGRAM

## 2024-07-07 PROCEDURE — 86850 RBC ANTIBODY SCREEN: CPT

## 2024-07-07 PROCEDURE — 85520 HEPARIN ASSAY: CPT | Performed by: INTERNAL MEDICINE

## 2024-07-07 PROCEDURE — 85610 PROTHROMBIN TIME: CPT

## 2024-07-07 RX ORDER — WARFARIN 7.5 MG/1
7.5 TABLET ORAL DAILY
Status: DISCONTINUED | OUTPATIENT
Start: 2024-07-07 | End: 2024-07-08

## 2024-07-07 RX ORDER — MULTIVIT-MIN/IRON FUM/FOLIC AC 7.5 MG-4
1 TABLET ORAL DAILY
Status: DISCONTINUED | OUTPATIENT
Start: 2024-07-07 | End: 2024-07-09 | Stop reason: HOSPADM

## 2024-07-07 RX ORDER — AMLODIPINE BESYLATE 5 MG/1
5 TABLET ORAL DAILY
Status: DISCONTINUED | OUTPATIENT
Start: 2024-07-07 | End: 2024-07-09 | Stop reason: HOSPADM

## 2024-07-07 RX ORDER — HEPARIN SODIUM 10000 [USP'U]/100ML
0-4500 INJECTION, SOLUTION INTRAVENOUS CONTINUOUS
Status: DISCONTINUED | OUTPATIENT
Start: 2024-07-07 | End: 2024-07-09

## 2024-07-07 SDOH — SOCIAL STABILITY: SOCIAL INSECURITY: HAS ANYONE EVER THREATENED TO HURT YOUR FAMILY OR YOUR PETS?: NO

## 2024-07-07 SDOH — ECONOMIC STABILITY: HOUSING INSECURITY
IN THE LAST 12 MONTHS, WAS THERE A TIME WHEN YOU DID NOT HAVE A STEADY PLACE TO SLEEP OR SLEPT IN A SHELTER (INCLUDING NOW)?: NO

## 2024-07-07 SDOH — ECONOMIC STABILITY: HOUSING INSECURITY: IN THE LAST 12 MONTHS, HOW MANY PLACES HAVE YOU LIVED?: 1

## 2024-07-07 SDOH — SOCIAL STABILITY: SOCIAL INSECURITY: ARE THERE ANY APPARENT SIGNS OF INJURIES/BEHAVIORS THAT COULD BE RELATED TO ABUSE/NEGLECT?: NO

## 2024-07-07 SDOH — SOCIAL STABILITY: SOCIAL INSECURITY: ABUSE: ADULT

## 2024-07-07 SDOH — ECONOMIC STABILITY: INCOME INSECURITY: IN THE LAST 12 MONTHS, WAS THERE A TIME WHEN YOU WERE NOT ABLE TO PAY THE MORTGAGE OR RENT ON TIME?: NO

## 2024-07-07 SDOH — SOCIAL STABILITY: SOCIAL INSECURITY: DO YOU FEEL ANYONE HAS EXPLOITED OR TAKEN ADVANTAGE OF YOU FINANCIALLY OR OF YOUR PERSONAL PROPERTY?: NO

## 2024-07-07 SDOH — ECONOMIC STABILITY: INCOME INSECURITY: HOW HARD IS IT FOR YOU TO PAY FOR THE VERY BASICS LIKE FOOD, HOUSING, MEDICAL CARE, AND HEATING?: NOT HARD AT ALL

## 2024-07-07 SDOH — SOCIAL STABILITY: SOCIAL INSECURITY: ARE YOU OR HAVE YOU BEEN THREATENED OR ABUSED PHYSICALLY, EMOTIONALLY, OR SEXUALLY BY ANYONE?: NO

## 2024-07-07 SDOH — SOCIAL STABILITY: SOCIAL INSECURITY: HAVE YOU HAD ANY THOUGHTS OF HARMING ANYONE ELSE?: NO

## 2024-07-07 SDOH — SOCIAL STABILITY: SOCIAL INSECURITY: DOES ANYONE TRY TO KEEP YOU FROM HAVING/CONTACTING OTHER FRIENDS OR DOING THINGS OUTSIDE YOUR HOME?: NO

## 2024-07-07 SDOH — SOCIAL STABILITY: SOCIAL INSECURITY: HAVE YOU HAD THOUGHTS OF HARMING ANYONE ELSE?: NO

## 2024-07-07 SDOH — ECONOMIC STABILITY: TRANSPORTATION INSECURITY
IN THE PAST 12 MONTHS, HAS LACK OF TRANSPORTATION KEPT YOU FROM MEETINGS, WORK, OR FROM GETTING THINGS NEEDED FOR DAILY LIVING?: NO

## 2024-07-07 SDOH — ECONOMIC STABILITY: TRANSPORTATION INSECURITY
IN THE PAST 12 MONTHS, HAS THE LACK OF TRANSPORTATION KEPT YOU FROM MEDICAL APPOINTMENTS OR FROM GETTING MEDICATIONS?: NO

## 2024-07-07 SDOH — SOCIAL STABILITY: SOCIAL INSECURITY: DO YOU FEEL UNSAFE GOING BACK TO THE PLACE WHERE YOU ARE LIVING?: NO

## 2024-07-07 ASSESSMENT — PATIENT HEALTH QUESTIONNAIRE - PHQ9
1. LITTLE INTEREST OR PLEASURE IN DOING THINGS: NOT AT ALL
SUM OF ALL RESPONSES TO PHQ9 QUESTIONS 1 & 2: 0
2. FEELING DOWN, DEPRESSED OR HOPELESS: NOT AT ALL

## 2024-07-07 ASSESSMENT — LIFESTYLE VARIABLES
HAS A RELATIVE, FRIEND, DOCTOR, OR ANOTHER HEALTH PROFESSIONAL EXPRESSED CONCERN ABOUT YOUR DRINKING OR SUGGESTED YOU CUT DOWN: NO
HOW OFTEN DO YOU HAVE 6 OR MORE DRINKS ON ONE OCCASION: NEVER
AUDIT TOTAL SCORE: 3
EVER HAD A DRINK FIRST THING IN THE MORNING TO STEADY YOUR NERVES TO GET RID OF A HANGOVER: NO
TOTAL SCORE: 0
SUBSTANCE_ABUSE_PAST_12_MONTHS: NO
SKIP TO QUESTIONS 9-10: 1
HAVE YOU OR SOMEONE ELSE BEEN INJURED AS A RESULT OF YOUR DRINKING: NO
HOW OFTEN DO YOU HAVE A DRINK CONTAINING ALCOHOL: 2-3 TIMES A WEEK
HOW OFTEN DURING THE LAST YEAR HAVE YOU HAD A FEELING OF GUILT OR REMORSE AFTER DRINKING: NEVER
HOW OFTEN DURING THE LAST YEAR HAVE YOU NEEDED AN ALCOHOLIC DRINK FIRST THING IN THE MORNING TO GET YOURSELF GOING AFTER A NIGHT OF HEAVY DRINKING: NEVER
HOW OFTEN DURING THE LAST YEAR HAVE YOU BEEN UNABLE TO REMEMBER WHAT HAPPENED THE NIGHT BEFORE BECAUSE YOU HAD BEEN DRINKING: NEVER
HAVE YOU EVER FELT YOU SHOULD CUT DOWN ON YOUR DRINKING: NO
AUDIT TOTAL SCORE: 0
EVER FELT BAD OR GUILTY ABOUT YOUR DRINKING: NO
HOW OFTEN DURING THE LAST YEAR HAVE YOU FOUND THAT YOU WERE NOT ABLE TO STOP DRINKING ONCE YOU HAD STARTED: NEVER
HAVE PEOPLE ANNOYED YOU BY CRITICIZING YOUR DRINKING: NO
HOW MANY STANDARD DRINKS CONTAINING ALCOHOL DO YOU HAVE ON A TYPICAL DAY: 1 OR 2
AUDIT-C TOTAL SCORE: 3
AUDIT-C TOTAL SCORE: 3
HOW OFTEN DURING THE LAST YEAR HAVE YOU FAILED TO DO WHAT WAS NORMALLY EXPECTED FROM YOU BECAUSE OF DRINKING: NEVER
PRESCIPTION_ABUSE_PAST_12_MONTHS: NO

## 2024-07-07 ASSESSMENT — COGNITIVE AND FUNCTIONAL STATUS - GENERAL
PATIENT BASELINE BEDBOUND: NO
MOBILITY SCORE: 24
DAILY ACTIVITIY SCORE: 24

## 2024-07-07 ASSESSMENT — PAIN SCALES - GENERAL
PAINLEVEL_OUTOF10: 0 - NO PAIN

## 2024-07-07 ASSESSMENT — ACTIVITIES OF DAILY LIVING (ADL)
FEEDING YOURSELF: INDEPENDENT
GROOMING: INDEPENDENT
ASSISTIVE_DEVICE: EYEGLASSES
ADEQUATE_TO_COMPLETE_ADL: YES
DRESSING YOURSELF: INDEPENDENT
JUDGMENT_ADEQUATE_SAFELY_COMPLETE_DAILY_ACTIVITIES: YES
PATIENT'S MEMORY ADEQUATE TO SAFELY COMPLETE DAILY ACTIVITIES?: YES
WALKS IN HOME: INDEPENDENT
HEARING - LEFT EAR: FUNCTIONAL
HEARING - RIGHT EAR: FUNCTIONAL
BATHING: INDEPENDENT
TOILETING: INDEPENDENT

## 2024-07-07 NOTE — H&P
History Of Present Illness    Mr. Rainey is a 73 year old Male with a PMHx of HTN, CKD, afib, unprovoked DVT/PE (2013, on Warfarin) and prostate CA (s/p chemo & RT) being admitted for new RLE DVT.    Two days prior to admission, patient states that he developed RLE pain below the calf. The pain ascended to the back of the knee, and later migrated to the soft tissue to the right of his distal tibia. States that he noticed associated warmth and swelling of the same soft tissue. Denies pain of RLE at rest, however experiences dull pain when provoked by ankle inversion and eversion. Denies any trauma to RLE.    Of note, patient presented to [facility] ED on 6/30 complaining of L-sided CP and SOB of one day duration. Troponin 39 -> 37. CT PE was negative. Was admitted, and CP determined to be costochondritis. Was discharged on 7/1 back on warfarin with follow up at  [facility] on 7/5, plan was to increase weekly dose and recheck INR on 7/10.    Denies CP, SOB, abdominal pain. Endorses constipation. No additional symptoms.    Brief ED Course:  Vitals: /93, HR 80, RR 16, O2sat 96% on RA, T 36.1 C  CBC: 5.5 > 12.4 / 36.7 < 185  RFP: 133 / 4.6 / 102 / 22 / 17 / Cr 1.38 < BG 91  Coags: INR 1.5 / PT 17.4    RLE Venous Duplex:  Nearly occlusive thrombus involving the right popliteal extending  into the partially visualized posterior tibial and peroneal veins.  The remainder of the visualized right lower extremity veins are  patent.    Interventions: Heparin gtt (HI)     Past Medical History  Past Medical History:   Diagnosis Date    Anemia     Arrhythmia     SVT/ PVCs-([facility] Feb-March2022), AFIB    Arthritis     Forrest's esophagus     CKD (chronic kidney disease)     stage III    Erectile dysfunction     GERD (gastroesophageal reflux disease)     HTN (hypertension)     Low back pain     Lumbar compression fracture (Multi)     Lumbar spondylosis     PE (pulmonary thromboembolism) (Multi) 2013    on Coumadin     Prostate cancer (Multi)     s/p RT follows with Paul Navarrete, ADAM    Spinal stenosis     Syncope        Surgical History  Past Surgical History:   Procedure Laterality Date    COLONOSCOPY      ESOPHAGOGASTRODUODENOSCOPY      OTHER SURGICAL HISTORY  04/23/2019    Knee surgery    OTHER SURGICAL HISTORY  04/23/2019    Shoulder surgery    OTHER SURGICAL HISTORY  04/23/2019    Gallbladder surgery        Social History  He reports that he quit smoking about 5 years ago. His smoking use included cigarettes. He has been exposed to tobacco smoke. He has never used smokeless tobacco. He reports current alcohol use. He reports that he does not use drugs.    Family History  Family History   Problem Relation Name Age of Onset    Cancer Mother      Heart attack Father          Allergies  Cefazolin sodium, Cephalexin, and Cephalosporins    Review of Systems RLE pain with inversion and eversion of ankle. Denies CP and SOB.     Physical Exam  GEN: NAD  HEENT: AT/NC, PERRL, EOMI  CARDIO: RRR, normal S1 and S2  PULM: CTAB, no WRR, no increased WOB  ABD: Soft, NT/ND, bowel sounds +  : No rollins  EXTR: No evidence of swelling, warmth, or redness of RLE. Tenderness to palpation lateral to tibia (superior to ankle). No pain on calf squeeze  NEURO: No FND  PSYCH: Appropriate mood/affect     Last Recorded Vitals  Blood pressure 118/79, pulse 98, temperature 36.1 °C (96.9 °F), temperature source Temporal, resp. rate 16, height 1.829 m (6'), weight 93.9 kg (207 lb), SpO2 98%.    Relevant Results  Results for orders placed or performed during the hospital encounter of 07/06/24 (from the past 24 hour(s))   CBC and Auto Differential   Result Value Ref Range    WBC 5.5 4.4 - 11.3 x10*3/uL    nRBC 0.0 0.0 - 0.0 /100 WBCs    RBC 4.20 (L) 4.50 - 5.90 x10*6/uL    Hemoglobin 12.4 (L) 13.5 - 17.5 g/dL    Hematocrit 36.7 (L) 41.0 - 52.0 %    MCV 87 80 - 100 fL    MCH 29.5 26.0 - 34.0 pg    MCHC 33.8 32.0 - 36.0 g/dL    RDW 14.7 (H) 11.5 - 14.5 %     Platelets 185 150 - 450 x10*3/uL    Neutrophils % 63.3 40.0 - 80.0 %    Immature Granulocytes %, Automated 0.4 0.0 - 0.9 %    Lymphocytes % 24.8 13.0 - 44.0 %    Monocytes % 8.0 2.0 - 10.0 %    Eosinophils % 3.1 0.0 - 6.0 %    Basophils % 0.4 0.0 - 2.0 %    Neutrophils Absolute 3.48 1.60 - 5.50 x10*3/uL    Immature Granulocytes Absolute, Automated 0.02 0.00 - 0.50 x10*3/uL    Lymphocytes Absolute 1.36 0.80 - 3.00 x10*3/uL    Monocytes Absolute 0.44 0.05 - 0.80 x10*3/uL    Eosinophils Absolute 0.17 0.00 - 0.40 x10*3/uL    Basophils Absolute 0.02 0.00 - 0.10 x10*3/uL   Comprehensive metabolic panel   Result Value Ref Range    Glucose 91 74 - 99 mg/dL    Sodium 133 (L) 136 - 145 mmol/L    Potassium 4.6 3.5 - 5.3 mmol/L    Chloride 102 98 - 107 mmol/L    Bicarbonate 22 21 - 32 mmol/L    Anion Gap 14 10 - 20 mmol/L    Urea Nitrogen 17 6 - 23 mg/dL    Creatinine 1.38 (H) 0.50 - 1.30 mg/dL    eGFR 53 (L) >60 mL/min/1.73m*2    Calcium 9.2 8.6 - 10.6 mg/dL    Albumin 3.9 3.4 - 5.0 g/dL    Alkaline Phosphatase 65 33 - 136 U/L    Total Protein 7.7 6.4 - 8.2 g/dL    AST 32 9 - 39 U/L    Bilirubin, Total 0.4 0.0 - 1.2 mg/dL    ALT 21 10 - 52 U/L   Coagulation Screen   Result Value Ref Range    Protime 17.4 (H) 9.8 - 12.8 seconds    INR 1.5 (H) 0.9 - 1.1    aPTT 40 (H) 27 - 38 seconds     Vascular US Lower Extremity Venous Duplex Right    Result Date: 7/7/2024  STUDY: [facility] US LOWER EXTREMITY VENOUS DUPLEX RIGHT;  7/7/2024 3:13 am   INDICATION: Signs/Symptoms:RLE swelling.   COMPARISON: Venous duplex ultrasound 05/18/2021   ACCESSION NUMBER(S): [id number]   ORDERING CLINICIAN: ALEKSANDAR TOURE   TECHNIQUE: Vascular ultrasound of the right lower extremity was performed. Real-time compression views as well as Gray scale, color Doppler and spectral Doppler waveform analysis was performed.   FINDINGS: Evaluation of the visualized portions of the right common femoral, proximal, mid, and distal femoral, and popliteal veins was performed.  Evaluation of the visualized portions of the posterior tibial and peroneal veins was also performed.   Limitations: None.   There is noncompressibility with intraluminal echogenic material and severely reduced, but preserved flow within the popliteal and partially visualized posterior tibial and peroneal veins.   The remainder of the evaluated veins demonstrate normal compressibility. There is intact venous flow demonstrating normal respiratory variability and normal augmentation of flow with calf compression.   The contralateral common femoral vein is patent.       Nearly occlusive thrombus involving the right popliteal extending into the partially visualized posterior tibial and peroneal veins. The remainder of the visualized right lower extremity veins are patent.   I personally reviewed the images/study and I agree with the findings as stated by Chandrakant Boone MD. This study was interpreted at [facility], [place], [place].   MACRO: Chandrakant Boone discussed the significance and urgency of this critical finding by telephone with  Carter Berna on 7/7/2024 at 3:33 am.  (**-RCF-**) Findings:  See findings.       Dictation workstation:   [id number]     Scheduled medications  amLODIPine, 5 mg, oral, Daily  multivitamin with minerals, 1 tablet, oral, Daily      Continuous medications  heparin, 0-4,500 Units/hr, Last Rate: 1,700 Units/hr (07/07/24 0422)      PRN medications  PRN medications: heparin           Assessment/Plan   Principal Problem:    Acute deep vein thrombosis (DVT) of popliteal vein of right lower extremity (Multi)    ASSESSMENT:    Mr. Rainey is a 73 year old Male with a PMHx of HTN, CKD, afib, unprovoked DVT/PE (2013, on Warfarin) and prostate CA (s/p chemo & RT) being admitted for new RLE DVT. New DVT (confirmed on RLE venous duplex u/s) likely occurred during the planned hold of warfarin for surgery on 6/26 (warfarin held 6/21-7/1). Subtherapeutic INR  1.3 -> 1.5 between 6/30 and 7/6. CT angio 6/30 negative for PE, low suspicion for PE at this time as patient is not SOB and has stable vital signs.    PLAN:    # New Nearly Occlusive RLE DVT  # Hx Unprovoked PE (2013)  -Continue heparin gtt (HI)  -Consider Vascular Medicine c/s in AM (follows with Dr. Vela)  -Consider transition to DOAC if recommended / financially feasible (previously this has not been possible)    # HTN  -Continue home amlodipine 5mg    Code Status: Full Code  NOK: Milagro Rainey (daughter) - [phone]              Gil Brothers MD

## 2024-07-07 NOTE — PROGRESS NOTES
Pharmacy Medication History Review    Louis Rainey is a 73 y.o. male admitted for Acute deep vein thrombosis (DVT) of popliteal vein of right lower extremity (Multi). Pharmacy reviewed the patient's [id number] medications and allergies for accuracy.    The list below reflects the updated PTA list. Comments regarding how patient may be taking medications differently can be found in the Admit Orders Activity  Prior to Admission Medications   Prescriptions Last Dose Informant Taking?   acetaminophen (Tylenol) 325 mg tablet   No   Sig: Take 2 tablets (650 mg) by mouth every 6 hours if needed for mild pain (1 - 3) for up to 7 days.   amLODIPine (Norvasc) 5 mg tablet 7/6/2024 Self Yes   Sig: Take 1 tablet (5 mg) by mouth once daily.   calcium carbonate (Oscal) 500 mg calcium (1,250 mg) tablet 7/6/2024 Self Yes   Sig: Take 1 tablet (1,250 mg) by mouth once daily.   cholecalciferol (Vitamin D-3) 10 mcg (400 unit) capsule 7/6/2024 Self Yes   Sig: Take 1 capsule (10 mcg) by mouth once daily.   cyanocobalamin (Vitamin B-12) 1,000 mcg tablet 7/6/2024 Self Yes   Sig: Take 1 tablet (1,000 mcg) by mouth once daily.   cyclobenzaprine (Flexeril) 5 mg tablet Not Taking  No   Sig: Take 1 tablet (5 mg) by mouth 3 times a day for 7 days.   Patient not taking: Reported on 7/7/2024   multivitamin tablet 7/6/2024 Self Yes   Sig: Take 1 tablet by mouth once daily.   oxyCODONE (Roxicodone) 5 mg immediate release tablet Not Taking  No   Sig: Take 1 tablet (5 mg) by mouth every 6 hours if needed for severe pain (7 - 10) for up to 5 days.   Patient not taking: Reported on 7/7/2024   polyethylene glycol (Glycolax, Miralax) 17 gram packet Not Taking  No   Sig: Take 17 g by mouth once daily for 7 days.   Patient not taking: Reported on 7/7/2024  Patient reports he never needed to take   predniSONE (Deltasone) 20 mg tablet Never picked up  No   Sig: Take 1.5 tablets (30 mg) by mouth once daily for 4 days. Do not fill before July 2,  2024.   Patient not taking: reports he never picked this up from the pharmacy   sennosides-docusate sodium (Mary Ann-Colace) 8.6-50 mg tablet Not Taking  No   Sig: Take 2 tablets by mouth 2 times a day for 7 days.   Patient not taking: Reported on 7/7/2024  Patient reports he never needed to take   warfarin (Coumadin) 7.5 mg tablet 7/6/2024 at am Self Yes   Sig: Take as directed per After Visit Summary.   Patient taking differently: Take 1 tablet (7.5 mg) by mouth once daily.      Facility-Administered Medications: None        The list below reflects the updated allergy list. Please review each documented allergy for additional clarification and justification.  Allergies  Reviewed by Lisa Wolff RN on 7/6/2024        Severity Reactions Comments    Cefazolin Sodium Not Specified Unknown     Cephalexin Not Specified Unknown     Cephalosporins Not Specified Unknown states was mild and it was 20 years ago            Patient accepts M2B at discharge. Pharmacy has been updated to [facility]. Patient's home pharmacy is [id number]    Sources used to complete the med history include   [facility], Dexcom fill history, care everywhere  7/1/2024 Discharge summary from [facility]  7/5/2024 Warfarin anticoagulation clinic note, updated dosing calendar: dose increase to 7.5mg once daily  Patient interview, patient was pleasant to interview, and a very good historian. Patient reports he never picked up the prednisone from the pharmacy when he was discharged from [facility]    Below are additional concerns with the patient's PTA list.  Patient has been taking warfarin as prescribed, however only had 2 doses since his most recent anticoagulation visit  Patient reports he did not need to take any laxatives since being home, and has stopped taking the cyclobenzaprine (has 3 tablets remaining) and oxycodone.  Patient reports he has about 1 week left of warfarin if using at this current dose (1 tablet daily), but will be able to get a  "refill from his home pharmacy prior to running out    Medications ADDED:  None   Medications CHANGED:  Warfarin 7.5mg daily  Medications REMOVED:   None     Eliana Kearns, PharmD, [facility] PGY1 Pharmacy Resident  [facility]  Please reach out via [employer] Secure Chat for questions, or if no response call [phone] or [email or web address] \"MedRec\"    "

## 2024-07-07 NOTE — ED PROVIDER NOTES
Emergency Department Provider Note        History of Present Illness     History provided by: Patient  Limitations to History: None  External Records Reviewed with Brief Summary: Outpatient progress note from cardiology which noted that patient was going to be having repeat INR in 3 days from now.  which showed     HPI:  Louis Rainey is a 73 y.o. male patient has past medical history of unprovoked PE in 2013.  Also previous prostate cancer which he is not currently requiring treatment as he notes it has been handled and he is just having continued follow-up outpatient.  Patient notes that he recent was hospitalized and did have operation, as a result he was not on his warfarin for several days.  Patient saw cardiology on Friday.  At that time IR was 1.2, at that time they increased warfarin dosage to 1 full pill every day, his normal regimen is half a tablet 5 days a week and a full tablet 2 days a week.  In addition, he notes that for the past 2 days he has been having pain on his right anterior shin.  He notes the pain is moved from the ankle to The mid shin at this point.  He does not recall any trauma or falling or hitting his leg.  He says it is possible that it happened but does not have a memory of it.  Denies weakness or difficulty ambulating.  Denies fever, swelling, increased warmth that he has noticed or color change.    Physical Exam   Triage vitals:  T 36.1 °C (96.9 °F)  HR 80  BP (!) 139/93  RR 16  O2 96 % None (Room air)    General: Awake, alert, in no acute distress  Eyes: Gaze conjugate.  No scleral icterus or injection  HENT: Normo-cephalic, atraumatic. No stridor  CV: Regular rate, regular rhythm. Radial pulses 2+ bilaterally  Resp: Breathing non-labored, speaking in full sentences.  Clear to auscultation bilaterally  GI: Soft, non-distended, non-tender. No rebound or guarding.  MSK/Extremities: No gross bony deformities. Moving all extremities.  Patient has tenderness to palpation over  anterior aspect of mid shin on the right leg, pain with ankle inversion, no pain on dorsiflexion or plantar flexion with or without resistance.  No pain on compression of calf or posterior knee.  Skin: Warm. Appropriate color  Neuro: Alert. Oriented. Face symmetric. Speech is fluent.    Psych: Appropriate mood and affect    Medical Decision Making & ED Course   Medical Decision Makin y.o. male presenting with right leg pain and swelling.  Due to the patient having history of unprovoked PE, as well as more recent prostate cancer.  Having been off anticoagulation for prolonged.,  And also requiring recent operation.  Clinical concern for DVT is high.  As a result we will plan to obtain a duplex to assess for this.  It could potentially be in benign nature, due to pain with ankle inversion and tenderness on the anterior aspect making muscle strain also a potential.  However repeated subtherapeutic INR's warrant further workup.  Duplex obtained and showed signs of DVT going from popliteal to posterior tibial and also peroneal vasculature.  As a result patient was initiated on high-dose heparin protocol, discussed with patient benefit of admission to which he was agreeable.  ----      Differential diagnoses considered include but are not limited to: DVT, muscle strain, minor trauma such as bone contusion.     Social Determinants of Health which Significantly Impact Care: None identified     EKG Independent Interpretation: EKG obtained which I reviewed with Dr. Becker, prolonged QT and PACs noted with normal sinus rhythm and rate of 95.    Independent Result Review and Interpretation: None obtained    Chronic conditions affecting the patient's care: As documented above in [facility]    The patient was discussed with the following consultants/services: Admission Coordinator who accepted the patient for admission    Care Considerations: As documented above in [facility]    ED Course:  Diagnoses as of 24 0516   Acute deep vein  thrombosis (DVT) of popliteal vein of right lower extremity (Multi)     Disposition   As a result of their workup, the patient will require admission to the hospital.  The patient was informed of his diagnosis.  The patient was given the opportunity to ask questions and I answered them. The patient agreed to be admitted to the hospital.    Procedures   Procedures      Carter Guzman DO  Emergency Medicine     Carter Guzman DO  Resident  07/07/24 0402       Carter Guzman DO  Resident  07/07/24 0517    -------------------------------    This patient was seen by the resident physician. I have seen and examined the patient, agree with the workup, evaluation, management and diagnosis. The care plan has been discussed and I concur.    My assessment reveals the following:    HPI:  Patient is a 72 y/o male with h/o PE in 2013, prostate cancer not currently on chemo, and HTN presenting with RLE pain x 2 days. Wafarin stopped due to recent operation for spinal stenosis. Recently saw Coumadin nurse and dosage had been increased. No F/C/CP/SOB/N/V/abd pain. Able to ambulate. No trauma or injury. No paresthesias.     PE:  Vital signs reviewed in nursing triage note, EMR flowsheets, and at patient's bedside  GEN: Patient is awake, alert, calm, cooperative, and in mild painful distress.  HEAD: Normocephalic and atraumatic.  EYES: Anicteric sclera.  MOUTH: Mucous membranes moist.  CV: Regular rate and rhythm. (+) s1/s2. No murmurs/rubs/gallops.  PULM: CTAB. No wheezes, rales, or crackles.  GI: Soft, non-tender, non-distended without rebound or guarding.  EXT: No deformities noted. Full range of motion at all major joints. Mild TTP over anterior right lower leg. Previously had TTP over right calf, but not currently.   NEURO: Moves all extremities. No gross focal deficits. Sensation grossly intact throughout.  SKIN: Warm, dry. No erythema or ecchymosis.    Labs Reviewed   CBC WITH AUTO DIFFERENTIAL - Abnormal       Result  Value    WBC 5.5      nRBC 0.0      RBC 4.20 (*)     Hemoglobin 12.4 (*)     Hematocrit 36.7 (*)     MCV 87      MCH 29.5      MCHC 33.8      RDW 14.7 (*)     Platelets 185      Neutrophils % 63.3      Immature Granulocytes %, Automated 0.4      Lymphocytes % 24.8      Monocytes % 8.0      Eosinophils % 3.1      Basophils % 0.4      Neutrophils Absolute 3.48      Immature Granulocytes Absolute, Automated 0.02      Lymphocytes Absolute 1.36      Monocytes Absolute 0.44      Eosinophils Absolute 0.17      Basophils Absolute 0.02     COMPREHENSIVE METABOLIC PANEL - Abnormal    Glucose 91      Sodium 133 (*)     Potassium 4.6      Chloride 102      Bicarbonate 22      Anion Gap 14      Urea Nitrogen 17      Creatinine 1.38 (*)     eGFR 53 (*)     Calcium 9.2      Albumin 3.9      Alkaline Phosphatase 65      Total Protein 7.7      AST 32      Bilirubin, Total 0.4      ALT 21     COAGULATION SCREEN - Abnormal    Protime 17.4 (*)     INR 1.5 (*)     aPTT 40 (*)     Narrative:     The APTT is no longer used for monitoring Unfractionated Heparin Therapy. For monitoring Heparin Therapy, use the Heparin Assay.   CBC WITH AUTO DIFFERENTIAL   RENAL FUNCTION PANEL   MAGNESIUM   COAGULATION SCREEN   TYPE AND SCREEN     Vascular US Lower Extremity Venous Duplex Right   Final Result   Nearly occlusive thrombus involving the right popliteal extending   into the partially visualized posterior tibial and peroneal veins.   The remainder of the visualized right lower extremity veins are   patent.        I personally reviewed the images/study and I agree with the findings   as stated by Chandrakant Boone MD. This study was interpreted at   [facility], [place], [place].        MACRO:   Chandrakant Boone discussed the significance and urgency of this   critical finding by telephone with  aCrter Coronel on 7/7/2024 at   3:33 am.  (**-RCF-**) Findings:  See findings.             Signed by: Miles Moreno  7/7/2024 6:42 AM   Dictation workstation:   [id number]            Medical Decision Making:  - IV  - Labs  - RLE duplex  - Heparin drip for DVT  - Admit    Differential Diagnoses Considered: DVT, MSK pain    Chronic Medical Conditions Significantly Affecting Care: PE, Prostate cancer, HTN    External Records Reviewed: I reviewed recent and relevant outside records including: Recent outpatient Coumadin nurse note.     Escalation of Care:  Appropriate for inpatient management    MD Giuliano Britton MD  07/07/24 0795

## 2024-07-08 ENCOUNTER — TELEPHONE (OUTPATIENT)
Dept: CARDIOLOGY | Facility: CLINIC | Age: 74
End: 2024-07-08
Payer: MEDICARE

## 2024-07-08 LAB
ATRIAL RATE: 95 BPM
INR PPP: 1.7 (ref 0.9–1.1)
P AXIS: 43 DEGREES
P OFFSET: 193 MS
P ONSET: 123 MS
PR INTERVAL: 194 MS
PROTHROMBIN TIME: 19.2 SECONDS (ref 9.8–12.8)
Q ONSET: 220 MS
QRS COUNT: 15 BEATS
QRS DURATION: 102 MS
QT INTERVAL: 396 MS
QTC CALCULATION(BAZETT): 497 MS
QTC FREDERICIA: 461 MS
R AXIS: -18 DEGREES
T AXIS: 90 DEGREES
T OFFSET: 418 MS
UFH PPP CHRO-ACNC: 0.4 IU/ML
VENTRICULAR RATE: 95 BPM

## 2024-07-08 PROCEDURE — 2500000002 HC RX 250 W HCPCS SELF ADMINISTERED DRUGS (ALT 637 FOR MEDICARE OP, ALT 636 FOR OP/ED): Performed by: INTERNAL MEDICINE

## 2024-07-08 PROCEDURE — 99232 SBSQ HOSP IP/OBS MODERATE 35: CPT | Performed by: INTERNAL MEDICINE

## 2024-07-08 PROCEDURE — 2500000004 HC RX 250 GENERAL PHARMACY W/ HCPCS (ALT 636 FOR OP/ED): Performed by: STUDENT IN AN ORGANIZED HEALTH CARE EDUCATION/TRAINING PROGRAM

## 2024-07-08 PROCEDURE — 97161 PT EVAL LOW COMPLEX 20 MIN: CPT | Mod: GP

## 2024-07-08 PROCEDURE — 2500000001 HC RX 250 WO HCPCS SELF ADMINISTERED DRUGS (ALT 637 FOR MEDICARE OP)

## 2024-07-08 PROCEDURE — 85610 PROTHROMBIN TIME: CPT | Performed by: INTERNAL MEDICINE

## 2024-07-08 PROCEDURE — 85520 HEPARIN ASSAY: CPT | Performed by: STUDENT IN AN ORGANIZED HEALTH CARE EDUCATION/TRAINING PROGRAM

## 2024-07-08 PROCEDURE — 1100000001 HC PRIVATE ROOM DAILY

## 2024-07-08 PROCEDURE — 36415 COLL VENOUS BLD VENIPUNCTURE: CPT | Performed by: STUDENT IN AN ORGANIZED HEALTH CARE EDUCATION/TRAINING PROGRAM

## 2024-07-08 PROCEDURE — 97530 THERAPEUTIC ACTIVITIES: CPT | Mod: GP

## 2024-07-08 RX ORDER — POTASSIUM CHLORIDE 20 MEQ/1
20 TABLET, EXTENDED RELEASE ORAL ONCE
Status: COMPLETED | OUTPATIENT
Start: 2024-07-08 | End: 2024-07-08

## 2024-07-08 RX ORDER — WARFARIN 10 MG/1
10 TABLET ORAL ONCE
Status: COMPLETED | OUTPATIENT
Start: 2024-07-08 | End: 2024-07-08

## 2024-07-08 ASSESSMENT — COGNITIVE AND FUNCTIONAL STATUS - GENERAL
STANDING UP FROM CHAIR USING ARMS: A LITTLE
WALKING IN HOSPITAL ROOM: A LITTLE
TURNING FROM BACK TO SIDE WHILE IN FLAT BAD: A LITTLE
WALKING IN HOSPITAL ROOM: A LITTLE
MOVING TO AND FROM BED TO CHAIR: A LITTLE
MOBILITY SCORE: 18
MOBILITY SCORE: 22
MOVING FROM LYING ON BACK TO SITTING ON SIDE OF FLAT BED WITH BEDRAILS: A LITTLE
DAILY ACTIVITIY SCORE: 24
CLIMB 3 TO 5 STEPS WITH RAILING: A LITTLE
CLIMB 3 TO 5 STEPS WITH RAILING: A LITTLE

## 2024-07-08 ASSESSMENT — PAIN - FUNCTIONAL ASSESSMENT
PAIN_FUNCTIONAL_ASSESSMENT: 0-10

## 2024-07-08 ASSESSMENT — PAIN SCALES - GENERAL
PAINLEVEL_OUTOF10: 0 - NO PAIN
PAINLEVEL_OUTOF10: 1
PAINLEVEL_OUTOF10: 0 - NO PAIN

## 2024-07-08 ASSESSMENT — ACTIVITIES OF DAILY LIVING (ADL): ADL_ASSISTANCE: INDEPENDENT

## 2024-07-08 NOTE — PROGRESS NOTES
Louis Rainey is a 73 y.o. male on day 1 of admission presenting with Acute deep vein thrombosis (DVT) of popliteal vein of right lower extremity (Multi).    Subjective   No SOB at rest.     Rt leg pain is improving.     RN reported no new issues.         Objective     Physical Exam  Constitutional:       Appearance: Normal appearance.   HENT:      Head: Normocephalic.      Nose: Nose normal.   Eyes:      Extraocular Movements: Extraocular movements intact.      Pupils: Pupils are equal, round, and reactive to light.   Cardiovascular:      Rate and Rhythm: Normal rate and regular rhythm.      Heart sounds: Normal heart sounds. No murmur heard.  Pulmonary:      Effort: No respiratory distress.      Breath sounds: Normal breath sounds. No wheezing.   Abdominal:      General: There is no distension.      Palpations: Abdomen is soft.      Tenderness: There is no abdominal tenderness.   Musculoskeletal:         General: Normal range of motion.      Cervical back: Normal range of motion.   Skin:     General: Skin is warm.   Neurological:      General: No focal deficit present.      Mental Status: He is alert and oriented to person, place, and time.   Psychiatric:         Mood and Affect: Mood normal.         Last Recorded Vitals  Blood pressure 113/69, pulse 81, temperature 36.7 °C (98.1 °F), temperature source Temporal, resp. rate 16, height 1.829 m (6'), weight 95.5 kg (210 lb 8.6 oz), SpO2 95%.  Intake/Output last 3 Shifts:  No intake/output data recorded.    Relevant Results  Scheduled medications  amLODIPine, 5 mg, oral, Daily  multivitamin with minerals, 1 tablet, oral, Daily  potassium chloride CR, 20 mEq, oral, Once  warfarin, 10 mg, oral, Once      Continuous medications  heparin, 0-4,500 Units/hr, Last Rate: 1,400 Units/hr (07/08/24 1526)      PRN medications  PRN medications: heparin    Results for orders placed or performed during the hospital encounter of 07/06/24 (from the past 24 hour(s))   Protime-INR    Result Value Ref Range    Protime 19.2 (H) 9.8 - 12.8 seconds    INR 1.7 (H) 0.9 - 1.1   Heparin Assay, UFH   Result Value Ref Range    Heparin Unfractionated 0.4 See Comment Below for Therapeutic Ranges IU/mL         Assessment/Plan   Principal Problem:    Acute deep vein thrombosis (DVT) of popliteal vein of right lower extremity (Multi)    Mr. Rainey is a 73 year old Male with a PMH of HTN, CKD, afib, unprovoked DVT/PE (2013, on Warfarin) and prostate CA (s/p chemo & RT) who is presented with RLE pain due to new DVT. New DVT (confirmed on RLE venous duplex u/s) likely occurred during the planned hold of warfarin for surgery on 6/26 (warfarin held 6/21-7/1). Subtherapeutic INR 1.3 -> 1.5 between 6/30 and 7/6. CT angio 6/30 negative for PE, low suspicion for PE at this time as patient is not SOB and has stable vital signs.          # New Nearly Occlusive RLE DVT  # Hx Unprovoked PE (2013)  -Continue heparin gtt (HI)  Continue coumadin   INR 1.7 today  Takes 7.5 mg daily   Give coumadin 10 mg today  Monitor INR,  ( Can DC Heparin once INR is > 2.0)  Follow up in vascular medicine,      # HTN  -Continue home amlodipine 5mg     Code Status: Full Code  NOK: Milagro Rainey (daughter) - [phone]       Dispo: Discharge once INR is therapeutic. He got follow up with coumadin clinic        Nile Maurer MD

## 2024-07-08 NOTE — CARE PLAN
Problem: Fall/Injury  Goal: Not fall by end of shift  Outcome: Progressing  Goal: Be free from injury by end of the shift  Outcome: Progressing  Goal: Verbalize understanding of personal risk factors for fall in the hospital  Outcome: Progressing  Goal: Verbalize understanding of risk factor reduction measures to prevent injury from fall in the home  Outcome: Progressing  Goal: Use assistive devices by end of the shift  Outcome: Progressing  Goal: Pace activities to prevent fatigue by end of the shift  Outcome: Progressing     Problem: Pain - Adult  Goal: Verbalizes/displays adequate comfort level or baseline comfort level  Outcome: Progressing     Problem: Safety - Adult  Goal: Free from fall injury  Outcome: Progressing     Problem: Discharge Planning  Goal: Discharge to home or other facility with appropriate resources  Outcome: Progressing     Problem: Chronic Conditions and Co-morbidities  Goal: Patient's chronic conditions and co-morbidity symptoms are monitored and maintained or improved  Outcome: Progressing     The clinical goals for the shift include Patient remains safe and free from falls.

## 2024-07-08 NOTE — PROGRESS NOTES
Physical Therapy    Physical Therapy Evaluation & Treatment    Patient Name: Louis Rainey  MRN: [id number]  Today's Date: 7/8/2024   Time Calculation  Start Time: 1024  Stop Time: 1049  Time Calculation (min): 25 min    Assessment/Plan   PT Assessment  PT Assessment Results: Decreased strength, Decreased endurance, Impaired balance, Decreased mobility, Pain  Rehab Prognosis: Good  Barriers to Discharge: none  Evaluation/Treatment Tolerance: Patient tolerated treatment well  Medical Staff Made Aware: Yes  Strengths: Ability to acquire knowledge, Access to adaptive/assistive products, Attitude of self, Capable of completing ADLs semi/independent, Insight into problems, Premorbid level of function  End of Session Communication: Bedside nurse    Assessment Comment: Pt. is a 73 yom that presented with new R LE DVT. Pt presents with impairments including R LE pain/discomfort, decreased functional strength, and decreased activity tolerance/endurance. At this time, pt demos no further acute PT needs, encouraged pt to ambulate multiple times per day under RN discretion. Pt will benefit from resumption of intensity PT upon discharge.    End of Session Patient Position: Bed, 3 rail up, Alarm on     IP OR SWING BED PT PLAN  Inpatient or Swing Bed: Inpatient  PT Plan  Treatment/Interventions: Bed mobility, Transfer training, Gait training, Stair training, Balance training, Strengthening, Endurance training, Range of motion, Therapeutic exercise, Therapeutic activity, Home exercise program, Postural re-education  PT Plan: Ongoing PT  PT Frequency: PT eval only  PT Discharge Recommendations: Low intensity level of continued care, No further acute PT  PT Recommended Transfer Status: Assistive device, Stand by assist  PT - OK to Discharge: Yes (eval complete, refer to dispo)      Subjective     General Visit Information:  General  Reason for Referral: new RLE DVT  Past Medical History Relevant to Rehab: HTN, CKD, afib, unprovoked  DVT/PE (2013, on Warfarin) and prostate CA (s/p chemo & RT), s/p MIS L2-3, L4-3 lami, L4 kyphoplasty on 6/26/24  Family/Caregiver Present: No  Prior to Session Communication: Bedside nurse  Patient Position Received: Bed, 3 rail up, Alarm on  Preferred Learning Style: auditory, verbal  General Comment: Pt. received supine in bed, pleasant and agreeable to participate. Per RN pt. has been anticoagulated > 24 hours and heparin assay therapeutic.    Home Living:  Home Living  Type of Home: House  Lives With: Spouse  Home Adaptive Equipment: Cane  Home Layout: Multi-level, Full bath main level, Bed/bath upstairs  Home Access: Stairs to enter with rails  Entrance Stairs-Rails: Right  Entrance Stairs-Number of Steps: 5  Bathroom Shower/Tub: Tub/shower unit (WIS in basement though pt. reports does not use)  Bathroom Equipment: None    Prior Level of Function:  Prior Function Per Pt/Caregiver Report  Level of Clifford: Independent with ADLs and functional transfers, Independent with homemaking with ambulation  ADL Assistance: Independent  Homemaking Assistance: Independent  Ambulatory Assistance: Independent (community ambulator, no AD at baseline, denied falls)  Vocational: Retired  Prior Function Comments: +drives. Pt. reports intermittently utilizing cane in unfamiliar environments since recent surgery. Pt. did not start home PT/OT yet, reports was supposed to start today    Precautions:  Precautions  Hearing/Visual Limitations: WFL  Medical Precautions: Fall precautions  Post-Surgical Precautions: Spinal precautions  Precautions Comment: Pt. able to recall spinal precautions and log roll technique from recent surgery.    Objective   Pain:  Pain Assessment  Pain Assessment: 0-10  0-10 (Numeric) Pain Score: 1  Pain Type: Acute pain  Pain Location: Leg  Pain Orientation: Right    Cognition:  Cognition  Overall Cognitive Status: Within Functional Limits  Orientation Level: Oriented X4    General Assessments:  Activity  Tolerance  Endurance: Tolerates 10 - 20 min exercise with multiple rests  Early Mobility/Exercise Safety Screen: Proceed with mobilization - No exclusion criteria met    Sensation  Light Touch: No apparent deficits  Sensation Comment: N/T in aby feet at baseline    Postural Control  Postural Control: Within Functional Limits    Static Sitting Balance  Static Sitting-Comment/Number of Minutes: Sup  Dynamic Sitting Balance  Dynamic Sitting-Comments: SBA    Static Standing Balance  Static Standing-Comment/Number of Minutes: SBA  Dynamic Standing Balance  Dynamic Standing-Comments: CGA    Functional Assessments:  Bed Mobility  Bed Mobility: Yes  Bed Mobility 1  Bed Mobility 1: Supine to sitting  Level of Assistance 1: Close supervision  Bed Mobility Comments 1: HOB elevated, log roll technique  Bed Mobility 2  Bed Mobility  2: Sitting to supine  Level of Assistance 2: Close supervision  Bed Mobility Comments 2: HOB elevated    Transfers  Transfer: Yes  Transfer 1  Transfer From 1: Sit to, Stand to  Transfer to 1: Stand, Sit  Technique 1: Sit to stand, Stand to sit  Transfer Level of Assistance 1: Close supervision    Ambulation/Gait Training  Ambulation/Gait Training Performed: Yes  Ambulation/Gait Training 1  Surface 1: Level tile  Device 1: IV Pole  Assistance 1: Contact guard  Quality of Gait 1: Decreased step length (decreased fernandez)  Comments/Distance (ft) 1: 150 ft    Stairs  Stairs: Yes  Stairs  Rails 1: Right  Device 1: Railing  Assistance 1: Close supervision  Comment/Number of Steps 1: ascend/descend x4 stairs    Extremity/Trunk Assessments:  RLE   RLE : Within Functional Limits  LLE   LLE : Within Functional Limits    Treatments:  Therapeutic Activity  Therapeutic Activity Performed: Yes  Therapeutic Activity 1: Reviewed spinal precautions, log roll technique, and techniques for lower body dressing per pt. request.  Therapeutic Activity 2: Seated rest break following stair training, ~5 min. Emphasis on  upright posture and pursed lip breathing.    Ambulation/Gait Training  Ambulation/Gait Training Performed: Yes  Ambulation/Gait Training 2  Surface 2: Level tile  Device 2: IV Pole  Assistance 2: Contact guard  Quality of Gait 2: Decreased step length, Antalgic (decreased fernandez, endorsing R lateral knee pain following stair training)  Comments/Distance (ft) 2: 150 ft    Transfers  Transfer: Yes  Transfers 2  Transfer From 2: Sit to, Stand to  Transfer to 2: Stand, Sit  Technique 2: Sit to stand, Stand to sit  Transfer Level of Assistance 2: Close supervision  Trials/Comments 2: Elevated surface on second trial    Outcome Measures:  [facility] Basic Mobility  Turning from your back to your side while in a flat bed without using bedrails: A little  Moving from lying on your back to sitting on the side of a flat bed without using bedrails: A little  Moving to and from bed to chair (including a wheelchair): A little  Standing up from a chair using your arms (e.g. wheelchair or bedside chair): A little  To walk in hospital room: A little  Climbing 3-5 steps with railing: A little  Basic Mobility - Total Score: 18    Encounter Problems       Encounter Problems (Active)       Pain - Adult              Education Documentation  Home Exercise Program, taught by Francia Jo PT at 7/8/2024 11:58 AM.  Learner: Patient  Readiness: Acceptance  Method: Explanation, Demonstration  Response: Verbalizes Understanding, Demonstrated Understanding    Precautions, taught by Francia Jo PT at 7/8/2024 11:58 AM.  Learner: Patient  Readiness: Acceptance  Method: Explanation, Demonstration  Response: Verbalizes Understanding, Demonstrated Understanding    Mobility Training, taught by Francia Jo PT at 7/8/2024 11:58 AM.  Learner: Patient  Readiness: Acceptance  Method: Explanation, Demonstration  Response: Verbalizes Understanding, Demonstrated Understanding    Education Comments  No comments found.        07/08/24 at 12:04 PM  - Francia Jo, PT

## 2024-07-08 NOTE — CARE PLAN
Problem: Fall/Injury  Goal: Not fall by end of shift  Outcome: Progressing  Goal: Be free from injury by end of the shift  Outcome: Progressing  Goal: Verbalize understanding of personal risk factors for fall in the hospital  Outcome: Progressing  Goal: Verbalize understanding of risk factor reduction measures to prevent injury from fall in the home  Outcome: Progressing  Goal: Use assistive devices by end of the shift  Outcome: Progressing  Goal: Pace activities to prevent fatigue by end of the shift  Outcome: Progressing     Problem: Pain - Adult  Goal: Verbalizes/displays adequate comfort level or baseline comfort level  Outcome: Progressing     Problem: Safety - Adult  Goal: Free from fall injury  Outcome: Progressing     Problem: Discharge Planning  Goal: Discharge to home or other facility with appropriate resources  Outcome: Progressing     Problem: Chronic Conditions and Co-morbidities  Goal: Patient's chronic conditions and co-morbidity symptoms are monitored and maintained or improved  Outcome: Progressing   The patient's goals for the shift include      The clinical goals for the shift include Pt will remain HDS during the shift

## 2024-07-08 NOTE — PROGRESS NOTES
I met with Louis at the bedside regarding discharge planning and home going needs. Patient lives at home alone where he is independent with ADL's he does have a walker and cane in the home. Patient is not medically cleared for home recommended for low intensity therapy but refusing will discharge home with no needs, he will drive himself home his vehicle is here at the hospital with him. I will continue follow with a safe discharge plan.

## 2024-07-08 NOTE — NURSING NOTE
Pt to the unit from the ED. Ambulated pt from the bed to the cart with cane and 1 assist.  Alert and oriented. Pt Denies any pain at this time. On heparin gtt 1400 units/hr, continued rate. Pt clothing in closet, cell phone and  at bedside. Assessment complete in EMR. Oriented pt to room. Denies any questions at this time. No problematic complaints at this time. Assessment ongoing.     Chevy Ji RN   [phone]

## 2024-07-08 NOTE — TELEPHONE ENCOUNTER
Pt called to say that he was admitted with a thrombus to his RLE.  He was off warfarin for several days prior to a procedure, and was not bridged.  His INR dropped to 1.2 on 7/5, and he was admitted to hospital from ED on 7/6. He has a appt, made previously, for 7/10.

## 2024-07-09 VITALS
WEIGHT: 210.54 LBS | BODY MASS INDEX: 28.52 KG/M2 | HEIGHT: 72 IN | SYSTOLIC BLOOD PRESSURE: 124 MMHG | DIASTOLIC BLOOD PRESSURE: 80 MMHG | RESPIRATION RATE: 16 BRPM | TEMPERATURE: 97.5 F | OXYGEN SATURATION: 95 % | HEART RATE: 83 BPM

## 2024-07-09 PROBLEM — Z79.01 SUBTHERAPEUTIC ANTICOAGULATION: Status: ACTIVE | Noted: 2024-07-09

## 2024-07-09 PROBLEM — Z51.81 SUBTHERAPEUTIC ANTICOAGULATION: Status: ACTIVE | Noted: 2024-07-09

## 2024-07-09 LAB
ALBUMIN SERPL BCP-MCNC: 3.3 G/DL (ref 3.4–5)
ALP SERPL-CCNC: 52 U/L (ref 33–136)
ALT SERPL W P-5'-P-CCNC: 16 U/L (ref 10–52)
ANION GAP SERPL CALC-SCNC: 12 MMOL/L (ref 10–20)
AST SERPL W P-5'-P-CCNC: 29 U/L (ref 9–39)
BILIRUB SERPL-MCNC: 0.3 MG/DL (ref 0–1.2)
BUN SERPL-MCNC: 13 MG/DL (ref 6–23)
CALCIUM SERPL-MCNC: 8.6 MG/DL (ref 8.6–10.6)
CHLORIDE SERPL-SCNC: 108 MMOL/L (ref 98–107)
CO2 SERPL-SCNC: 22 MMOL/L (ref 21–32)
CREAT SERPL-MCNC: 1.32 MG/DL (ref 0.5–1.3)
EGFRCR SERPLBLD CKD-EPI 2021: 56 ML/MIN/1.73M*2
ERYTHROCYTE [DISTWIDTH] IN BLOOD BY AUTOMATED COUNT: 15 % (ref 11.5–14.5)
GLUCOSE SERPL-MCNC: 104 MG/DL (ref 74–99)
HCT VFR BLD AUTO: 36.1 % (ref 41–52)
HGB BLD-MCNC: 11 G/DL (ref 13.5–17.5)
INR PPP: 2 (ref 0.9–1.1)
MCH RBC QN AUTO: 28.4 PG (ref 26–34)
MCHC RBC AUTO-ENTMCNC: 30.5 G/DL (ref 32–36)
MCV RBC AUTO: 93 FL (ref 80–100)
NRBC BLD-RTO: 0 /100 WBCS (ref 0–0)
PLATELET # BLD AUTO: 143 X10*3/UL (ref 150–450)
POTASSIUM SERPL-SCNC: 4.5 MMOL/L (ref 3.5–5.3)
PROT SERPL-MCNC: 6.5 G/DL (ref 6.4–8.2)
PROTHROMBIN TIME: 22.8 SECONDS (ref 9.8–12.8)
RBC # BLD AUTO: 3.88 X10*6/UL (ref 4.5–5.9)
SODIUM SERPL-SCNC: 137 MMOL/L (ref 136–145)
UFH PPP CHRO-ACNC: 0.3 IU/ML
WBC # BLD AUTO: 4.5 X10*3/UL (ref 4.4–11.3)

## 2024-07-09 PROCEDURE — 2500000001 HC RX 250 WO HCPCS SELF ADMINISTERED DRUGS (ALT 637 FOR MEDICARE OP)

## 2024-07-09 PROCEDURE — 97535 SELF CARE MNGMENT TRAINING: CPT | Mod: GO

## 2024-07-09 PROCEDURE — 85610 PROTHROMBIN TIME: CPT | Performed by: INTERNAL MEDICINE

## 2024-07-09 PROCEDURE — 36415 COLL VENOUS BLD VENIPUNCTURE: CPT | Performed by: STUDENT IN AN ORGANIZED HEALTH CARE EDUCATION/TRAINING PROGRAM

## 2024-07-09 PROCEDURE — 80053 COMPREHEN METABOLIC PANEL: CPT | Performed by: INTERNAL MEDICINE

## 2024-07-09 PROCEDURE — 85027 COMPLETE CBC AUTOMATED: CPT | Performed by: STUDENT IN AN ORGANIZED HEALTH CARE EDUCATION/TRAINING PROGRAM

## 2024-07-09 PROCEDURE — 85520 HEPARIN ASSAY: CPT | Performed by: STUDENT IN AN ORGANIZED HEALTH CARE EDUCATION/TRAINING PROGRAM

## 2024-07-09 PROCEDURE — 2500000004 HC RX 250 GENERAL PHARMACY W/ HCPCS (ALT 636 FOR OP/ED): Performed by: STUDENT IN AN ORGANIZED HEALTH CARE EDUCATION/TRAINING PROGRAM

## 2024-07-09 PROCEDURE — 97165 OT EVAL LOW COMPLEX 30 MIN: CPT | Mod: GO

## 2024-07-09 ASSESSMENT — COGNITIVE AND FUNCTIONAL STATUS - GENERAL: DAILY ACTIVITIY SCORE: 24

## 2024-07-09 ASSESSMENT — PAIN SCALES - GENERAL
PAINLEVEL_OUTOF10: 0 - NO PAIN
PAINLEVEL_OUTOF10: 0 - NO PAIN

## 2024-07-09 ASSESSMENT — ACTIVITIES OF DAILY LIVING (ADL)
ADL_ASSISTANCE: INDEPENDENT
HOME_MANAGEMENT_TIME_ENTRY: 10

## 2024-07-09 ASSESSMENT — PAIN - FUNCTIONAL ASSESSMENT: PAIN_FUNCTIONAL_ASSESSMENT: 0-10

## 2024-07-09 NOTE — PROGRESS NOTES
Occupational Therapy    Evaluation/Treatment    Patient Name: Louis Rainey  MRN: [id number]  Today's Date: 7/9/2024  Time Calculation  Start Time: 0752  Stop Time: 0812  Time Calculation (min): 20 min      Assessment:  Prognosis: Good  Evaluation/Treatment Tolerance: Patient tolerated treatment well  End of Session Communication: Bedside nurse (Nurse in room)  End of Session Patient Position:  (In bed, nurse in room)  OT Assessment Results: Decreased safe judgment during ADL  Prognosis: Good  Evaluation/Treatment Tolerance: Patient tolerated treatment well  Strengths: Capable of completing ADLs semi/independent, Ability to acquire knowledge, Attitude of self    Plan:  No Skilled OT: No acute OT goals identified  OT Discharge Recommendations: Low intensity level of continued care, No further acute OT  OT Recommended Transfer Status: Independent  OT - OK to Discharge: Yes     Subjective   Current Problem:  1. Acute deep vein thrombosis (DVT) of popliteal vein of right lower extremity (Multi)          General:  General  Reason for Referral: new RLE DVT  Past Medical History Relevant to Rehab: HTN, CKD, afib, unprovoked DVT/PE (2013, on Warfarin) and prostate CA (s/p chemo & RT), s/p MIS L2-3, L4-3 lami, L4 kyphoplasty on 6/26/24  Family/Caregiver Present: No  Prior to Session Communication: Bedside nurse  Patient Position Received:  (EOB)  General Comment:  (Pt seated EOB at beginning of session. Expressed wanting to wash up and change gown. Performed functional mobility from the bed to the toilet and back, and from the bed to the bathroom sink and back. Also performed UB and LB dressing and grooming ADLs. Recommending low intensity OT due to lack of support at home. No further acute OT needs at this time.)    Precautions:  Medical Precautions: Fall precautions  Post-Surgical Precautions: Spinal precautions  Precautions Comment:  (When asked to recall spinal precautions, pt able to explain with detail how to get  in/out of bed and restrict bending/twisting.)    Pain:  Pain Assessment  0-10 (Numeric) Pain Score: 0 - No pain (States having discomfort in RLE)    Objective   Cognition:  Overall Cognitive Status: Within Functional Limits  Orientation Level: Oriented X4  Impulsive: Mildly     Home Living:  Type of Home: House  Lives With:  (alone, endorsed not having anyone to help)  Home Adaptive Equipment: Walker rolling or standard, Cane (Does not use; only used cane at arrival to hospital)  Home Layout: Two level (Bath/bed on first floor)  Home Access: Stairs to enter with rails  Entrance Stairs-Number of Steps: 5  Bathroom Shower/Tub: Tub/shower unit    Prior Function:  Level of Clinch: Independent with ADLs and functional transfers, Independent with homemaking with ambulation  ADL Assistance: Independent  Homemaking Assistance: Independent  Ambulatory Assistance: Independent  Hand Dominance: Right    ADL:  Eating Assistance: Independent (Anticipated)  Grooming Assistance:  (Set up; Pt performed grooming ADLs (brushing teeth, washing face) standing at sink independently after being given supplies)  Bathing Assistance:  (Set up- pt independently used washclothe and soap to wash UB after being given supplies while standing at the sink)  UE Dressing Assistance: Minimal (Pt doffed and donned gown while seated EOB; required Max for button management)  LE Dressing Assistance:  (Set up - Pt donned pants while seated EOB after being handed pants)  Toileting Assistance with Device: Stand by (Anticipate)  Functional Assistance: Stand by (Anticipate)    Activity Tolerance:  Endurance:  (Pt tolerated entire session)    Bed Mobility/Transfers: Bed Mobility  Bed Mobility: Yes  Bed Mobility 1  Bed Mobility 1: Sitting to supine  Level of Assistance 1: Distant supervision  Bed Mobility Comments 1: Pt able to verbalize and demonstrate appropriate spinal precautions    Transfers  Transfer: Yes  Transfer 1  Transfer From 1: Sit  to  Transfer to 1: Stand  Transfer Device 1:  (No device)  Transfer Level of Assistance 1: Close supervision  Transfers 2  Transfer From 2: Stand to  Transfer to 2: Sit  Transfer Device 2:  (no device)  Transfer Level of Assistance 2: Close supervision  Transfers 3  Transfer From 3: Stand to, Toilet to  Transfer to 3: Toilet, Stand  Transfer Device 3:  (grab bars)  Transfer Level of Assistance 3: Distant supervision    Functional Mobility:  Functional Mobility  Functional Mobility Performed: Yes  Functional Mobility 1  Device 1:  (IV pole intermittently)  Assistance 1:  (Supervision-CGA)  Comments 1: Pt performed functional mobility from the bed to the toilet and back to bed with supervision-CGA and intermittent assist from IV pole  Functional Mobility 2  Device 2: No device (Intermittent IV pole)  Assistance 2:  (Supervision-CGA)  Comments 2: Pt performed functional mobility from the bed to the bathroom sink and back with supervision-CGA and intermittent assist from IV pole    Sitting Balance:  Static Sitting Balance  Static Sitting-Level of Assistance: Distant supervision    Standing Balance:  Static Standing Balance  Static Standing-Level of Assistance: Distant supervision (Pt stood at sink with distant supervision to perform ADLs)     Vision:    and Vision - Complex Assessment  Vision Comments: Pt does not report any acute visual deficits; endorses wearing reading glasses    Sensation:  Sensation Comment:  (Endorses some tingling in toes)    Perception:  Motor Planning: Appears intact    Coordination:  Movements are Fluid and Coordinated: Yes     Hand Function:  Gross Grasp: Functional  Coordination: Functional    Extremities: RUE   RUE : Within Functional Limits (Assessed via aDLs) and LUE   LUE: Within Functional Limits (Assessed via ADLs)    Outcome Measures:[facility] Daily Activity  Putting on and taking off regular lower body clothing: None  Bathing (including washing, rinsing, drying): None  Putting on and  taking off regular upper body clothing: None  Toileting, which includes using toilet, bedpan or urinal: None  Taking care of personal grooming such as brushing teeth: None  Eating Meals: None  Daily Activity - Total Score: 24     and Brief Confusion Assessment Method (bCAM)  CAM Result: CAM -    Education Documentation  Precautions, taught by Sarai Mensah OT at 7/9/2024 11:14 AM.  Learner: Patient  Readiness: Acceptance  Method: Explanation  Response: Verbalizes Understanding, Demonstrated Understanding    ADL Training, taught by Sarai Mensah OT at 7/9/2024 11:14 AM.  Learner: Patient  Readiness: Acceptance  Method: Explanation  Response: Verbalizes Understanding, Demonstrated Understanding    Education Comments  No comments found.      OP EDUCATION:  Education  Individual(s) Educated: Patient  Education Provided: Diagnosis & Precautions  Patient/Caregiver Demonstrated Understanding: yes  Patient Response to Education: Patient/Caregiver Verbalized Understanding of Information, Patient/Caregiver Performed Return Demonstration of Exercises/Activities    Sarai Mensah OTR/L

## 2024-07-09 NOTE — CARE PLAN
The patient's goals for the shift include      The clinical goals for the shift include Patient will be free from falls and injury    Over the shift, the patient did not make progress toward the following goals. Barriers to progression include connected to IV. Recommendations to address these barriers include call light in reach and bed in lowest position.    Problem: Fall/Injury  Goal: Not fall by end of shift  Outcome: Not Progressing  Goal: Be free from injury by end of the shift  Outcome: Not Progressing  Goal: Verbalize understanding of personal risk factors for fall in the hospital  Outcome: Not Progressing  Goal: Verbalize understanding of risk factor reduction measures to prevent injury from fall in the home  Outcome: Not Progressing  Goal: Use assistive devices by end of the shift  Outcome: Not Progressing  Goal: Pace activities to prevent fatigue by end of the shift  Outcome: Not Progressing     Problem: Pain - Adult  Goal: Verbalizes/displays adequate comfort level or baseline comfort level  Outcome: Not Progressing     Problem: Safety - Adult  Goal: Free from fall injury  Outcome: Not Progressing     Problem: Discharge Planning  Goal: Discharge to home or other facility with appropriate resources  Outcome: Not Progressing     Problem: Chronic Conditions and Co-morbidities  Goal: Patient's chronic conditions and co-morbidity symptoms are monitored and maintained or improved  Outcome: Not Progressing

## 2024-07-09 NOTE — CARE PLAN
Problem: Fall/Injury  Goal: Not fall by end of shift  Outcome: Progressing  Goal: Be free from injury by end of the shift  Outcome: Progressing  Goal: Verbalize understanding of personal risk factors for fall in the hospital  Outcome: Progressing  Goal: Verbalize understanding of risk factor reduction measures to prevent injury from fall in the home  Outcome: Progressing  Goal: Use assistive devices by end of the shift  Outcome: Progressing  Goal: Pace activities to prevent fatigue by end of the shift  Outcome: Progressing     Problem: Safety - Adult  Goal: Free from fall injury  Outcome: Progressing     Problem: Discharge Planning  Goal: Discharge to home or other facility with appropriate resources  Outcome: Progressing     Problem: Chronic Conditions and Co-morbidities  Goal: Patient's chronic conditions and co-morbidity symptoms are monitored and maintained or improved  Outcome: Progressing    The clinical goals for the shift include Patient remains safe and free from falls.

## 2024-07-09 NOTE — NURSING NOTE
"Reviewed discharge instructions with the patient. Informed the patient to stop taking tylenol, flexeril, oxycodone, miralax, prednisone, and katherine-colace. Patient reports he does not take these medications. Informed the patient to not drive or operate machinery while taking oxycodone and flexeril. Patient verbalized understanding to not drive or operate machinery while taking oxycodone and flexeril. Informed the patient to follow up to get his INR checked tomorrow and he verbalized understanding. Reviewed the educational information regarding warfarin with emphasis to go to the ED for signs and symptoms of a PE, stroke, signs of bleeding, fall and especially fall with head injury. Reviewed bleeding symptoms listed under \"signs of bleeding\". Reviewed correct administration of medications as listed on his medication list. Informed the patient to take 7.5mg of Coumadin this evening. All questions answered prior to discharge. Patient verbalized understanding of the instructions.  "

## 2024-07-10 ENCOUNTER — ANTICOAGULATION - WARFARIN VISIT (OUTPATIENT)
Dept: CARDIOLOGY | Facility: CLINIC | Age: 74
End: 2024-07-10
Payer: MEDICARE

## 2024-07-10 DIAGNOSIS — I26.99 PULMONARY EMBOLISM, UNSPECIFIED CHRONICITY, UNSPECIFIED PULMONARY EMBOLISM TYPE, UNSPECIFIED WHETHER ACUTE COR PULMONALE PRESENT (MULTI): Primary | ICD-10-CM

## 2024-07-10 LAB
POC INR: 2.3
POC PROTHROMBIN TIME: NORMAL

## 2024-07-10 PROCEDURE — 99211 OFF/OP EST MAY X REQ PHY/QHP: CPT

## 2024-07-10 PROCEDURE — 85610 PROTHROMBIN TIME: CPT | Mod: QW

## 2024-07-10 NOTE — PROGRESS NOTES
Patient identification verified with 2 identifiers.    Location: [facility] at [facility] - [address] [address] [phone] option #1     Referring Physician: LAURENCE PHAM  Enrollment/ Re-enrollment date: 2025   INR Goal: 2.0-3.0  INR monitoring is per AMS protocol.  Anticoagulation Medication: warfarin  Indication: Pulmonary Embolism (PE)    Subjective   Bleeding signs/symptoms: No    Bruising: No   Major bleeding event: No  Thrombosis signs/symptoms: No  Thromboembolic event: No  Missed doses: No  Extra doses: No  Medication changes: No  Dietary changes: No  Change in health: No  Change in activity: No  Alcohol: No  Other concerns: No    Upcoming Procedures:  Does the Patient Have any upcoming procedures that require interruption in anticoagulation therapy? no  Does the patient require bridging? no      Anticoagulation Summary  As of 7/10/2024      INR goal:  2.0-3.0   TTR:  48.9% (9 mo)   INR used for dosin.30 (7/10/2024)   Weekly warfarin total:  52.5 mg               Assessment/Plan   Therapeutic     1. New dose: no change    2. Next INR: 1 week      Education provided to patient during the visit:  Patient instructed to call in interim with questions, concerns and changes.   Patient educated on interactions between medications and warfarin.   Patient educated on dietary consistency in vitamin k consumption.   Patient educated on affects of alcohol consumption while taking warfarin.   Patient educated on signs of bleeding/clotting.   Patient educated on compliance with dosing, follow up appointments, and prescribed plan of care.

## 2024-07-12 ENCOUNTER — OFFICE VISIT (OUTPATIENT)
Dept: CARDIOLOGY | Facility: HOSPITAL | Age: 74
End: 2024-07-12
Payer: MEDICARE

## 2024-07-12 ENCOUNTER — ANTICOAGULATION - WARFARIN VISIT (OUTPATIENT)
Dept: CARDIOLOGY | Facility: HOSPITAL | Age: 74
End: 2024-07-12
Payer: MEDICARE

## 2024-07-12 ENCOUNTER — LAB (OUTPATIENT)
Dept: LAB | Facility: HOSPITAL | Age: 74
End: 2024-07-12
Payer: MEDICARE

## 2024-07-12 VITALS — SYSTOLIC BLOOD PRESSURE: 126 MMHG | HEART RATE: 97 BPM | DIASTOLIC BLOOD PRESSURE: 75 MMHG

## 2024-07-12 DIAGNOSIS — I26.99 PULMONARY EMBOLISM, UNSPECIFIED CHRONICITY, UNSPECIFIED PULMONARY EMBOLISM TYPE, UNSPECIFIED WHETHER ACUTE COR PULMONALE PRESENT (MULTI): Primary | ICD-10-CM

## 2024-07-12 DIAGNOSIS — I26.99 PULMONARY EMBOLISM, OTHER, UNSPECIFIED CHRONICITY, UNSPECIFIED WHETHER ACUTE COR PULMONALE PRESENT (MULTI): ICD-10-CM

## 2024-07-12 DIAGNOSIS — R79.1 ABNORMAL COAGULATION PROFILE: ICD-10-CM

## 2024-07-12 DIAGNOSIS — Z01.818 PREOPERATIVE EXAMINATION: ICD-10-CM

## 2024-07-12 DIAGNOSIS — I26.99 PULMONARY EMBOLISM, UNSPECIFIED CHRONICITY, UNSPECIFIED PULMONARY EMBOLISM TYPE, UNSPECIFIED WHETHER ACUTE COR PULMONALE PRESENT (MULTI): ICD-10-CM

## 2024-07-12 DIAGNOSIS — I80.201: Primary | ICD-10-CM

## 2024-07-12 DIAGNOSIS — I80.201: ICD-10-CM

## 2024-07-12 DIAGNOSIS — Z79.01 CHRONIC ANTICOAGULATION: ICD-10-CM

## 2024-07-12 LAB
APTT PPP: 49 SECONDS (ref 27–38)
B2 GLYCOPROT1 IGA SER-ACNC: 0.6 U/ML
B2 GLYCOPROT1 IGG SER-ACNC: <1.4 U/ML
B2 GLYCOPROT1 IGM SER-ACNC: 1 U/ML
CARDIOLIPIN IGA SERPL-ACNC: 0.5 APL U/ML
CARDIOLIPIN IGG SER IA-ACNC: <1.6 GPL U/ML
CARDIOLIPIN IGM SER IA-ACNC: 0.4 MPL U/ML
HOLD SPECIMEN: NORMAL
INR PPP: 2.8 (ref 0.9–1.1)
POC INR: 2.6
POC PROTHROMBIN TIME: NORMAL
PROTHROMBIN TIME: 31.9 SECONDS (ref 9.8–12.8)

## 2024-07-12 PROCEDURE — 99211 OFF/OP EST MAY X REQ PHY/QHP: CPT

## 2024-07-12 PROCEDURE — 99215 OFFICE O/P EST HI 40 MIN: CPT | Performed by: INTERNAL MEDICINE

## 2024-07-12 PROCEDURE — 86147 CARDIOLIPIN ANTIBODY EA IG: CPT | Performed by: INTERNAL MEDICINE

## 2024-07-12 PROCEDURE — 86146 BETA-2 GLYCOPROTEIN ANTIBODY: CPT | Performed by: INTERNAL MEDICINE

## 2024-07-12 PROCEDURE — 36415 COLL VENOUS BLD VENIPUNCTURE: CPT

## 2024-07-12 PROCEDURE — 85610 PROTHROMBIN TIME: CPT | Performed by: NURSE PRACTITIONER

## 2024-07-12 PROCEDURE — 85610 PROTHROMBIN TIME: CPT | Mod: QW

## 2024-07-12 NOTE — DOCUMENTATION CLARIFICATION NOTE
"    PATIENT:               TALON THOMSON  ACCT #:                  [id number]  MRN:                       [id number]  :                       1950  ADMIT DATE:       2024 10:48 PM  DISCH DATE:        2024 3:22 PM  RESPONDING PROVIDER #:        [id number]          PROVIDER RESPONSE TEXT:    DVT due to holding the coumadin. Its not related to surgery. iTs related to holding coumadin and sub therapeutic INR.    CDI QUERY TEXT:    Clarification        Instruction:    Based on your assessment of the patient and the clinical information, please provide the requested documentation by clicking on the appropriate radio button and enter any additional information if prompted.    Question: Please further clarify if a relationship exists between the RLE DVT and Recent surgery on     When answering this query, please exercise your independent professional judgment. The fact that a question is being asked, does not imply that any particular answer is desired or expected.    The patient's clinical indicators include:  Clinical Information:  \" 73 year old Male with a PMH of HTN, CKD, afib, unprovoked DVT/PE -, on Warfarin- and prostate CA ,s/p chemo & RT who is presented with RLE pain due to new DVT\"    Clinical Indicators: Progress Note 24 by Dr. Nile Maurer:  New DVT - confirmed on RLE venous duplex u/s, likely occurred during the planned hold of warfarin for surgery on , warfarin held -.\"    Treatment:   Heparin 25,000 Units in dextrose 5 percent 250 mL IV, warfarin 10mg po    Risk Factors: Recent surgery on   Options provided:  -- RLE DVT diagnosis  is related or is a complication to recent surgery done on .  -- RLE DVT diagnosis is unrelated to the recent surgery done on   -- Other - I will add my own diagnosis  -- Refer to Clinical Documentation Reviewer    Query created by: Thea Henry on 2024 1:15 PM      Electronically signed by:  NILE MAURER MD 2024 12:58 " PM

## 2024-07-12 NOTE — PROGRESS NOTES
OUTPATIENT FOLLOW-UP -  VASCULAR MEDICINE    DOS: 07/12/2024  Last seen:    06/04/2024    REQUESTING PHYSICIAN:  Dr. eHctor Mejia PCP    REASON FOR FOLLOW-UP:  here for follow up after recent admission right LE DVT    HISTORY OF PRESENT ILLNESS:     72 yo man here for follow up after recent admission right LE DVT. Admitted 7/6-7/10. On admission INR was 1.5. Underwent surgery for:  low back pain and L4 compression fracture. Patient presented for elective MIS L2-3, L4-3 lami, L4 kyphoplasty. -This was done minimally invasively and admitted x 1 day. Restarted warfarin 5 days after surgery (7/1). Rec pre-op were to hold 5 days pre and resume within 2-3 days. 6/30 was seen in the ED for CP - felt to be MS.  7/5 INR was  1.2.  This was after 4 doses by his report. 7/5 developed RLE pain and swelling - in ED US +There is noncompressibility with intraluminal echogenic material and  severely reduced, but preserved flow within the popliteal and  partially visualized posterior tibial and peroneal veins. CT was done 6/30 at the ED visit for CP - neg for PE. INR was 2.3 on 7/10.        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 Remains on warfarin. Las visit was going to start xarelto but this was reportedly too expensive. No bleeding on the warfarin. Pending back surgery June 26 for disc disease, also reports OA and fracture. This is planned for ?overnight but to be done minimally invasively. Unclear how long after th e procedure the AC will need to be interrupted. Reports prostate cancer is stable. Reports off all meds - just followed clinically now.       REVIEW OF SYSTEMS:     weight is stable  No CP, chest pressure  No cough, SOB  +WEBBER with step - not new and unchanged  No BRBPR, melena, hematuria  No bleeding  + edema, + calf pain    PHYSICAL EXAMINATION:   /75 (BP Location: Right arm)   Pulse 97     Gen: Appears well, NAD  Chest: CTA  CVS: regular without murmur or gallop  Ext: no edema,  nontender  Skin: good condition without wounds or lesions  Mood and affect appropriate    ADDITIONAL DATA:   No compression. Calf measurements; R- 44.0 CM L- 41.5 CM.    ASSESSMENT/PLAN:    here for follow up after recent admission right LE DVT - this was following spine surgery. Has a h/o idiopathic PE on long term warfarin. AC was not bridged for the procedure given the remote event. Check APLA for completeness. INR POCT today. Continue to follow up with AMS. Follow up here in Dec as planned.

## 2024-07-12 NOTE — PROGRESS NOTES
Patient identification verified with 2 identifiers.    Location: [facility] [address] [place] [address] [phone] option #2     Referring Physician: DR. LAURENCE PHAM  Enrollment/ Re-enrollment date: 2025   INR Goal: 2.0-3.0  INR monitoring is per AMS protocol.  Anticoagulation Medication: warfarin  Indication: Pulmonary Embolism (PE)    Subjective   Bleeding signs/symptoms: No  Bruising: No   Major bleeding event: No  Thrombosis signs/symptoms: No  Thromboembolic event: No  Missed doses: No  Extra doses: No  Medication changes: No  Dietary changes: No  Change in health: No  Change in activity: No  Alcohol: No  Other concerns: No    Upcoming Procedures:  Does the Patient Have any upcoming procedures that require interruption in anticoagulation therapy? no  Does the patient require bridging? no      Anticoagulation Summary  As of 2024      INR goal:  2.0-3.0   TTR:  49.1% (9.1 mo)   INR used for dosin.60 (2024)   Weekly warfarin total:  52.5 mg               Assessment/Plan   Therapeutic     1. New dose: no change PER DR. PHAM  2. Next INR: 5 DAYS PER DR. PHAM      Education provided to patient during the visit:  Patient instructed to call in interim with questions, concerns and changes.   Patient educated on interactions between medications and warfarin.   Patient educated on dietary consistency in vitamin k consumption.   Patient educated on affects of alcohol consumption while taking warfarin.   Patient educated on signs of bleeding/clotting.   Patient educated on compliance with dosing, follow up appointments, and prescribed plan of care.

## 2024-07-17 ENCOUNTER — ANTICOAGULATION - WARFARIN VISIT (OUTPATIENT)
Dept: CARDIOLOGY | Facility: CLINIC | Age: 74
End: 2024-07-17
Payer: MEDICARE

## 2024-07-17 DIAGNOSIS — I26.99 PULMONARY EMBOLISM, UNSPECIFIED CHRONICITY, UNSPECIFIED PULMONARY EMBOLISM TYPE, UNSPECIFIED WHETHER ACUTE COR PULMONALE PRESENT (MULTI): Primary | ICD-10-CM

## 2024-07-17 LAB
POC INR: 4.6
POC PROTHROMBIN TIME: NORMAL

## 2024-07-17 PROCEDURE — 99211 OFF/OP EST MAY X REQ PHY/QHP: CPT

## 2024-07-17 PROCEDURE — 85610 PROTHROMBIN TIME: CPT | Mod: QW

## 2024-07-17 NOTE — PROGRESS NOTES
Patient identification verified with 2 identifiers.    Location: [facility] [address] [place] [address] [phone] option #2     Referring Physician: DR. LAURENCE PHAM  Enrollment/ Re-enrollment date: 2025   INR Goal: 2.0-3.0  INR monitoring is per AMS protocol.  Anticoagulation Medication: warfarin  Indication: Pulmonary Embolism (PE)    Subjective   Bleeding signs/symptoms: No  Bruising: No   Major bleeding event: No  Thrombosis signs/symptoms: No  Thromboembolic event: No  Missed doses: No  Extra doses: No  Medication changes: No  Dietary changes: No  Change in health: No  Change in activity: No  Alcohol: No  Other concerns: No    Upcoming Procedures:  Does the Patient Have any upcoming procedures that require interruption in anticoagulation therapy? no  Does the patient require bridging? no      Anticoagulation Summary  As of 2024      INR goal:  2.0-3.0   TTR:  48.5% (9.3 mo)   INR used for dosin.60 (2024)   Weekly warfarin total:  45 mg               Assessment/Plan   Supra therapeutic  Hold warfarin today and tomorrow  Decrease weekly dose per protocol  RTC in 1 week        Education provided to patient during the visit:  Patient instructed to call in interim with questions, concerns and changes.   Patient educated on interactions between medications and warfarin.   Patient educated on dietary consistency in vitamin k consumption.   Patient educated on affects of alcohol consumption while taking warfarin.   Patient educated on signs of bleeding/clotting.   Patient educated on compliance with dosing, follow up appointments, and prescribed plan of care.

## 2024-07-24 ENCOUNTER — ANTICOAGULATION - WARFARIN VISIT (OUTPATIENT)
Dept: CARDIOLOGY | Facility: CLINIC | Age: 74
End: 2024-07-24
Payer: MEDICARE

## 2024-07-24 DIAGNOSIS — I26.99 PULMONARY EMBOLISM, UNSPECIFIED CHRONICITY, UNSPECIFIED PULMONARY EMBOLISM TYPE, UNSPECIFIED WHETHER ACUTE COR PULMONALE PRESENT (MULTI): Primary | ICD-10-CM

## 2024-07-24 LAB
POC INR: 3.1
POC PROTHROMBIN TIME: NORMAL

## 2024-07-24 PROCEDURE — 85610 PROTHROMBIN TIME: CPT | Mod: QW

## 2024-07-24 PROCEDURE — 99211 OFF/OP EST MAY X REQ PHY/QHP: CPT

## 2024-07-24 NOTE — PROGRESS NOTES
Patient identification verified with 2 identifiers.    Location: [email or web address] at [facility] - [address] [address] [phone] option #1     Referring Physician: LAURENCE PHAM  Enrollment/ Re-enrollment date: 2/22/2025   INR Goal: 2.0-3.0  INR monitoring is per AMS protocol.  Anticoagulation Medication: warfarin  Indication: Pulmonary Embolism (PE)    Subjective   Bleeding signs/symptoms: No    Bruising: No   Major bleeding event: No  Thrombosis signs/symptoms: No  Thromboembolic event: No  Missed doses: No  Extra doses: No  Medication changes: No  Dietary changes: No  Change in health: No  Change in activity: No  Alcohol: No  Other concerns: No    Upcoming Procedures:  Does the Patient Have any upcoming procedures that require interruption in anticoagulation therapy? no  Does the patient require bridging? no      Anticoagulation Summary  As of 7/24/2024      INR goal:  2.0-3.0   TTR:  47.4% (9.5 mo)   INR used for dosing:  3.10 (7/24/2024)   Weekly warfarin total:  41.25 mg               Assessment/Plan   Supratherapeutic     1. New dose:  DOSE DECREASED TODAY TO 6/14 LEVEL     2. Next INR: 1 week      Education provided to patient during the visit:  Patient instructed to call in interim with questions, concerns and changes.   Patient educated on interactions between medications and warfarin.   Patient educated on dietary consistency in vitamin k consumption.   Patient educated on affects of alcohol consumption while taking warfarin.   Patient educated on signs of bleeding/clotting.   Patient educated on compliance with dosing, follow up appointments, and prescribed plan of care.

## 2024-08-01 NOTE — PROGRESS NOTES
Louis Rainey is a 73 year old man here for  6 weeks post op s/p minimally invasive L2-3 and L3-4 laminectomy, partial medial facetectomies bialteral and kyphoplasty done 6/26/24 for L4 compression fracture and spinal stenosis of lumbar region with neurogenic claudication. Pt developed LE DVT after discharge and is back on anticoagulation (coumadin).     The heaviness and numbness in his legs is better, but he still has significant low back pain with walking and standing. He has full strength everywhere on exam. His incisions have healed nicely. I ordered and personally reviewed upright xrays of the L spine, which shows stable fracture s/p kyphoplasty and stable alignment without any signs of instability.    I ordered physical therapy and referred him to pain management.     Gio Atkinson MD

## 2024-08-02 ENCOUNTER — ANTICOAGULATION - WARFARIN VISIT (OUTPATIENT)
Dept: CARDIOLOGY | Facility: CLINIC | Age: 74
End: 2024-08-02
Payer: MEDICARE

## 2024-08-02 DIAGNOSIS — I26.99 PULMONARY EMBOLISM, UNSPECIFIED CHRONICITY, UNSPECIFIED PULMONARY EMBOLISM TYPE, UNSPECIFIED WHETHER ACUTE COR PULMONALE PRESENT (MULTI): Primary | ICD-10-CM

## 2024-08-02 LAB
POC INR: 3.3
POC PROTHROMBIN TIME: NORMAL

## 2024-08-02 PROCEDURE — 99211 OFF/OP EST MAY X REQ PHY/QHP: CPT

## 2024-08-02 PROCEDURE — 85610 PROTHROMBIN TIME: CPT | Mod: QW

## 2024-08-02 NOTE — PROGRESS NOTES
Patient identification verified with 2 identifiers.    Location: [facility] at [facility] - [address] [address] [phone] option #1     Referring Physician: DR. LAURENCE PHAM  Enrollment/ Re-enrollment date: 2/22/2025   INR Goal: 2.0-3.0  INR monitoring is per AMS protocol.  Anticoagulation Medication: warfarin  Indication: Pulmonary Embolism (PE)    Subjective   Bleeding signs/symptoms: No  Bruising: No   Major bleeding event: No  Thrombosis signs/symptoms: No  Thromboembolic event: No  Missed doses: No  Extra doses: No  Medication changes: No  Dietary changes: No  Change in health: No  Change in activity: No  Alcohol: No  Other concerns: No    Upcoming Procedures:  Does the Patient Have any upcoming procedures that require interruption in anticoagulation therapy? no  Does the patient require bridging? no      Anticoagulation Summary  As of 8/2/2024      INR goal:  2.0-3.0   TTR:  46.0% (9.8 mo)   INR used for dosing:  3.30 (8/2/2024)   Weekly warfarin total:  37.5 mg               Assessment/Plan   Supratherapeutic     1. New dose: DECREASE TWD BY APPROXIMATELY 5% PER PROTOCOL   2. Next INR: 1 week      Education provided to patient during the visit:  Patient instructed to call in interim with questions, concerns and changes.   Patient educated on interactions between medications and warfarin.   Patient educated on dietary consistency in vitamin k consumption.   Patient educated on affects of alcohol consumption while taking warfarin.   Patient educated on signs of bleeding/clotting.   Patient educated on compliance with dosing, follow up appointments, and prescribed plan of care.

## 2024-08-06 ENCOUNTER — HOSPITAL ENCOUNTER (OUTPATIENT)
Dept: RADIOLOGY | Facility: CLINIC | Age: 74
Discharge: HOME | End: 2024-08-06
Payer: MEDICARE

## 2024-08-06 ENCOUNTER — OFFICE VISIT (OUTPATIENT)
Dept: NEUROSURGERY | Facility: CLINIC | Age: 74
End: 2024-08-06
Payer: MEDICARE

## 2024-08-06 VITALS
SYSTOLIC BLOOD PRESSURE: 129 MMHG | HEIGHT: 72 IN | WEIGHT: 214 LBS | DIASTOLIC BLOOD PRESSURE: 83 MMHG | TEMPERATURE: 98.3 F | BODY MASS INDEX: 28.99 KG/M2 | HEART RATE: 51 BPM

## 2024-08-06 DIAGNOSIS — S32.040D COMPRESSION FRACTURE OF L4 VERTEBRA WITH ROUTINE HEALING, SUBSEQUENT ENCOUNTER: ICD-10-CM

## 2024-08-06 DIAGNOSIS — M48.062 NEUROGENIC CLAUDICATION DUE TO LUMBAR SPINAL STENOSIS: ICD-10-CM

## 2024-08-06 PROCEDURE — 72100 X-RAY EXAM L-S SPINE 2/3 VWS: CPT

## 2024-08-06 PROCEDURE — 99211 OFF/OP EST MAY X REQ PHY/QHP: CPT | Performed by: NEUROLOGICAL SURGERY

## 2024-08-06 ASSESSMENT — PAIN SCALES - GENERAL: PAINLEVEL: 2

## 2024-08-06 NOTE — Clinical Note
I don't have a lot of other options for this анна. Please get him in and see what else you can do for him. Can repeat MRI if PT and your interventions don't work. Thanks!

## 2024-08-08 ENCOUNTER — TELEPHONE (OUTPATIENT)
Dept: RADIATION ONCOLOGY | Facility: HOSPITAL | Age: 74
End: 2024-08-08
Payer: MEDICARE

## 2024-08-08 NOTE — TELEPHONE ENCOUNTER
Called pt. to remind of appointment on 8/14/2024 at 2:30 pm with ADAM Navarrete.  Pt answered and confirmed appointment.

## 2024-08-09 ENCOUNTER — ANTICOAGULATION - WARFARIN VISIT (OUTPATIENT)
Dept: CARDIOLOGY | Facility: CLINIC | Age: 74
End: 2024-08-09
Payer: MEDICARE

## 2024-08-09 DIAGNOSIS — I26.99 PULMONARY EMBOLISM, UNSPECIFIED CHRONICITY, UNSPECIFIED PULMONARY EMBOLISM TYPE, UNSPECIFIED WHETHER ACUTE COR PULMONALE PRESENT (MULTI): Primary | ICD-10-CM

## 2024-08-09 LAB
POC INR: 3.7
POC PROTHROMBIN TIME: NORMAL

## 2024-08-09 PROCEDURE — 99211 OFF/OP EST MAY X REQ PHY/QHP: CPT

## 2024-08-09 PROCEDURE — 85610 PROTHROMBIN TIME: CPT | Mod: QW

## 2024-08-09 NOTE — PROGRESS NOTES
Patient identification verified with 2 identifiers.    Location: [facility] at [facility] - [address] [address] [phone] option #1     Referring Physician: DR. LAURENCE PHAM  Enrollment/ Re-enrollment date: 2/22/2025   INR Goal: 2.0-3.0  INR monitoring is per AMS protocol.  Anticoagulation Medication: warfarin  Indication: Pulmonary Embolism (PE)    Subjective   Bleeding signs/symptoms: No  Bruising: No   Major bleeding event: No  Thrombosis signs/symptoms: No  Thromboembolic event: No  Missed doses: No  Extra doses: No  Medication changes: No  Dietary changes: No  Change in health: No  Change in activity: No  Alcohol: No  Other concerns: No    Upcoming Procedures:  Does the Patient Have any upcoming procedures that require interruption in anticoagulation therapy? no  Does the patient require bridging? no      Anticoagulation Summary  As of 8/9/2024      INR goal:  2.0-3.0   TTR:  44.9% (10 mo)   INR used for dosing:  3.70 (8/9/2024)   Weekly warfarin total:  33.75 mg               Assessment/Plan   Supratherapeutic     1. New dose: HOLD WARFARIN DOSE TODAY (8/9) ONLY AND DECREASE TWD BY APPROXIMATELY 10% PER PROTOCOL  2. Next INR: 1 week      Education provided to patient during the visit:  Patient instructed to call in interim with questions, concerns and changes.   Patient educated on interactions between medications and warfarin.   Patient educated on dietary consistency in vitamin k consumption.   Patient educated on affects of alcohol consumption while taking warfarin.   Patient educated on signs of bleeding/clotting.   Patient educated on compliance with dosing, follow up appointments, and prescribed plan of care.

## 2024-08-14 ENCOUNTER — LAB (OUTPATIENT)
Dept: LAB | Facility: HOSPITAL | Age: 74
End: 2024-08-14
Payer: MEDICARE

## 2024-08-14 ENCOUNTER — DOCUMENTATION (OUTPATIENT)
Dept: RADIATION ONCOLOGY | Facility: HOSPITAL | Age: 74
End: 2024-08-14

## 2024-08-14 ENCOUNTER — HOSPITAL ENCOUNTER (OUTPATIENT)
Dept: RADIATION ONCOLOGY | Facility: HOSPITAL | Age: 74
Setting detail: RADIATION/ONCOLOGY SERIES
Discharge: HOME | End: 2024-08-14
Payer: MEDICARE

## 2024-08-14 VITALS
RESPIRATION RATE: 18 BRPM | BODY MASS INDEX: 29.31 KG/M2 | TEMPERATURE: 97 F | HEART RATE: 66 BPM | DIASTOLIC BLOOD PRESSURE: 81 MMHG | OXYGEN SATURATION: 96 % | WEIGHT: 216.1 LBS | SYSTOLIC BLOOD PRESSURE: 139 MMHG

## 2024-08-14 DIAGNOSIS — C61 MALIGNANT NEOPLASM OF PROSTATE (MULTI): ICD-10-CM

## 2024-08-14 LAB
ALBUMIN SERPL BCP-MCNC: 3.9 G/DL (ref 3.4–5)
ALP SERPL-CCNC: 62 U/L (ref 33–136)
ALT SERPL W P-5'-P-CCNC: 17 U/L (ref 10–52)
ANION GAP SERPL CALC-SCNC: 12 MMOL/L (ref 10–20)
AST SERPL W P-5'-P-CCNC: 25 U/L (ref 9–39)
BASOPHILS # BLD AUTO: 0.03 X10*3/UL (ref 0–0.1)
BASOPHILS NFR BLD AUTO: 0.7 %
BILIRUB SERPL-MCNC: 0.7 MG/DL (ref 0–1.2)
BUN SERPL-MCNC: 15 MG/DL (ref 6–23)
CALCIUM SERPL-MCNC: 9.6 MG/DL (ref 8.6–10.6)
CHLORIDE SERPL-SCNC: 105 MMOL/L (ref 98–107)
CO2 SERPL-SCNC: 27 MMOL/L (ref 21–32)
CREAT SERPL-MCNC: 1.43 MG/DL (ref 0.5–1.3)
EGFRCR SERPLBLD CKD-EPI 2021: 51 ML/MIN/1.73M*2
EOSINOPHIL # BLD AUTO: 0.14 X10*3/UL (ref 0–0.4)
EOSINOPHIL NFR BLD AUTO: 3.1 %
ERYTHROCYTE [DISTWIDTH] IN BLOOD BY AUTOMATED COUNT: 16.6 % (ref 11.5–14.5)
GLUCOSE SERPL-MCNC: 87 MG/DL (ref 74–99)
HCT VFR BLD AUTO: 38.4 % (ref 41–52)
HGB BLD-MCNC: 12.4 G/DL (ref 13.5–17.5)
IMM GRANULOCYTES # BLD AUTO: 0 X10*3/UL (ref 0–0.5)
IMM GRANULOCYTES NFR BLD AUTO: 0 % (ref 0–0.9)
LYMPHOCYTES # BLD AUTO: 1.62 X10*3/UL (ref 0.8–3)
LYMPHOCYTES NFR BLD AUTO: 36.2 %
MCH RBC QN AUTO: 28.8 PG (ref 26–34)
MCHC RBC AUTO-ENTMCNC: 32.3 G/DL (ref 32–36)
MCV RBC AUTO: 89 FL (ref 80–100)
MONOCYTES # BLD AUTO: 0.41 X10*3/UL (ref 0.05–0.8)
MONOCYTES NFR BLD AUTO: 9.2 %
NEUTROPHILS # BLD AUTO: 2.28 X10*3/UL (ref 1.6–5.5)
NEUTROPHILS NFR BLD AUTO: 50.8 %
NRBC BLD-RTO: 0 /100 WBCS (ref 0–0)
PLATELET # BLD AUTO: 208 X10*3/UL (ref 150–450)
POTASSIUM SERPL-SCNC: 4.2 MMOL/L (ref 3.5–5.3)
PROT SERPL-MCNC: 7.4 G/DL (ref 6.4–8.2)
RBC # BLD AUTO: 4.31 X10*6/UL (ref 4.5–5.9)
SODIUM SERPL-SCNC: 140 MMOL/L (ref 136–145)
TESTOST SERPL-MCNC: 388 NG/DL (ref 240–1000)
WBC # BLD AUTO: 4.5 X10*3/UL (ref 4.4–11.3)

## 2024-08-14 PROCEDURE — 84075 ASSAY ALKALINE PHOSPHATASE: CPT

## 2024-08-14 PROCEDURE — 36415 COLL VENOUS BLD VENIPUNCTURE: CPT

## 2024-08-14 PROCEDURE — 99214 OFFICE O/P EST MOD 30 MIN: CPT

## 2024-08-14 PROCEDURE — 82947 ASSAY GLUCOSE BLOOD QUANT: CPT

## 2024-08-14 PROCEDURE — 84403 ASSAY OF TOTAL TESTOSTERONE: CPT

## 2024-08-14 PROCEDURE — 85025 COMPLETE CBC W/AUTO DIFF WBC: CPT

## 2024-08-14 PROCEDURE — 84154 ASSAY OF PSA FREE: CPT

## 2024-08-14 ASSESSMENT — ENCOUNTER SYMPTOMS
ANAL BLEEDING: 0
CHEST TIGHTNESS: 0
DYSURIA: 0
FEVER: 0
PSYCHIATRIC NEGATIVE: 1
BLOOD IN STOOL: 0
WEAKNESS: 0
LOSS OF SENSATION IN FEET: 0
CONSTIPATION: 0
PALPITATIONS: 0
DIZZINESS: 0
DIFFICULTY URINATING: 0
OCCASIONAL FEELINGS OF UNSTEADINESS: 0
COUGH: 0
ALLERGIC/IMMUNOLOGIC NEGATIVE: 1
BACK PAIN: 0
ABDOMINAL PAIN: 0
ARTHRALGIAS: 0
JOINT SWELLING: 0
RECTAL PAIN: 0
UNEXPECTED WEIGHT CHANGE: 0
HEMATURIA: 0
DIARRHEA: 0
FATIGUE: 0
SHORTNESS OF BREATH: 0
DEPRESSION: 0
FREQUENCY: 0

## 2024-08-14 ASSESSMENT — PAIN SCALES - GENERAL: PAINLEVEL: 3

## 2024-08-14 NOTE — RESEARCH NOTES
STUDY: [id number]  VISIT: 12M [facility]    Clinical Research Specialist saw patient in clinic today.    Adverse Events and Concomitant Medications assessed.    QOLs completed by patient with assistance. Patient left glasses at home & requested questions be read to him.    Next appointment made with patient. Contact information provided.    All questions answered to patient satisfaction.    Radha Stroud  08/14/2024

## 2024-08-14 NOTE — PROGRESS NOTES
Cancer synopsis:  Rad/onc: Karissa Rivera/ ADAM Navarrete  Med/onc: Dr. Marques/ ADAM Soler     Prostate Cancer - Local  Treatment  -Initial PSA 7.87, prostate biopsy consistent with Pioche 4+5 = 9, grade group 5, MRI February 2023 no evidence of extracapsular extension or lymph node involvement, PET PSMA negative for distant metastases  -Potential discussion of [facility] 1821, use of SBRT with abiraterone, ADT, PARP inhibition    - 3/2/23 C1D1 on above trial    - Completed RT to prostate SV and LN on 5/15/23     History of presenting illness:    Patient ID: [id number]     Louis Rainey is a 73 y.o. male who presents to me for his prostate cancer GS 4+5=9, IPSA 7.87 now s/p RT. Enrolled in [id number]    RT Site: prostate and SV  RT Date: 05/15/2023  Hormone therapy: Yes, AA/p, ADT, and naraparib. ([id number] protocol), done 09/2023  Hot Flushes: Denies.  Fatigue: Denies  Bone pain: Admits to lower back pain that has been now treated with surgery however remains. Was ordered PT and pain management by nrsgy has PT scheduled.  ED: Admits to loss of sexual since use of systemic therapy. Drive is still low per patient.  - Quality of erections during the last 4 weeks: 1 = None at all  - Use of erectile dysfunction medications:  None  IPSS: 13  Urinary symptoms: Denies dysuria, hematuria or urine leakage. Denies incomplete bladder or frequency. Does report some urgency at times most notably with ETOH consumption and increased fluid intake. Denies dysuria, hematuria, or urine leakage. Nocturia 2-6 times a night. Does report some urgency that has been ongoing for several months now. Delighted with QOL in regards to urination.   Urinary Medications: No  Rectal bleeding: Denies  Colonoscopy: 03/2015 multiple polyps removed next due now (five years late at this time)  Other systems: Denies SOB, CP or fever.    Review of systems:  Review of Systems   Constitutional:  Negative for fatigue, fever and unexpected weight change.   Respiratory:   Negative for cough, chest tightness and shortness of breath.    Cardiovascular:  Negative for chest pain, palpitations and leg swelling.   Gastrointestinal:  Negative for abdominal pain, anal bleeding, blood in stool, constipation, diarrhea and rectal pain.   Endocrine: Negative for cold intolerance, heat intolerance and polyuria.   Genitourinary:  Negative for decreased urine volume, difficulty urinating, dysuria, frequency, hematuria and urgency.        Refer to HPI   Musculoskeletal:  Negative for arthralgias, back pain, gait problem and joint swelling.   Skin: Negative.    Allergic/Immunologic: Negative.    Neurological:  Negative for dizziness, syncope and weakness.   Psychiatric/Behavioral: Negative.       PERFORMANCE STATUS:  KPS/ECO, Normal activity with effort; some signs or symptoms of disease (ECOG equivalent 1)    Past Medical history  Past Medical History:   Diagnosis Date    Anemia     Arrhythmia     SVT/ PVCs-([facility] Feb-2022), AFIB    Arthritis     Forrest's esophagus     CKD (chronic kidney disease)     stage III    Erectile dysfunction     GERD (gastroesophageal reflux disease)     HTN (hypertension)     Low back pain     Lumbar compression fracture (Multi)     Lumbar spondylosis     PE (pulmonary thromboembolism) (Multi)     on Coumadin    Prostate cancer (Multi)     s/p RT follows with Paul Navarrete, CNP    Spinal stenosis     Syncope       Surgical/family history  Family History   Problem Relation Name Age of Onset    Cancer Mother      Heart attack Father        Past Surgical History:   Procedure Laterality Date    COLONOSCOPY      ESOPHAGOGASTRODUODENOSCOPY      OTHER SURGICAL HISTORY  2019    Knee surgery    OTHER SURGICAL HISTORY  2019    Shoulder surgery    OTHER SURGICAL HISTORY  2019    Gallbladder surgery      Social History  Tobacco Use: Medium Risk (2024)    Patient History     Smoking Tobacco Use: Former     Smokeless Tobacco Use: Never      Passive Exposure: Past       Current med list:  Current Outpatient Medications   Medication Instructions    amLODIPine (NORVASC) 5 mg, oral, Daily    calcium carbonate (Oscal) 500 mg calcium (1,250 mg) tablet 1 tablet, oral, Daily    cholecalciferol (Vitamin D-3) 10 mcg (400 unit) capsule 1 capsule, oral, Daily    cyanocobalamin (VITAMIN B-12) 1,000 mcg, oral, Daily    multivitamin tablet 1 tablet, oral, Daily    warfarin (Coumadin) 7.5 mg tablet Take as directed per After Visit Summary.      Last recorded vital:  /81   Pulse 66   Temp 36.1 °C (97 °F) (Temporal)   Resp 18   Wt 98 kg (216 lb 1.6 oz)   SpO2 96%   BMI 29.31 kg/m²     Physical exam  Physical Exam  Constitutional:       Appearance: Normal appearance.   Cardiovascular:      Rate and Rhythm: Normal rate.   Pulmonary:      Effort: Pulmonary effort is normal.      Breath sounds: Normal breath sounds.   Musculoskeletal:         General: Normal range of motion.      Cervical back: Normal range of motion.   Neurological:      Mental Status: He is alert and oriented to person, place, and time.   Psychiatric:         Mood and Affect: Mood normal.         Behavior: Behavior normal.         Thought Content: Thought content normal.         Judgment: Judgment normal.         Pertinent labs:  Prostate Specific AG   Date/Time Value Ref Range Status   05/14/2024 02:39 PM <0.10 <=4.00 ng/mL Final     PSA   Date/Time Value Ref Range Status   05/14/2024 02:39 PM <0.1 0.0 - 4.0 ng/mL Final     Comment:     INTERPRETIVE INFORMATION: Prostate Specific Antigen    The Roche PSA electrochemiluminescent immunoassay is used. Results   obtained with different test methods or kits cannot be used   interchangeably. The Roche PSA method is approved for use as an   aid in the detection of prostate cancer when used in conjunction   with a digital rectal exam in individuals with a prostate age 50   years and older. The Roche PSA is also indicated for the serial   measurement  of PSA to aid in the prognosis and management of   prostate cancer patients. Elevated PSA concentrations can only   suggest the presence of prostate cancer until biopsy is performed.   PSA concentrations can also be elevated in benign prostatic   hyperplasia or inflammatory conditions of the prostate. PSA is   generally not elevated in healthy individuals or individuals with   nonprostatic carcinoma.     Dx:  Problem List Items Addressed This Visit    None  Discussed need for labs today per [id number]. Review of latent SE including rectal bleeding, hematuria, urinary strictures, ED where reviewed as well as how to contact office if s/s present. Denies latent SE. Current ED related to low testerone from systemic therapy and will re-asses with rise into normal of testerone. NCCN guidelines where reviewed and routine FUV of every 3m for first year and every 6m for four years for a total of five years was discussed. Patient verbalized understanding.      Recommend vitamin D supplementation, start (or increase by) 400-1000 units daily. Reviewed importance of intake while on Testerone lowering therapy as well as after until Testerone re-establishes, at which point may stop if DEXA indicative of normal bone density. Also reviewed that individuals at a higher risk such as those over the age of 75 or those with a hx of bone fx, osteoporosis or osteopenia to continue supplementation indefinitely with routine DEXA imaging as per national guidelines to assess bone health continually.    Discussed pelvis floor exercise to aid with urinary urgency.     Reviewed recent DEXA indicating no osteopenia or osteoporosis.    Reviewed guidelines for colonoscopy. Consider having done now    PLAN:  FUV 6m  Labs per [facility]RYST[facility] 1821 (CBC, testerone/PSA,cmp)  Screening: Have colonoscopy done  FUV other providers: PCP for routine evals, Nrsgy for L4 compression fx surgery follow up and management, PT and pain management for current lower back  pain.    Please contact office with any concerns:  Paul Cannon CNP  [phone]

## 2024-08-15 ENCOUNTER — EVALUATION (OUTPATIENT)
Dept: PHYSICAL THERAPY | Facility: CLINIC | Age: 74
End: 2024-08-15
Payer: MEDICARE

## 2024-08-15 DIAGNOSIS — M48.062 NEUROGENIC CLAUDICATION DUE TO LUMBAR SPINAL STENOSIS: ICD-10-CM

## 2024-08-15 DIAGNOSIS — M54.9 BACK PAIN: Primary | ICD-10-CM

## 2024-08-15 PROCEDURE — 97161 PT EVAL LOW COMPLEX 20 MIN: CPT | Mod: GP

## 2024-08-15 PROCEDURE — 97110 THERAPEUTIC EXERCISES: CPT | Mod: GP

## 2024-08-15 ASSESSMENT — ENCOUNTER SYMPTOMS
DEPRESSION: 0
OCCASIONAL FEELINGS OF UNSTEADINESS: 0
LOSS OF SENSATION IN FEET: 0

## 2024-08-15 ASSESSMENT — PAIN - FUNCTIONAL ASSESSMENT: PAIN_FUNCTIONAL_ASSESSMENT: 0-10

## 2024-08-15 NOTE — DISCHARGE SUMMARY
Discharge Diagnosis  Acute deep vein thrombosis (DVT) of popliteal vein of right lower extremity (Multi)    Issues Requiring Follow-Up  N/A    Discharge Meds     Your medication list        CHANGE how you take these medications        Instructions Last Dose Given Next Dose Due   warfarin 7.5 mg tablet  Commonly known as: Coumadin  What changed:   how much to take  how to take this  when to take this  additional instructions      Take as directed. If you are unsure how to take this medication, talk to your nurse or doctor.  Original instructions: Take as directed per After Visit Summary.              CONTINUE taking these medications        Instructions Last Dose Given Next Dose Due   amLODIPine 5 mg tablet  Commonly known as: Norvasc           calcium carbonate 500 mg calcium (1,250 mg) tablet  Commonly known as: Oscal           cholecalciferol 400 unit capsule  Commonly known as: Vitamin D-3           cyanocobalamin 1,000 mcg tablet  Commonly known as: Vitamin B-12           multivitamin tablet                  STOP taking these medications      acetaminophen 325 mg tablet  Commonly known as: Tylenol        cyclobenzaprine 5 mg tablet  Commonly known as: Flexeril        oxyCODONE 5 mg immediate release tablet  Commonly known as: Roxicodone        polyethylene glycol 17 gram packet  Commonly known as: Glycolax, Miralax        predniSONE 20 mg tablet  Commonly known as: Deltasone        sennosides-docusate sodium 8.6-50 mg tablet  Commonly known as: Mary Ann-Colace                 Test Results Pending At Discharge  N/A    Hospital Course    Mr. Rainey is a 73 year old Male with a PMH of HTN, CKD, afib, unprovoked DVT/PE (2013, on Warfarin) and prostate CA (s/p chemo & RT) who is presented with RLE pain due to new DVT. New DVT (confirmed on RLE venous duplex u/s) likely occurred during the planned hold of warfarin for surgery on 6/26 (warfarin held 6/21-7/1). Subtherapeutic INR 1.3 -> 1.5 between 6/30 and 7/6. CT  angio 6/30 negative for PE, low suspicion for PE at this time as patient is not SOB and has stable vital signs. CTPE negative for PE.     New Nearly Occlusive RLE DVT  Hx Unprovoked PE (2013)  Continue coumadin   Takes 7.5 mg daily   Monitor INR,  ( Can DC Heparin once INR is > 2.0)  Follow up in vascular medicine,     The patient presented with RLE swelling and was found to have a DVT. His warfarin was held briefly for surgery on 6/26. Patient was negative for PE. Coumadin was resumed.    Pertinent Physical Exam At Time of Discharge  GENERAL APPEARANCE: A&Ox3, appears in no acute distress  HEAD: normocephalic, atraumatic  THROAT: Oral cavity and pharynx normal. No inflammation, swelling, exudate, or lesions.  NECK: Neck supple, non-tender without lymphadenopathy, masses or thyromegaly.  CARDIAC: Normal S1 and S2. No S3, S4 or murmurs. Rhythm is regular. There is no peripheral edema, cyanosis or pallor. Extremities are warm and well perfused. No carotid bruits.  LUNGS: Clear to auscultation bilaterally without rales, rhonchi, wheezing or diminished breath sounds.  ABDOMEN: Positive bowel sounds. Soft, nondistended, nontender. No guarding or rebound. No masses.  EXTREMITIES: No significant deformity or joint abnormality. No edema. Peripheral pulses intact. No varicosities.  SKIN: Skin normal color, texture and turgor with no lesions or rash  PSYCHIATRIC: oriented to person, place, and time, good judgement and reason, without hallucinations, abnormal affect or abnormal behaviors during the examination. Patient is not suicidal.        Outpatient Follow-Up  Future Appointments   Date Time Provider Department Center   8/15/2024  1:00 PM Yazmni Casillas, PT GEAWarrPT [facility]   8/16/2024  1:45 PM ANTICOAG [facility] [id number] COAG CLINIC [id number] [facility]   9/10/2024 11:30 AM [facility]   12/3/2024  1:00 PM Marla Vela MD [id number] [facility]   2/11/2025  2:30 PM Paul Navarrete, APRN-CNP [id number]  Academic         Brent Bellamy MD

## 2024-08-15 NOTE — PROGRESS NOTES
Physical Therapy    Physical Therapy Evaluation    Patient Name: Louis Rainey  MRN: [id number]  Today's Date: 8/15/2024    Time Entry:  Time Calculation  Start Time: 1300  Stop Time: 1400  Time Calculation (min): 60 min  PT Evaluation Time Entry  PT Evaluation (Low) Time Entry: 35  PT Therapeutic Procedures Time Entry  Therapeutic Exercise Time Entry: 10                 Visit #1  Insurance; Medicare  Cert; 8/15/2024 - 11/12/2024  Problem List Items Addressed This Visit             ICD-10-CM       Musculoskeletal and Injuries    Back pain - Primary M54.9    Relevant Orders    Follow Up In Physical Therapy       Assessment; Patient presents with chronic lower back following lumbar procedure with laminectomy at L2-3 and L3-4 levels, and structural stabilization of L4 compression fracture. Patient presents with poor forward leaning posture, no knowledge of there ex, limited standing time and tolerance to walk. Patient discouraged that his symptoms did not improve more since surgery, however motivated wit work with PT on improving his standing and walking tolerance.        Plan  Treatment/Interventions: Education/ Instruction, Manual therapy, Mechanical traction, Therapeutic exercises  PT Plan: Skilled PT  Certification Period Start Date: 08/15/24  Certification Period End Date: 11/12/24  Rehab Potential: Fair  Plan of Care Agreement: Patient  Recommended frequency; 1-2 visits x 5-6 visits (total of 10 visits)    Current Problem  1. Back pain  Follow Up In Physical Therapy      2. Neurogenic claudication due to lumbar spinal stenosis  Referral to Physical Therapy    Follow Up In Physical Therapy          Subjective   General:  General  Reason for Referral: PT eval and treat; low back pain  Referred By: Dr Gio Atkinson  Past Medical History Relevant to Rehab: Low back pain with confirmed spinal stenosis with neurogenic claudication (Onset in Novemeber of last year. Had surgery on 6- (lumbar  leminectomy))  General Comment: Had pain for months going into surgery, and could not stand up strainght (Since surgery cannot stand up right and walk length of time, which is what I delt with prior to surgery)  Precautions: NA  Precautions  STEADI Fall Risk Score (The score of 4 or more indicates an increased risk of falling): 0  Vital Signs: NA     Pain:  Pain Assessment: 0-10  0-10 (Numeric) Pain Score:  (When sitting low back pain is minimal, when standing to cook or wash disches, eventually pain forces me to sit, otherwise i would fall)  Pain Type: Chronic pain  Pain Location:  (mostly low back)  Pain Orientation: Lower  Pain Radiating Towards: Pain rarely radiates into thighs  Pain Descriptors:  (burning pain)  Pain Frequency: Intermittent  Pain Onset: Gradual  Clinical Progression:  (Not noticing any change from pior to surgery)  Effect of Pain on Daily Activities: All standing activites are effected (i go to the park, sit in the chair, and talk to guys. Cannot do much in standing)  Patient's Stated Pain Goal:  (I want to be able to walk and stand for periods of time and return to working out. Have not been able to work out in over a year (weights and walking up to 5 miles))  Pain Interventions:  (No taking anything to manage this pain since surgery at this point)  Home Living: lives alone in house     Prior Function Per Pt/Caregiver Report: independent        Objective   Posture: forward leaning from waist, unable to straighten up in standing     Range of Motion: extensions and rotations not test  Lumbar flexion with moderate loss      Strength: Left Quads and hip flexors 4/5 (hx of multiple knee surgeries)                   Left hip abductors and flexors 4+/5                   Right Quads and hip flexors and abductors 4+/5     Flexibility: WNL     Palpation: WNL     Special Tests: NA     Gait: slow fernandez, short steps, reduced trunk dissociation     Outcome Measures:  Modified Oswestry = 36%   30 seconds  sit/stand = 8 (no arm assist)  6 MWT = 475 feet (2 minute seated rest required during test)  OP EDUCATION:  Outpatient Education  Individual(s) Educated: Patient  Education Provided: Body Mechanics, Anatomy, POC, Posture  Diagnosis and Precautions: low back pain/spinal stenosis  Risk and Benefits Discussed with Patient/Caregiver/Other: yes  Patient/Caregiver Demonstrated Understanding: yes  Plan of Care Discussed and Agreed Upon: yes  Patient Response to Education: Patient/Caregiver Verbalized Understanding of Information  Education Comment: HEP    PT intervention;  Hook lying lower trunk rotations  Single knee to chest  Repeated lumbar flexion from lying, RFIL  Repeated lumbar flexion in sitting, RFIS    Goals:  Active       PT Problem       PT Goal 1       Start:  08/15/24    Expected End:  11/12/24       Patient will reduce difficulties related to functional mobilities and quality of life, as evident by Modified Oswestry score reduction by 50%         PT Goal 2       Start:  08/15/24    Expected End:  11/12/24       Patient will report reduced/abolished pain in lower back, indicated by grade of 0-3 on 0-10 pain scale, with common daily activities          PT Goal 3       Start:  08/15/24    Expected End:  11/12/24       Patient will demonstrated improved ROM of  lumbar spine in sagittal and horizontal planes, evident by full functional motion          PT Goal 4       Start:  08/15/24    Expected End:  11/12/24       Demonstrated improved gait speed over 1.1 m/s and tolerance with prolong standing/walking, with 6 MWT over 700 feet         PT Goal 5       Start:  08/15/24    Expected End:  11/12/24       Patient will demonstrated knowledge of HEP, its maintenance frequency, and associated benefits

## 2024-08-16 ENCOUNTER — ANTICOAGULATION - WARFARIN VISIT (OUTPATIENT)
Dept: CARDIOLOGY | Facility: CLINIC | Age: 74
End: 2024-08-16
Payer: MEDICARE

## 2024-08-16 DIAGNOSIS — I26.99 PULMONARY EMBOLISM, UNSPECIFIED CHRONICITY, UNSPECIFIED PULMONARY EMBOLISM TYPE, UNSPECIFIED WHETHER ACUTE COR PULMONALE PRESENT (MULTI): Primary | ICD-10-CM

## 2024-08-16 LAB
POC INR: 3.4
POC PROTHROMBIN TIME: NORMAL

## 2024-08-16 PROCEDURE — 99211 OFF/OP EST MAY X REQ PHY/QHP: CPT

## 2024-08-16 PROCEDURE — 85610 PROTHROMBIN TIME: CPT | Mod: QW

## 2024-08-16 NOTE — PROGRESS NOTES
Patient identification verified with 2 identifiers.    Location: [facility] - [address] [address] [phone] option #1     Referring Physician: Dr. Marla Vela  Enrollment/ Re-enrollment date: 2/22/25   INR Goal: 2.0-3.0  INR monitoring is per AMS protocol.  Anticoagulation Medication: warfarin  Indication: Pulmonary Embolism (PE)    Subjective   Bleeding signs/symptoms: No    Bruising: No   Major bleeding event: No  Thrombosis signs/symptoms: No  Thromboembolic event: No  Missed doses: No  Extra doses: No  Medication changes: No  Dietary changes: Yes  Patient has been taking 1 tablespoon olive oil daily.  Change in health: No  Change in activity: No  Alcohol: No  Other concerns: No    Upcoming Procedures:  Does the Patient Have any upcoming procedures that require interruption in anticoagulation therapy? no  Does the patient require bridging? no      Anticoagulation Summary  As of 8/16/2024      INR goal:  2.0-3.0   TTR:  43.8% (10.3 mo)   INR used for dosing:  3.40 (8/16/2024)   Weekly warfarin total:  33.75 mg               Assessment/Plan   Supratherapeutic     1. New dose:  will decrease weekly dose per protocol.      2. Next INR: 1 week      Education provided to patient during the visit:  Patient instructed to call in interim with questions, concerns and changes.   Patient educated on interactions between medications and warfarin.   Patient educated on dietary consistency in vitamin k consumption.   Patient educated on affects of alcohol consumption while taking warfarin.   Patient educated on signs of bleeding/clotting.   Patient educated on compliance with dosing, follow up appointments, and prescribed plan of care.

## 2024-08-19 ENCOUNTER — TELEPHONE (OUTPATIENT)
Dept: RADIATION ONCOLOGY | Facility: HOSPITAL | Age: 74
End: 2024-08-19
Payer: MEDICARE

## 2024-08-19 NOTE — TELEPHONE ENCOUNTER
Called patient and updated with Psa as <0.10. Testosterone within normal limits. CBC remains midly anemic r/t CKD. Discussed increasing fluids and stable creatinine and GFR rates. Consistent with his known CKD.

## 2024-08-22 ENCOUNTER — TREATMENT (OUTPATIENT)
Dept: PHYSICAL THERAPY | Facility: CLINIC | Age: 74
End: 2024-08-22
Payer: MEDICARE

## 2024-08-22 DIAGNOSIS — M48.062 NEUROGENIC CLAUDICATION DUE TO LUMBAR SPINAL STENOSIS: ICD-10-CM

## 2024-08-22 DIAGNOSIS — M54.9 BACK PAIN: ICD-10-CM

## 2024-08-22 PROCEDURE — 97110 THERAPEUTIC EXERCISES: CPT | Mod: GP,CQ

## 2024-08-22 ASSESSMENT — PAIN - FUNCTIONAL ASSESSMENT: PAIN_FUNCTIONAL_ASSESSMENT: 0-10

## 2024-08-22 ASSESSMENT — PAIN SCALES - GENERAL: PAINLEVEL_OUTOF10: 1

## 2024-08-22 NOTE — PROGRESS NOTES
Physical Therapy    Physical Therapy Evaluation    Patient Name: Louis Rainey  MRN: [id number]  Today's Date: 8/22/2024  Time Entry:  Time Calculation  Start Time: 0325  Stop Time: 0405  Time Calculation (min): 40 min     PT Therapeutic Procedures Time Entry  Therapeutic Exercise Time Entry: 40                 Visit #2  Insurance; Medicare  Cert; 8/15/2024 - 11/12/2024    Problem List Items Addressed This Visit             ICD-10-CM    Back pain M54.9     Other Visit Diagnoses         Codes    Neurogenic claudication due to lumbar spinal stenosis     M48.062            Assessment;  Evaluation/Treatment Tolerance: Patient tolerated treatment well  Bilateral hamstring tightness observed, discomfort at end range/stretching point. Performed repeated therex without increase of low back pain. Additional core strengthening added today, completed without issue and enjoyed overall. Printed HEP and reviewed made modifications. Leaves therapy feeling well.     Plan   Recommended frequency; 1-2 visits x 5-6 visits (total of 10 visits)    Current Problem  1. Neurogenic claudication due to lumbar spinal stenosis  Follow Up In Physical Therapy      2. Back pain  Follow Up In Physical Therapy        Subjective    Minimal camron right now  Most of my pain is when walking or standing for long periods of time  I can stand for around 10 minutes before I need to sit down to relieve pain  HEP Completed twice daily  No other major updates since evaluation    Precautions: NA     Vital Signs: NA     Pain:  Pain Assessment: 0-10  0-10 (Numeric) Pain Score: 1  Home Living: lives alone in house     Prior Function Per Pt/Caregiver Report: independent        Objective   Posture: forward leaning from waist, unable to straighten up in standing     Range of Motion: extensions and rotations not test  Lumbar flexion with moderate loss      Strength: Left Quads and hip flexors 4/5 (hx of multiple knee surgeries)                   Left hip abductors  and flexors 4+/5                   Right Quads and hip flexors and abductors 4+/5     Flexibility: WNL     Palpation: WNL     Special Tests: NA     Gait: slow fernandez, short steps, reduced trunk dissociation     Outcome Measures:  Modified Oswestry = 36%   30 seconds sit/stand = 8 (no arm assist)  6 MWT = 475 feet (2 minute seated rest required during test)  OP EDUCATION:       PT intervention;  Hook lying lower trunk rotations x 30  Single knee to chest stretch 2 x 30 sec each   Double knee to chest 15 x 3 sec  Hip adduction with TA holding 20 x 5 sec   Blue TB hip abduction with TA contraction x 20   Dead bug with cues on TA contraction x 10 each  Crunch x 20  Strap assisted hamstring stretch 2 x 30 sec each  Repeated lumbar flexion in sitting, RFIS    Access Code: [email or web address]  URL: [email or web address]  Date: 08/22/2024  Prepared by: Reymundo Johnson    Exercises  - Dead Bug  - 1 x daily - 7 x weekly - 2 sets - 10 reps  - Seated Hamstring Stretch  - 1 x daily - 7 x weekly - 2 sets - 3 reps - 30 hold  - Seated Flexion Stretch  - 1 x daily - 7 x weekly - 2 sets - 10 reps  - Supine Double Knee to Chest  - 1 x daily - 7 x weekly - 2 sets - 10 reps - 3 hold  - Supine Hip Adduction Isometric with Ball  - 1 x daily - 7 x weekly - 2 sets - 10 reps - 5, contract abs while squeezing ball hold  - Hooklying Clamshell with Resistance  - 1 x daily - 7 x weekly - 2 sets - 10 reps - contract abs when moving legs apart hold    Goals:  Active       PT Problem       PT Goal 1       Start:  08/15/24    Expected End:  11/12/24       Patient will reduce difficulties related to functional mobilities and quality of life, as evident by Modified Oswestry score reduction by 50%         PT Goal 2       Start:  08/15/24    Expected End:  11/12/24       Patient will report reduced/abolished pain in lower back, indicated by grade of 0-3 on 0-10 pain scale, with common daily activities          PT Goal 3       Start:  08/15/24     Expected End:  11/12/24       Patient will demonstrated improved ROM of  lumbar spine in sagittal and horizontal planes, evident by full functional motion          PT Goal 4       Start:  08/15/24    Expected End:  11/12/24       Demonstrated improved gait speed over 1.1 m/s and tolerance with prolong standing/walking, with 6 MWT over 700 feet         PT Goal 5       Start:  08/15/24    Expected End:  11/12/24       Patient will demonstrated knowledge of HEP, its maintenance frequency, and associated benefits

## 2024-08-23 ENCOUNTER — ANTICOAGULATION - WARFARIN VISIT (OUTPATIENT)
Dept: CARDIOLOGY | Facility: CLINIC | Age: 74
End: 2024-08-23
Payer: MEDICARE

## 2024-08-23 DIAGNOSIS — I26.99 PULMONARY EMBOLISM, UNSPECIFIED CHRONICITY, UNSPECIFIED PULMONARY EMBOLISM TYPE, UNSPECIFIED WHETHER ACUTE COR PULMONALE PRESENT (MULTI): ICD-10-CM

## 2024-08-23 LAB
POC INR: 2.4
POC PROTHROMBIN TIME: NORMAL

## 2024-08-23 PROCEDURE — 99211 OFF/OP EST MAY X REQ PHY/QHP: CPT

## 2024-08-23 PROCEDURE — 85610 PROTHROMBIN TIME: CPT | Mod: QW

## 2024-08-23 NOTE — PROGRESS NOTES
Patient identification verified with 2 identifiers.    Location: [facility] at [facility] - [address] [address] [phone] option #1     Referring Physician: Dr. Marla Vela  Enrollment/ Re-enrollment date: 25   INR Goal: 2.0-3.0  INR monitoring is per AMS protocol.  Anticoagulation Medication: warfarin  Indication: Pulmonary Embolism (PE)    Subjective   Bleeding signs/symptoms: No    Bruising: No   Major bleeding event: No  Thrombosis signs/symptoms: No  Thromboembolic event: No  Missed doses: No  Extra doses: No  Medication changes: No  Dietary changes: No  Change in health: No  Change in activity: No  Alcohol: No  Other concerns: No    Upcoming Procedures:  Does the Patient Have any upcoming procedures that require interruption in anticoagulation therapy? no  Does the patient require bridging? no      Anticoagulation Summary  As of 2024      INR goal:  2.0-3.0   TTR:  44.2% (10.5 mo)   INR used for dosin.40 (2024)   Weekly warfarin total:  30 mg               Assessment/Plan   Therapeutic     1. New dose: no change    2. Next INR: 1 week      Education provided to patient during the visit:  Patient instructed to call in interim with questions, concerns and changes.   Patient educated on interactions between medications and warfarin.   Patient educated on dietary consistency in vitamin k consumption.   Patient educated on affects of alcohol consumption while taking warfarin.   Patient educated on signs of bleeding/clotting.   Patient educated on compliance with dosing, follow up appointments, and prescribed plan of care.

## 2024-08-27 ENCOUNTER — OFFICE VISIT (OUTPATIENT)
Dept: PAIN MEDICINE | Facility: HOSPITAL | Age: 74
End: 2024-08-27
Payer: MEDICARE

## 2024-08-27 DIAGNOSIS — M48.062 NEUROGENIC CLAUDICATION DUE TO LUMBAR SPINAL STENOSIS: ICD-10-CM

## 2024-08-27 PROCEDURE — 99214 OFFICE O/P EST MOD 30 MIN: CPT | Performed by: ANESTHESIOLOGY

## 2024-08-27 ASSESSMENT — PAIN SCALES - GENERAL: PAINLEVEL: 7

## 2024-08-27 NOTE — PROGRESS NOTES
Chief Complaint   Patient presents with    Back Pain     Subjective   Patient ID: Louis Rainey is a 73 y.o. male with a past medical history of hypertension, DVT/PE, L4 vertebral compression fracture status post kyphoplasty, prostate cancer status post chemoradiation who presents as a new patient referred for chronic back pain.     HPI:   Louis Rainey is a 73-year-old male with a past medical history of hypertension, DVT/PE on Coumadin, L4 vertebral compression fracture status post minimally invasive L2-L3 and L3-L4 laminectomy, bilateral partial medial facetectomies and kyphoplasty done on June 26, 2024 with Dr. Atkinson with neurosurgical spine.  The patient presents as a new patient referred for persistent axial back pain.  Patient has previously seen Dr. Elieser Verma most recently in March 2024 he had a L4-L5 interlaminar lumbar epidural steroid injection that helped with his pain for about 1 day.  After this injection he was sent for a lumbar spine MRI which demonstrated an acute L4 compression fracture.  He then saw Dr. Atkinson and had surgery in June 2024.  After the surgery he unfortunately developed a DVT and was restarted back on Coumadin.  He has a history of PE in the past and was on Coumadin prior to the surgery and was off of the Coumadin for 5 days for the surgery and developed a DVT.  The patient only recently started physical therapy last week after his surgery.  Patient reports that he has persistent axial back pain that is described as burning pain located in his back and on both sides of his back however does not radiate anywhere else.  Patient reports that the pain last week was severe 10 out of 10 pain and he was only able to walk a few steps.  However with physical therapy he believes that his pain is improving and that he is able to walk slightly further.  He is not taking anything for the pain currently.  Patient reports he has not had any falls or trauma since surgery.  In terms of his  compression fracture the patient denies any history of falls in the past and was not given a reason as to why he had the compression fracture.  Patient reports that his doctor that was following him for a prostate cancer in the past has done a DEXA scan that was normal.  We discussed with the patient that he should follow-up with his primary care physician and should get a further workup as to why he had a vertebral compression fracture and get treated for that.  Currently patient denies any chest pain or shortness of breath or fevers or chills.  He denies any bowel or bladder incontinence or saddle anesthesia.  He denies any lower extremity weakness.  He reports that he is now able to walk a few minutes up to maybe a block without having significant pain and having to stop.  Reports that the pain is better when he is sitting or laying down and worse when he is standing or moving around.    Physical Therapy:  Patient is currently in physical therapy and has only undergone 2 sessions so far .  Patient notes improvement already.        Review of Systems   13-point ROS done and negative except for HPI.     Current Outpatient Medications   Medication Instructions    amLODIPine (NORVASC) 5 mg, oral, Daily    calcium carbonate (Oscal) 500 mg calcium (1,250 mg) tablet 1 tablet, oral, Daily    cholecalciferol (Vitamin D-3) 10 mcg (400 unit) capsule 1 capsule, oral, Daily    cyanocobalamin (VITAMIN B-12) 1,000 mcg, oral, Daily    multivitamin tablet 1 tablet, oral, Daily    warfarin (Coumadin) 7.5 mg tablet Take as directed per After Visit Summary.       Past Medical History:   Diagnosis Date    Anemia     Arrhythmia     SVT/ PVCs-(Min Feb-March2022), AFIB    Arthritis     Forrest's esophagus     CKD (chronic kidney disease)     stage III    Erectile dysfunction     GERD (gastroesophageal reflux disease)     HTN (hypertension)     Low back pain     Lumbar compression fracture (Multi)     Lumbar spondylosis     PE  (pulmonary thromboembolism) (Multi) 2013    on Coumadin    Prostate cancer (Multi)     s/p RT follows with Paul Navarrete CNP    Spinal stenosis     Syncope         Past Surgical History:   Procedure Laterality Date    COLONOSCOPY      ESOPHAGOGASTRODUODENOSCOPY      OTHER SURGICAL HISTORY  04/23/2019    Knee surgery    OTHER SURGICAL HISTORY  04/23/2019    Shoulder surgery    OTHER SURGICAL HISTORY  04/23/2019    Gallbladder surgery        Family History   Problem Relation Name Age of Onset    Cancer Mother      Heart attack Father          Allergies   Allergen Reactions    Cefazolin Sodium Unknown    Cephalexin Unknown    Cephalosporins Unknown     states was mild and it was 20 years ago        Objective     There were no vitals filed for this visit.     Physical Exam  General: NAD, well groomed, well nourished  Eyes: Non-icteric sclera, EOMI  Ears, Nose, Mouth, and Throat: External ears and nose appear to be without deformity or rash. No lesions or masses noted. Hearing is grossly intact.   Neck: Trachea midline  Respiratory: Nonlabored breathing   Cardiovascular: trace peripheral edema   Skin: No rashes or open lesions/ulcers identified on skin.    Back:   Palpation: Tenderness to palpation of lumbar paraspinal region  Surgical incision well-healed no tenderness out of proportion or erythema or fluctuance noted    Neurologic:   Cranial nerves grossly intact.   Strength 5/5 and symmetric plantar/dorsiflexion   Sensation: Normal to light touch throughout intact throughout.  DTRs:normal and symmetric throughout      Psychiatric: Alert, orientation to person, place, and time. Cooperative.    Imaging personally reviewed:   Xray Lumbar Spine 8/6/24):  FINDINGS:  L4 vertebral compression fracture with interval vertebroplasty  grossly satisfactory.      Moderate lumbar degenerative changes with a mild anterolisthesis  L5-S1 with pars defects unchanged.      IMPRESSION:  L4 vertebral compression fracture with interval  vertebroplasty  grossly satisfactory.      Moderate lumbar degenerative changes with a mild anterolisthesis  L5-S1 with pars defects unchanged.    MRI Lumbar Spine (8/6/24):  FINDINGS:  There are 5 lumbar type vertebrae, with numeration in keeping with  prior exams.      Alignment: Minimal grade 1 anterolisthesis of L5 on S1.      Vertebrae/Intervertebral Discs: New compared to prior CT, a  compression deformity of the L4 superior endplate is noted with  associated marrow edema and approximately 40-45% vertebral body  height loss. The marrow edema extends into the posterior elements of  L4. No evidence of retropulsion. The remainder of the vertebral body  heights are intact.      Conus medullaris and Cauda Equina: The conus medullaris terminates at  L1. There is crowding of cauda equina roots at L2-3 and L3-4 as  result of advanced degenerative changes described below.      T10-11: Sagittal images demonstrate no significant central canal or  neural foraminal stenosis.      T11-12: Sagittal images demonstrate no significant central canal or  neural foraminal stenosis.      T12-L1: No significant central canal or neural foraminal stenosis.      L1-L2: Mild bilateral lateral recess stenosis and mild left greater  than right neural foraminal stenosis due to mild circumferential disc  bulge and thickening of the ligamentum flavum.      L2-L3: Near-complete circumferential effacement of the thecal sac as  result of broad-based posterior disc bulge, thickening of the  ligamentum flavum, and facet hypertrophy. Thus, there is severe  central canal stenosis and compression of the cauda equina nerve  roots. Severe lateral recess stenosis. Mild left neural foraminal  stenosis.      L3-L4: Complete circumferential effacement of the thecal sac as  result of broad-based posterior disc bulge, thickening of the  ligamentum flavum, and facet hypertrophy. Thus, there is severe  central canal stenosis and compression of the cauda  equina nerve  roots. Severe lateral recess and neural foraminal stenosis.      L4-5: Moderate central canal stenosis, severe lateral recess  stenosis, and moderate right greater than left neural foraminal  stenosis as result of broad-based posterior disc bulge, thickening of  the ligamentum flavum, and facet hypertrophy.      L5-S1: Moderate lateral recess and moderate to severe neural  foraminal stenosis as result of broad-based posterior disc bulge and  facet joint hypertrophy. Annular fissuring of the intervertebral disc  is also noted (series 9, image 12).      The visualized portions of the sacrum and iliac bones demonstrate no  focal abnormality.      Edema and nonloculated fluid are noted in the posterior paraspinous  soft tissues overlying L4 and L5.          IMPRESSION:  1. New acute to subacute L4 compression fracture with 40-45%  vertebral body height loss. Extension of edema into the L4 posterior  elements and overlying posterior paraspinous soft tissues. No  evidence of retropulsion. Consider CT for evaluation of the osseous  structures if clinically indicated. While findings could be on an  insufficiency basis further follow-up examination to document marrow  signal recovery should be considered if there were clinical suspicion  of an underlying systemic process.  2. Multilevel degenerative changes of the lumbar spine as detailed  above, most severely at L2-3 and L3-4 with severe central canal  stenosis and compression of the cauda equina nerve roots.          In addition to the orange epic alert, findings were relayed via [employer]  secure chat to Dr. ROQUE CHACON at 9:39 am on 4/4/2024 by Radiology  resident Jenna Livingston MD.        Assessment/Plan   Louis Rainey is a 73-year-old male with a past medical history of DVT/PE on Coumadin, hypertension, hyperlipidemia, prostate cancer status post radiation with a history of chronic lower back pain and acute L4 compression fracture who recently  underwent surgical treatment for the compression fracture in addition to L2-L3 and L3-L4 laminectomy with Dr. Atkinson in June 2024.  He presents as a new patient for persistent lower back pain after surgery.  Patient reports axial back pain that is burning in nature and worse with activity.  He is only able to walk a few minutes without having to stop due to back pain.  He is currently not on any medications.  He has only undergone 2 sessions of physical therapy since his surgery and has noted significant improvement even with those 2 physical therapy sessions.  He had a x-ray after his surgery of his lumbar spine that demonstrated appropriate postsurgical changes and no acute concerns on the x-ray.  He has not had any further imaging after his surgery.  The patient is in the acute postoperative phase and has not even completed physical therapy yet he has significant improvement in pain with only 2 sessions of physical therapy.  Thus we will recommend the patient complete physical therapy and in 6 to 8 weeks after physical therapy is completed we can consider further workup or intervention.  We can consider repeating a lumbar spine MRI in the future.  We can also in the future consider injections versus spinal cord stimulation versus intrathecal drug delivery system for persistent axial back pain if the pain is severe enough for the patient and he is able to safely be off of anticoagulation.  If the patient requires any further intervention in the future he will need clearance from his hematologist in terms of being able to stop Coumadin versus a bridge in the perioperative period.  We also discussed with the patient that he should follow-up with his primary care physician to further workup why he had a compression fracture.    Plan:  -Continue physical therapy and continue home physical therapy exercises as tolerated.  -Patient to follow-up with us in 6 to 8 weeks after he completes physical therapy if he has  persistent axial back pain, at which point we can consider obtaining a lumbar spine MRI to help guide next steps in management.    In the future if we consider any interventional pain procedures patient will need a clearance from his hematologist in terms of either stopping or bridging his Coumadin in the perioperative period    -In the future can consider injections versus spinal cord stimulation versus intrathecal drug delivery system if patient has persistent axial back pain.    -Discussed with patient that he should follow-up with his primary care physician to further workup why he had a acute vertebral non-traumatic compression fracture.    -Instructed on red flag symptoms, such as new or worsening weakness, loss of bowel/bladder control, saddle anesthesia, if he were to experience these she should present to the closest ER for further evaluation     Follow up:  6-8 weeks    The patient was invited to contact us back anytime with any questions or concerns and follow-up with us in the office as needed.     Diagnoses and all orders for this visit:  Neurogenic claudication due to lumbar spinal stenosis  -     Referral to Pain Medicine      This note was generated with the aid of dictation software, there may be typos despite my attempts at proofreading.   The patient was discussed and seen with Dr. Gao.  Shen Mejia MD  PGY-5  Interventional Pain Fellow

## 2024-08-30 ENCOUNTER — ANTICOAGULATION - WARFARIN VISIT (OUTPATIENT)
Dept: CARDIOLOGY | Facility: CLINIC | Age: 74
End: 2024-08-30
Payer: MEDICARE

## 2024-08-30 DIAGNOSIS — I26.99 PULMONARY EMBOLISM, UNSPECIFIED CHRONICITY, UNSPECIFIED PULMONARY EMBOLISM TYPE, UNSPECIFIED WHETHER ACUTE COR PULMONALE PRESENT (MULTI): Primary | ICD-10-CM

## 2024-08-30 LAB
POC INR: 1.9
POC PROTHROMBIN TIME: NORMAL

## 2024-08-30 PROCEDURE — 85610 PROTHROMBIN TIME: CPT | Mod: QW

## 2024-08-30 PROCEDURE — 99211 OFF/OP EST MAY X REQ PHY/QHP: CPT

## 2024-08-30 NOTE — PROGRESS NOTES
Patient identification verified with 2 identifiers.    Location: [facility] - [address] [address] [phone] option #1     Referring Physician: Dr. Marla Vela  Enrollment/ Re-enrollment date: 25   INR Goal: 2.0-3.0  INR monitoring is per AMS protocol.  Anticoagulation Medication: warfarin  Indication: Pulmonary Embolism (PE)    Subjective   Bleeding signs/symptoms: No    Bruising: No   Major bleeding event: No  Thrombosis signs/symptoms: No  Thromboembolic event: No  Missed doses: Yes  missed dose  Extra doses: No  Medication changes: No  Dietary changes: No  Change in health: No  Change in activity: No  Alcohol: No  Other concerns: No    Upcoming Procedures:  Does the Patient Have any upcoming procedures that require interruption in anticoagulation therapy? no  Does the patient require bridging? no      Anticoagulation Summary  As of 2024      INR goal:  2.0-3.0   TTR:  45.0% (10.7 mo)   INR used for dosin.90 (2024)   Weekly warfarin total:  30 mg               Assessment/Plan   Sub therapeutic in setting of one missed dose. Patient remembered this morning and took extra half tablet.    1. New dose: no change    2. Next INR: 1 week      Education provided to patient during the visit:  Patient instructed to call in interim with questions, concerns and changes.   Patient educated on interactions between medications and warfarin.   Patient educated on dietary consistency in vitamin k consumption.   Patient educated on affects of alcohol consumption while taking warfarin.   Patient educated on signs of bleeding/clotting.   Patient educated on compliance with dosing, follow up appointments, and prescribed plan of care.

## 2024-09-05 ENCOUNTER — DOCUMENTATION (OUTPATIENT)
Dept: PHYSICAL THERAPY | Facility: CLINIC | Age: 74
End: 2024-09-05
Payer: MEDICARE

## 2024-09-05 ENCOUNTER — APPOINTMENT (OUTPATIENT)
Dept: PHYSICAL THERAPY | Facility: CLINIC | Age: 74
End: 2024-09-05
Payer: MEDICARE

## 2024-09-05 NOTE — PROGRESS NOTES
Physical Therapy                 Therapy Communication Note    Patient Name: Louis Rainey  MRN: [id number]  Today's Date: 9/5/2024     Discipline: Physical Therapy    Missed Visit Reason:  canceled through , no reason left.     Missed Time: Cancel    Comment:

## 2024-09-06 ENCOUNTER — TELEPHONE (OUTPATIENT)
Dept: CARDIOLOGY | Facility: CLINIC | Age: 74
End: 2024-09-06

## 2024-09-06 ENCOUNTER — APPOINTMENT (OUTPATIENT)
Dept: CARDIOLOGY | Facility: CLINIC | Age: 74
End: 2024-09-06
Payer: MEDICARE

## 2024-09-09 ENCOUNTER — TREATMENT (OUTPATIENT)
Dept: PHYSICAL THERAPY | Facility: CLINIC | Age: 74
End: 2024-09-09
Payer: MEDICARE

## 2024-09-09 DIAGNOSIS — M54.9 BACK PAIN: ICD-10-CM

## 2024-09-09 DIAGNOSIS — M48.062 NEUROGENIC CLAUDICATION DUE TO LUMBAR SPINAL STENOSIS: ICD-10-CM

## 2024-09-09 PROCEDURE — 97110 THERAPEUTIC EXERCISES: CPT | Mod: GP,CQ

## 2024-09-09 ASSESSMENT — PAIN - FUNCTIONAL ASSESSMENT: PAIN_FUNCTIONAL_ASSESSMENT: 0-10

## 2024-09-09 ASSESSMENT — PAIN SCALES - GENERAL: PAINLEVEL_OUTOF10: 8

## 2024-09-09 NOTE — PROGRESS NOTES
Physical Therapy    Physical Therapy Evaluation    Patient Name: Louis Rainey  MRN: [id number]  Today's Date: 9/9/2024  Time Entry:  Time Calculation  Start Time: 0100  Stop Time: 0145  Time Calculation (min): 45 min     PT Therapeutic Procedures Time Entry  Therapeutic Exercise Time Entry: 45                 Visit #3  Insurance; Medicare  Cert; 8/15/2024 - 11/12/2024    Problem List Items Addressed This Visit             ICD-10-CM    Back pain M54.9     Other Visit Diagnoses         Codes    Neurogenic claudication due to lumbar spinal stenosis     M48.062            Assessment;  Evaluation/Treatment Tolerance: Patient tolerated treatment well  Demonstrates good carry over of exercises and understanding. Added exercises received well. Reviewed proper body mechanics for lifting. Rest break taken after squatting exercise. Core strength progressing. Doing well when leaving.     Plan   Recommended frequency; 1-2 visits x 5-6 visits (total of 10 visits)    Current Problem  1. Neurogenic claudication due to lumbar spinal stenosis  Follow Up In Physical Therapy      2. Back pain  Follow Up In Physical Therapy        Subjective   Pain only when standing or walking  When walking into clinic my pain was at an 8/10  I have been performing my HEP and feel that some of them have gotten easier and my mobility has improved, although my pain has remained the same    Precautions: NA     Vital Signs: NA     Pain:  Pain Assessment: 0-10  0-10 (Numeric) Pain Score: 8  Home Living: lives alone in house     Prior Function Per Pt/Caregiver Report: independent        Objective   Posture: forward leaning from waist, unable to straighten up in standing     Range of Motion: extensions and rotations not test  Lumbar flexion with moderate loss      Strength: Left Quads and hip flexors 4/5 (hx of multiple knee surgeries)                   Left hip abductors and flexors 4+/5                   Right Quads and hip flexors and abductors 4+/5      Flexibility: WNL     Palpation: WNL     Special Tests: NA     Gait: slow fernandez, short steps, reduced trunk dissociation     Outcome Measures:  Modified Oswestry = 36%   30 seconds sit/stand = 8 (no arm assist)  6 MWT = 475 feet (2 minute seated rest required during test)  OP EDUCATION:       PT intervention;  Stepper lvl 3 x 7 min  Step stretch/lunge x 15 each leg   Gait through clinic  (postural/heel toe cueing) x 2 laps  Rowing with GTB x 20 (cues given on postural retraction)  Edge of bed hip flexor stretching 2 x 30 sec each  Hook lying lower trunk rotations x 30  Double knee to chest 20 x 5 sec holds  Single knee to chest stretch 2 x 30 sec each   ISO blue theraband hip abd with bridge x 20  Dead bug with cues on TA contraction x 20 each  Hooklying march Blue TB with TA holding x 20 each  Repeated lumbar flexion in sitting, RFIS x 20  Theraball pick ups with trunk rotations x 20 (cueing given on proper lifting form)    Access Code: [email or web address]  URL: [email or web address]  Date: 08/22/2024  Prepared by: Reymundo Johnson    Exercises  - Dead Bug  - 1 x daily - 7 x weekly - 2 sets - 10 reps  - Seated Hamstring Stretch  - 1 x daily - 7 x weekly - 2 sets - 3 reps - 30 hold  - Seated Flexion Stretch  - 1 x daily - 7 x weekly - 2 sets - 10 reps  - Supine Double Knee to Chest  - 1 x daily - 7 x weekly - 2 sets - 10 reps - 3 hold  - Supine Hip Adduction Isometric with Ball  - 1 x daily - 7 x weekly - 2 sets - 10 reps - 5, contract abs while squeezing ball hold  - Hooklying Clamshell with Resistance  - 1 x daily - 7 x weekly - 2 sets - 10 reps - contract abs when moving legs apart hold    Goals:  Active       PT Problem       PT Goal 1       Start:  08/15/24    Expected End:  11/12/24       Patient will reduce difficulties related to functional mobilities and quality of life, as evident by Modified Oswestry score reduction by 50%         PT Goal 2       Start:  08/15/24    Expected End:  11/12/24        Patient will report reduced/abolished pain in lower back, indicated by grade of 0-3 on 0-10 pain scale, with common daily activities          PT Goal 3       Start:  08/15/24    Expected End:  11/12/24       Patient will demonstrated improved ROM of  lumbar spine in sagittal and horizontal planes, evident by full functional motion          PT Goal 4       Start:  08/15/24    Expected End:  11/12/24       Demonstrated improved gait speed over 1.1 m/s and tolerance with prolong standing/walking, with 6 MWT over 700 feet         PT Goal 5       Start:  08/15/24    Expected End:  11/12/24       Patient will demonstrated knowledge of HEP, its maintenance frequency, and associated benefits

## 2024-09-10 ENCOUNTER — APPOINTMENT (OUTPATIENT)
Dept: CARDIOLOGY | Facility: HOSPITAL | Age: 74
End: 2024-09-10
Payer: MEDICARE

## 2024-09-10 ENCOUNTER — APPOINTMENT (OUTPATIENT)
Dept: PAIN MEDICINE | Facility: HOSPITAL | Age: 74
End: 2024-09-10
Payer: MEDICARE

## 2024-09-11 ENCOUNTER — ANTICOAGULATION - WARFARIN VISIT (OUTPATIENT)
Dept: CARDIOLOGY | Facility: CLINIC | Age: 74
End: 2024-09-11
Payer: MEDICARE

## 2024-09-11 DIAGNOSIS — I26.99 PULMONARY EMBOLISM, UNSPECIFIED CHRONICITY, UNSPECIFIED PULMONARY EMBOLISM TYPE, UNSPECIFIED WHETHER ACUTE COR PULMONALE PRESENT (MULTI): Primary | ICD-10-CM

## 2024-09-12 ENCOUNTER — TREATMENT (OUTPATIENT)
Dept: PHYSICAL THERAPY | Facility: CLINIC | Age: 74
End: 2024-09-12
Payer: MEDICARE

## 2024-09-12 DIAGNOSIS — M54.9 BACK PAIN: ICD-10-CM

## 2024-09-12 DIAGNOSIS — M48.062 NEUROGENIC CLAUDICATION DUE TO LUMBAR SPINAL STENOSIS: ICD-10-CM

## 2024-09-12 PROCEDURE — 97110 THERAPEUTIC EXERCISES: CPT | Mod: GP,CQ

## 2024-09-12 ASSESSMENT — PAIN SCALES - GENERAL: PAINLEVEL_OUTOF10: 4

## 2024-09-12 ASSESSMENT — PAIN - FUNCTIONAL ASSESSMENT: PAIN_FUNCTIONAL_ASSESSMENT: 0-10

## 2024-09-12 NOTE — PROGRESS NOTES
Physical Therapy    Physical Therapy Evaluation    Patient Name: Louis Rainey  MRN: [id number]  Today's Date: 9/12/2024  Time Entry:  Time Calculation  Start Time: 0145  Stop Time: 0230  Time Calculation (min): 45 min     PT Therapeutic Procedures Time Entry  Therapeutic Exercise Time Entry: 45                 Visit #4  Insurance; Medicare  Cert; 8/15/2024 - 11/12/2024    Problem List Items Addressed This Visit             ICD-10-CM    Back pain M54.9     Other Visit Diagnoses         Codes    Neurogenic claudication due to lumbar spinal stenosis     M48.062          Assessment;  Evaluation/Treatment Tolerance: Patient tolerated treatment well  Good overall response to treatment today. Fatigue reported intermittently through session, rest breaks taken. Slight aggravation of back from resisted trunk rotations, states low and tolerable. No other complaints this session.     Plan   Recommended frequency; 1-2 visits x 5-6 visits (total of 10 visits)    Current Problem  1. Neurogenic claudication due to lumbar spinal stenosis  Follow Up In Physical Therapy      2. Back pain  Follow Up In Physical Therapy        Subjective   No pain at the moment  I continue to have pain when standing, worse when standing still, my pain when walking has improved as it takes a longer length of time for it to increase  I have been able to walk for longer durations  Doing HEP daily    Precautions: NA     Vital Signs: NA     Pain:  Pain Assessment: 0-10  0-10 (Numeric) Pain Score: 4  Home Living: lives alone in house     Prior Function Per Pt/Caregiver Report: independent        Objective   Posture: forward leaning from waist, unable to straighten up in standing     Range of Motion: extensions and rotations not test  Lumbar flexion with moderate loss      Strength: Left Quads and hip flexors 4/5 (hx of multiple knee surgeries)                   Left hip abductors and flexors 4+/5                   Right Quads and hip flexors and abductors  4+/5     Flexibility: WNL     Palpation: WNL     Special Tests: NA     Gait: slow fernandez, short steps, reduced trunk dissociation     Outcome Measures:  Modified Oswestry = 36%   30 seconds sit/stand = 8 (no arm assist)  6 MWT = 475 feet (2 minute seated rest required during test)  OP EDUCATION:     PT intervention;  Stepper lvl 4 x 6 min  Step stretch/lunge x 15 each leg   Blue TB lateral walking white tape D/B x 3  Hook lying lower trunk rotations x 30  Double knee to chest 20 x 5 sec holds  Hip adduction with bridge x 30  SLR with TA holding x 15 each leg  Hooklying march Blue TB with TA holding x 20 each  Crunch x 20  Forward/left/right seated flexion with theraball x 15 each direction  Sit to stand from chair with medium weight ball x 20  Resisted GTB trunk rotations x 15 each side    Not today  Gait through clinic  (postural/heel toe cueing) x 2 laps  Rowing with GTB x 20 (cues given on postural retraction)  Edge of bed hip flexor stretching 2 x 30 sec each    Access Code: [email or web address]  URL: [email or web address]  Date: 08/22/2024  Prepared by: Reymundo Johnson    Exercises  - Dead Bug  - 1 x daily - 7 x weekly - 2 sets - 10 reps  - Seated Hamstring Stretch  - 1 x daily - 7 x weekly - 2 sets - 3 reps - 30 hold  - Seated Flexion Stretch  - 1 x daily - 7 x weekly - 2 sets - 10 reps  - Supine Double Knee to Chest  - 1 x daily - 7 x weekly - 2 sets - 10 reps - 3 hold  - Supine Hip Adduction Isometric with Ball  - 1 x daily - 7 x weekly - 2 sets - 10 reps - 5, contract abs while squeezing ball hold  - Hooklying Clamshell with Resistance  - 1 x daily - 7 x weekly - 2 sets - 10 reps - contract abs when moving legs apart hold    Goals:  Active       PT Problem       PT Goal 1       Start:  08/15/24    Expected End:  11/12/24       Patient will reduce difficulties related to functional mobilities and quality of life, as evident by Modified Oswestry score reduction by 50%         PT Goal 2       Start:   08/15/24    Expected End:  11/12/24       Patient will report reduced/abolished pain in lower back, indicated by grade of 0-3 on 0-10 pain scale, with common daily activities          PT Goal 3       Start:  08/15/24    Expected End:  11/12/24       Patient will demonstrated improved ROM of  lumbar spine in sagittal and horizontal planes, evident by full functional motion          PT Goal 4       Start:  08/15/24    Expected End:  11/12/24       Demonstrated improved gait speed over 1.1 m/s and tolerance with prolong standing/walking, with 6 MWT over 700 feet         PT Goal 5       Start:  08/15/24    Expected End:  11/12/24       Patient will demonstrated knowledge of HEP, its maintenance frequency, and associated benefits

## 2024-09-13 ENCOUNTER — ANTICOAGULATION - WARFARIN VISIT (OUTPATIENT)
Dept: CARDIOLOGY | Facility: CLINIC | Age: 74
End: 2024-09-13
Payer: MEDICARE

## 2024-09-13 DIAGNOSIS — I26.99 PULMONARY EMBOLISM, UNSPECIFIED CHRONICITY, UNSPECIFIED PULMONARY EMBOLISM TYPE, UNSPECIFIED WHETHER ACUTE COR PULMONALE PRESENT (MULTI): Primary | ICD-10-CM

## 2024-09-13 LAB
POC INR: 1.7
POC PROTHROMBIN TIME: NORMAL

## 2024-09-13 PROCEDURE — 99211 OFF/OP EST MAY X REQ PHY/QHP: CPT

## 2024-09-13 PROCEDURE — 85610 PROTHROMBIN TIME: CPT | Mod: QW

## 2024-09-13 NOTE — PROGRESS NOTES
Patient identification verified with 2 identifiers.    Location: [facility] at [facility] - [id number] [address] [phone] option #1     Referring Physician: Dr. Marla Vela  Enrollment/ Re-enrollment date: 25   INR Goal: 2.0-3.0  INR monitoring is per [facility] protocol.  Anticoagulation Medication: warfarin  Indication: Pulmonary Embolism (PE)    Subjective   Bleeding signs/symptoms: No    Bruising: No   Major bleeding event: No  Thrombosis signs/symptoms: No  Thromboembolic event: No  Missed doses: No  Extra doses: No  Medication changes: No  Dietary changes: No  Change in health: No  Change in activity: No  Alcohol: No  Other concerns: No    Upcoming Procedures:  Does the Patient Have any upcoming procedures that require interruption in anticoagulation therapy? no  Does the patient require bridging? no      Anticoagulation Summary  As of 2024      INR goal:  2.0-3.0   TTR:  43.1% (11.2 mo)   INR used for dosin.70 (2024)   Weekly warfarin total:  33.75 mg               Assessment/Plan   Subtherapeutic     1. New dose:  will increase dose per protocol.      2. Next INR: 1 week      Education provided to patient during the visit:  Patient instructed to call in interim with questions, concerns and changes.   Patient educated on interactions between medications and warfarin.   Patient educated on dietary consistency in vitamin k consumption.   Patient educated on affects of alcohol consumption while taking warfarin.   Patient educated on signs of bleeding/clotting.   Patient educated on compliance with dosing, follow up appointments, and prescribed plan of care.

## 2024-09-16 ENCOUNTER — TREATMENT (OUTPATIENT)
Dept: PHYSICAL THERAPY | Facility: CLINIC | Age: 74
End: 2024-09-16
Payer: MEDICARE

## 2024-09-16 DIAGNOSIS — M48.062 NEUROGENIC CLAUDICATION DUE TO LUMBAR SPINAL STENOSIS: ICD-10-CM

## 2024-09-16 DIAGNOSIS — M54.9 BACK PAIN: ICD-10-CM

## 2024-09-16 PROCEDURE — 97110 THERAPEUTIC EXERCISES: CPT | Mod: GP,CQ

## 2024-09-16 ASSESSMENT — PAIN SCALES - GENERAL: PAINLEVEL_OUTOF10: 3

## 2024-09-16 ASSESSMENT — PAIN - FUNCTIONAL ASSESSMENT: PAIN_FUNCTIONAL_ASSESSMENT: 0-10

## 2024-09-16 NOTE — PROGRESS NOTES
Physical Therapy    Physical Therapy Treatment    Patient Name: Louis Rainey  MRN: [id number]  Today's Date: 9/16/2024  Time Entry:  Time Calculation  Start Time: 0145  Stop Time: 0230  Time Calculation (min): 45 min     PT Therapeutic Procedures Time Entry  Therapeutic Exercise Time Entry: 45                 Visit #5  Insurance; Medicare  Cert; 8/15/2024 - 11/12/2024    Problem List Items Addressed This Visit             ICD-10-CM    Back pain M54.9     Other Visit Diagnoses         Codes    Neurogenic claudication due to lumbar spinal stenosis     M48.062          Assessment;  Evaluation/Treatment Tolerance: Patient tolerated treatment well  Matrix machine walk outs added, rest breaks given as needed by the patient, mostly challenged by retro walking. No reports of painful increase through the lower back today. Continue rowing and postural cueing, hip flexor stretching next session. Feeling well when leaving.     Plan   Recommended frequency; 1-2 visits x 5-6 visits (total of 10 visits)    Current Problem  1. Neurogenic claudication due to lumbar spinal stenosis  Follow Up In Physical Therapy      2. Back pain  Follow Up In Physical Therapy        Subjective   Walking into clinic with 3/10 pain through lower back, continues to increase with longer walks  My walking tolerance continues to improve  Performed HEP this morning    Precautions: NA     Vital Signs: NA     Pain:  Pain Assessment: 0-10  0-10 (Numeric) Pain Score: 3  Home Living: lives alone in house     Prior Function Per Pt/Caregiver Report: independent        Objective   Posture: forward leaning from waist, unable to straighten up in standing     Range of Motion: extensions and rotations not test  Lumbar flexion with moderate loss      Strength: Left Quads and hip flexors 4/5 (hx of multiple knee surgeries)                   Left hip abductors and flexors 4+/5                   Right Quads and hip flexors and abductors 4+/5     Flexibility: WNL      Palpation: WNL     Special Tests: NA     Gait: slow fernandez, short steps, reduced trunk dissociation     Outcome Measures:  Modified Oswestry = 36%   30 seconds sit/stand = 8 (no arm assist)  6 MWT = 475 feet (2 minute seated rest required during test)  OP EDUCATION:     PT intervention;    Posture correction cueing through out session  Stepper lvl 4 x 8 min  Matrix machine walk outs forwards 12.5 x 5 (TA hold during walking)  Matrix machine walk outs backwards 12.5 x 5 (TA hold during walking)  Matrix machine lateral walking each way 12.5 x 5 (TA hold during walking)  Rowing BlueTB x 30  Double knee to chest 10 x 10 sec  LTR on Blue theraball with TA hold x 30  Hip adduction with bridge 25 x 3 sec hold  Seated trunk flexions x 25    Not today  Gait through clinic  (postural/heel toe cueing) x 2 laps  Rowing with GTB x 20 (cues given on postural retraction)  Edge of bed hip flexor stretching 2 x 30 sec each    Access Code: [email or web address]  URL: [email or web address]  Date: 08/22/2024  Prepared by: Reymundo Johnson    Exercises  - Dead Bug  - 1 x daily - 7 x weekly - 2 sets - 10 reps  - Seated Hamstring Stretch  - 1 x daily - 7 x weekly - 2 sets - 3 reps - 30 hold  - Seated Flexion Stretch  - 1 x daily - 7 x weekly - 2 sets - 10 reps  - Supine Double Knee to Chest  - 1 x daily - 7 x weekly - 2 sets - 10 reps - 3 hold  - Supine Hip Adduction Isometric with Ball  - 1 x daily - 7 x weekly - 2 sets - 10 reps - 5, contract abs while squeezing ball hold  - Hooklying Clamshell with Resistance  - 1 x daily - 7 x weekly - 2 sets - 10 reps - contract abs when moving legs apart hold    Goals:  Active       PT Problem       PT Goal 1       Start:  08/15/24    Expected End:  11/12/24       Patient will reduce difficulties related to functional mobilities and quality of life, as evident by Modified Oswestry score reduction by 50%         PT Goal 2       Start:  08/15/24    Expected End:  11/12/24       Patient will  report reduced/abolished pain in lower back, indicated by grade of 0-3 on 0-10 pain scale, with common daily activities          PT Goal 3       Start:  08/15/24    Expected End:  11/12/24       Patient will demonstrated improved ROM of  lumbar spine in sagittal and horizontal planes, evident by full functional motion          PT Goal 4       Start:  08/15/24    Expected End:  11/12/24       Demonstrated improved gait speed over 1.1 m/s and tolerance with prolong standing/walking, with 6 MWT over 700 feet         PT Goal 5       Start:  08/15/24    Expected End:  11/12/24       Patient will demonstrated knowledge of HEP, its maintenance frequency, and associated benefits

## 2024-09-18 ENCOUNTER — TREATMENT (OUTPATIENT)
Dept: PHYSICAL THERAPY | Facility: CLINIC | Age: 74
End: 2024-09-18
Payer: MEDICARE

## 2024-09-18 DIAGNOSIS — M54.9 BACK PAIN: ICD-10-CM

## 2024-09-18 DIAGNOSIS — M48.062 NEUROGENIC CLAUDICATION DUE TO LUMBAR SPINAL STENOSIS: ICD-10-CM

## 2024-09-18 PROCEDURE — 97110 THERAPEUTIC EXERCISES: CPT | Mod: GP

## 2024-09-18 NOTE — PROGRESS NOTES
Physical Therapy    Physical Therapy Treatment    Patient Name: Louis Rainey  MRN: [id number]  Today's Date: 9/18/2024  Time Entry:  Time Calculation  Start Time: 1615  Stop Time: 1655  Time Calculation (min): 40 min     PT Therapeutic Procedures Time Entry  Therapeutic Exercise Time Entry: 40                 Visit #6  Insurance; Medicare  Cert; 8/15/2024 - 11/12/2024    Problem List Items Addressed This Visit             ICD-10-CM       Musculoskeletal and Injuries    Neurogenic claudication due to lumbar spinal stenosis M48.062     Assessment;     Matrix machine walk outs added, rest breaks given as needed by the patient, mostly challenged by retro walking. No reports of painful increase through the lower back today. Continue rowing and postural cueing, hip flexor stretching next session. Feeling well when leaving.   9/18/2024; Exercises well tolerated and demonstrated with mostly good body mechanics, without pain production across lower back    Plan  Patient education on form and posture, therapeutic exercise configuration   Recommended frequency; 1-2 visits x 5-6 visits (total of 10 visits)    Current Problem  1. Neurogenic claudication due to lumbar spinal stenosis  Follow Up In Physical Therapy      2. Back pain  Follow Up In Physical Therapy        Subjective   7/10 lower back pain. No pain when sitting and resting. Pain occurs with standing and walking, but more so with standing. Exercises don't bring them pain up, but when I am standing washing dishes or doing other things around house    Precautions: NA     Vital Signs: NA     Pain: 7/10 - across lower back     Home Living: lives alone in house     Prior Function Per Pt/Caregiver Report: independent        Objective   Posture: forward leaning from waist, unable to straighten up in standing     Range of Motion: extensions and rotations not test  Lumbar flexion with moderate loss      Strength: Left Quads and hip flexors 4/5 (hx of multiple knee  "surgeries)                   Left hip abductors and flexors 4+/5                   Right Quads and hip flexors and abductors 4+/5     Flexibility: WNL     Palpation: WNL     Special Tests: NA     Gait: slow fernandez, short steps, reduced trunk dissociation     Outcome Measures:  Modified Oswestry = 36%   30 seconds sit/stand = 8 (no arm assist)  6 MWT = 475 feet (2 minute seated rest required during test)  OP EDUCATION:     PT intervention;    Posture correction cueing through out session  Stepper lvl 5 x 8 min  Wall slides with back against ball; 2 x 10  Chops with 12 Ibs cable resistance, with 2 x 10 reps each side  Rows  Ant trunk rotations with Matrix cable resistance of 12 Ibs; 2 x 10  Trunk rotations with upward diagonal swing with Swiss Ball  Seated repeated lumbar flexion, roll out with ball  Doorway stretch-trunk rotation and lunge with one arm up in external rotation  Bilateral arm abductions \"Snow Angles\" in standing while leaning against Swiss Ball; 2 x 10      Goals:  Active       PT Problem       PT Goal 1       Start:  08/15/24    Expected End:  11/12/24       Patient will reduce difficulties related to functional mobilities and quality of life, as evident by Modified Oswestry score reduction by 50%         PT Goal 2       Start:  08/15/24    Expected End:  11/12/24       Patient will report reduced/abolished pain in lower back, indicated by grade of 0-3 on 0-10 pain scale, with common daily activities          PT Goal 3       Start:  08/15/24    Expected End:  11/12/24       Patient will demonstrated improved ROM of  lumbar spine in sagittal and horizontal planes, evident by full functional motion          PT Goal 4       Start:  08/15/24    Expected End:  11/12/24       Demonstrated improved gait speed over 1.1 m/s and tolerance with prolong standing/walking, with 6 MWT over 700 feet         PT Goal 5       Start:  08/15/24    Expected End:  11/12/24       Patient will demonstrated knowledge of HEP, " its maintenance frequency, and associated benefits

## 2024-09-20 ENCOUNTER — ANTICOAGULATION - WARFARIN VISIT (OUTPATIENT)
Dept: CARDIOLOGY | Facility: CLINIC | Age: 74
End: 2024-09-20
Payer: MEDICARE

## 2024-09-20 ENCOUNTER — APPOINTMENT (OUTPATIENT)
Dept: CARDIOLOGY | Facility: CLINIC | Age: 74
End: 2024-09-20
Payer: MEDICARE

## 2024-09-20 DIAGNOSIS — I26.99 PULMONARY EMBOLISM, UNSPECIFIED CHRONICITY, UNSPECIFIED PULMONARY EMBOLISM TYPE, UNSPECIFIED WHETHER ACUTE COR PULMONALE PRESENT (MULTI): Primary | ICD-10-CM

## 2024-09-20 NOTE — PROGRESS NOTES
Patient called to cancel his appointment for today.  New appointment was made for 9/27/24 @ 1300 per patient's request.

## 2024-09-23 ENCOUNTER — TREATMENT (OUTPATIENT)
Dept: PHYSICAL THERAPY | Facility: CLINIC | Age: 74
End: 2024-09-23
Payer: MEDICARE

## 2024-09-23 DIAGNOSIS — M54.9 BACK PAIN: ICD-10-CM

## 2024-09-23 DIAGNOSIS — M48.062 NEUROGENIC CLAUDICATION DUE TO LUMBAR SPINAL STENOSIS: ICD-10-CM

## 2024-09-23 PROCEDURE — 97110 THERAPEUTIC EXERCISES: CPT | Mod: GP,CQ

## 2024-09-23 ASSESSMENT — PAIN SCALES - GENERAL: PAINLEVEL_OUTOF10: 0 - NO PAIN

## 2024-09-23 ASSESSMENT — PAIN - FUNCTIONAL ASSESSMENT: PAIN_FUNCTIONAL_ASSESSMENT: 0-10

## 2024-09-23 NOTE — PROGRESS NOTES
Physical Therapy    Physical Therapy Treatment    Patient Name: Louis Rainey  MRN: [id number]  Today's Date: 9/23/2024  Time Entry:  Time Calculation  Start Time: 1255  Stop Time: 1335  Time Calculation (min): 40 min     PT Therapeutic Procedures Time Entry  Therapeutic Exercise Time Entry: 40                 Visit #7  Insurance; Medicare  Cert; 8/15/2024 - 11/12/2024    Problem List Items Addressed This Visit             ICD-10-CM    Neurogenic claudication due to lumbar spinal stenosis M48.062     Other Visit Diagnoses         Codes    Back pain     M54.9          Assessment;  Evaluation/Treatment Tolerance: Patient tolerated treatment well  Quick grasp of his exercises with good carry over. Intermittent discomfort through the lower back in standing, all tolerable. Addition of prone extensor strengthening today, good response, continues hip flexor stretching at home; cueing through session to maintain upright posture/bring hips forward. Feeling well when leaving.     Plan  Patient education on form and posture, therapeutic exercise configuration   Recommended frequency; 1-2 visits x 5-6 visits (total of 10 visits)    Current Problem  1. Neurogenic claudication due to lumbar spinal stenosis  Follow Up In Physical Therapy      2. Back pain  Follow Up In Physical Therapy        Subjective   My pain is the same, continues while walking and standing, minimal to none while seated  Currently while warming up on stepper I have minimal pain  Continues to be compliant with his HEP    Precautions: NA     Vital Signs: NA     Pain: 7/10 - across lower back  Pain Assessment: 0-10  0-10 (Numeric) Pain Score: 0 - No pain  Home Living: lives alone in house     Prior Function Per Pt/Caregiver Report: independent        Objective   Posture: forward leaning from waist, unable to straighten up in standing     Range of Motion: extensions and rotations not test  Lumbar flexion with moderate loss      Strength: Left Quads and hip  flexors 4/5 (hx of multiple knee surgeries)                   Left hip abductors and flexors 4+/5                   Right Quads and hip flexors and abductors 4+/5     Flexibility: WNL     Palpation: WNL     Special Tests: NA     Gait: slow fernandez, short steps, reduced trunk dissociation     Outcome Measures:  Modified Oswestry = 36%   30 seconds sit/stand = 8 (no arm assist)  6 MWT = 475 feet (2 minute seated rest required during test)  OP EDUCATION:     PT intervention;    Posture correction cueing through out session  Stepper lvl 5 x 6 min  Wall slides with back against ball; 2 x 10  Chops with 12 Ibs cable resistance, with 2 x 10 reps each side  Rows Black TB x 30 (TA contraction through out)  Anti trunk rotations with Matrix cable resistance of 12 Ibs; 2 x 10 (each side)  Trunk rotations with upward diagonal swing with Swiss Ball x 20  Seated repeated lumbar flexion, roll out with ball x 20  Doorway stretch-trunk rotation and lunge with one arm up in external rotation 10 x 3 sec each  Sit to stand with MB push out and alternate march/ta hold x 20  Prone trunk extensions x 30    Goals:  Active       PT Problem       PT Goal 1       Start:  08/15/24    Expected End:  11/12/24       Patient will reduce difficulties related to functional mobilities and quality of life, as evident by Modified Oswestry score reduction by 50%         PT Goal 2       Start:  08/15/24    Expected End:  11/12/24       Patient will report reduced/abolished pain in lower back, indicated by grade of 0-3 on 0-10 pain scale, with common daily activities          PT Goal 3       Start:  08/15/24    Expected End:  11/12/24       Patient will demonstrated improved ROM of  lumbar spine in sagittal and horizontal planes, evident by full functional motion          PT Goal 4       Start:  08/15/24    Expected End:  11/12/24       Demonstrated improved gait speed over 1.1 m/s and tolerance with prolong standing/walking, with 6 MWT over 700 feet          PT Goal 5       Start:  08/15/24    Expected End:  11/12/24       Patient will demonstrated knowledge of HEP, its maintenance frequency, and associated benefits

## 2024-09-26 ENCOUNTER — TREATMENT (OUTPATIENT)
Dept: PHYSICAL THERAPY | Facility: CLINIC | Age: 74
End: 2024-09-26
Payer: MEDICARE

## 2024-09-26 DIAGNOSIS — M48.062 NEUROGENIC CLAUDICATION DUE TO LUMBAR SPINAL STENOSIS: ICD-10-CM

## 2024-09-26 DIAGNOSIS — M54.9 BACK PAIN: ICD-10-CM

## 2024-09-26 PROCEDURE — 97110 THERAPEUTIC EXERCISES: CPT | Mod: GP,CQ

## 2024-09-26 ASSESSMENT — PAIN SCALES - GENERAL: PAINLEVEL_OUTOF10: 0 - NO PAIN

## 2024-09-26 ASSESSMENT — PAIN - FUNCTIONAL ASSESSMENT: PAIN_FUNCTIONAL_ASSESSMENT: 0-10

## 2024-09-26 NOTE — PROGRESS NOTES
Physical Therapy    Physical Therapy Treatment    Patient Name: Louis Rainey  MRN: [id number]  Today's Date: 9/26/2024  Time Entry:  Time Calculation  Start Time: 0145  Stop Time: 0230  Time Calculation (min): 45 min     PT Therapeutic Procedures Time Entry  Therapeutic Exercise Time Entry: 45                 Visit #8  Insurance; Medicare  Cert; 8/15/2024 - 11/12/2024    Problem List Items Addressed This Visit             ICD-10-CM    Neurogenic claudication due to lumbar spinal stenosis M48.062     Other Visit Diagnoses         Codes    Back pain     M54.9          Assessment;  Evaluation/Treatment Tolerance: Patient tolerated treatment well  Progressed core and UE strengthening today with increased resistance. Continues to perform well through out session and have low tolerable pains intermittently when standing. Introduction of singe leg activities for low back and general stability while challenging the core, good balance when on foam using intermittent UE finger tapping. No issues when leaving today, feeling well.     Plan  Patient education on form and posture, therapeutic exercise configuration   Recommended frequency; 1-2 visits x 5-6 visits (total of 10 visits)    Current Problem  1. Neurogenic claudication due to lumbar spinal stenosis  Follow Up In Physical Therapy      2. Back pain  Follow Up In Physical Therapy        Subjective   No pain at start of treatment  Feeling tired today  HEP completed daily  Has not other notable updates since last treatment    Precautions: NA     Vital Signs: NA     Pain: 7/10 - across lower back  Pain Assessment: 0-10  0-10 (Numeric) Pain Score: 0 - No pain  Home Living: lives alone in house     Prior Function Per Pt/Caregiver Report: independent        Objective   Posture: forward leaning from waist, unable to straighten up in standing     Range of Motion: extensions and rotations not test  Lumbar flexion with moderate loss      Strength: Left Quads and hip flexors 4/5  (hx of multiple knee surgeries)                   Left hip abductors and flexors 4+/5                   Right Quads and hip flexors and abductors 4+/5     Flexibility: WNL     Palpation: WNL     Special Tests: NA     Gait: slow fernandez, short steps, reduced trunk dissociation     Outcome Measures:  Modified Oswestry = 36%   30 seconds sit/stand = 8 (no arm assist)  6 MWT = 475 feet (2 minute seated rest required during test)  OP EDUCATION:     PT intervention;  Stepper lvl 5 x 6 min  LTR x 30  Active hamstring stretching 20 x 3 sec holds  Wall slides with back against ball; 2 x 10  Seated repeated lumbar flexion, roll out with ball x 20  12.5# matrix rows x 30  Chops with 12 Ibs cable resistance, with 2 x 10 reps each side  Anti trunk rotations with Matrix cable resistance of 12 Ibs; 2 x 10 (each side)  Trunk rotations with upward diagonal swing with Swiss Ball x 20  Sit to stand with MB push out and alternate march/ta hold x 20  SLS on foam with TA hold 10 x max duration each side  Prone trunk extensions x 30    Goals:  Active       PT Problem       PT Goal 1       Start:  08/15/24    Expected End:  11/12/24       Patient will reduce difficulties related to functional mobilities and quality of life, as evident by Modified Oswestry score reduction by 50%         PT Goal 2       Start:  08/15/24    Expected End:  11/12/24       Patient will report reduced/abolished pain in lower back, indicated by grade of 0-3 on 0-10 pain scale, with common daily activities          PT Goal 3       Start:  08/15/24    Expected End:  11/12/24       Patient will demonstrated improved ROM of  lumbar spine in sagittal and horizontal planes, evident by full functional motion          PT Goal 4       Start:  08/15/24    Expected End:  11/12/24       Demonstrated improved gait speed over 1.1 m/s and tolerance with prolong standing/walking, with 6 MWT over 700 feet         PT Goal 5       Start:  08/15/24    Expected End:  11/12/24        Patient will demonstrated knowledge of HEP, its maintenance frequency, and associated benefits

## 2024-09-27 ENCOUNTER — ANTICOAGULATION - WARFARIN VISIT (OUTPATIENT)
Dept: CARDIOLOGY | Facility: CLINIC | Age: 74
End: 2024-09-27
Payer: MEDICARE

## 2024-09-27 DIAGNOSIS — I26.99 PULMONARY EMBOLISM, UNSPECIFIED CHRONICITY, UNSPECIFIED PULMONARY EMBOLISM TYPE, UNSPECIFIED WHETHER ACUTE COR PULMONALE PRESENT (MULTI): Primary | ICD-10-CM

## 2024-09-27 LAB
POC INR: 3.2
POC PROTHROMBIN TIME: NORMAL

## 2024-09-27 PROCEDURE — 99211 OFF/OP EST MAY X REQ PHY/QHP: CPT

## 2024-09-27 PROCEDURE — 85610 PROTHROMBIN TIME: CPT | Mod: QW

## 2024-09-27 NOTE — PROGRESS NOTES
Patient identification verified with 2 identifiers.    Location: [facility] at [facility] - [address] [address] [phone] option #1     Referring Physician: Dr. Marla Vela  Enrollment/ Re-enrollment date: 2/22/25   INR Goal: 2.0-3.0  INR monitoring is per AMS protocol.  Anticoagulation Medication: warfarin  Indication: Pulmonary Embolism (PE)    Subjective   Bleeding signs/symptoms: No    Bruising: No   Major bleeding event: No  Thrombosis signs/symptoms: No  Thromboembolic event: No  Missed doses: No  Extra doses: No  Medication changes: No  Dietary changes: No  Change in health: No  Change in activity: No  Alcohol: No  Other concerns: No    Upcoming Procedures:  Does the Patient Have any upcoming procedures that require interruption in anticoagulation therapy? no  Does the patient require bridging? no      Anticoagulation Summary  As of 9/27/2024      INR goal:  2.0-3.0   TTR:  44.1% (11.7 mo)   INR used for dosing:  3.20 (9/27/2024)   Weekly warfarin total:  33.75 mg               Assessment/Plan   Supra  therapeutic  New dose: maintain dose as pt will be more consistent with green vegetables     Next INR: 1 week      Education provided to patient during the visit:  Patient instructed to call in interim with questions, concerns and changes.   Patient educated on interactions between medications and warfarin.   Patient educated on dietary consistency in vitamin k consumption.   Patient educated on affects of alcohol consumption while taking warfarin.   Patient educated on signs of bleeding/clotting.   Patient educated on compliance with dosing, follow up appointments, and prescribed plan of care.

## 2024-09-30 ENCOUNTER — TREATMENT (OUTPATIENT)
Dept: PHYSICAL THERAPY | Facility: CLINIC | Age: 74
End: 2024-09-30
Payer: MEDICARE

## 2024-09-30 DIAGNOSIS — M54.9 BACK PAIN: Primary | ICD-10-CM

## 2024-09-30 DIAGNOSIS — M48.062 NEUROGENIC CLAUDICATION DUE TO LUMBAR SPINAL STENOSIS: ICD-10-CM

## 2024-09-30 PROCEDURE — 97110 THERAPEUTIC EXERCISES: CPT | Mod: GP

## 2024-09-30 NOTE — PROGRESS NOTES
Physical Therapy    Physical Therapy Treatment    Patient Name: Louis Rainey  MRN: [id number]  Today's Date: 9/30/2024  Time Entry:  Time Calculation  Start Time: 1430  Stop Time: 1515  Time Calculation (min): 45 min     PT Therapeutic Procedures Time Entry  Therapeutic Exercise Time Entry: 45                 Visit #9  Insurance; Medicare  Cert; 8/15/2024 - 11/12/2024    Problem List Items Addressed This Visit             ICD-10-CM       Musculoskeletal and Injuries    Back pain - Primary M54.9     Assessment;     Progressed core and UE strengthening today with increased resistance. Continues to perform well through out session and have low tolerable pains intermittently when standing. Introduction of singe leg activities for low back and general stability while challenging the core, good balance when on foam using intermittent UE finger tapping. No issues when leaving today, feeling well.   Standing activities still present a concern. Further ortho spine consultation or pain management consultation advised.     Plan  Patient education on form and posture, therapeutic exercise configuration   Recommended frequency; 1-2 visits x 5-6 visits (total of 10 visits)  No more scheduled visits at this time  Current Problem  1. Back pain  Follow Up In Physical Therapy      2. Neurogenic claudication due to lumbar spinal stenosis  Follow Up In Physical Therapy        Subjective   Doing okay right now. Working out regularly at home  HEP completed daily as well. When standing to do something, like washing dishes, I continue to have discomfort and I should probably go back to my doctor again.   Has not other notable updates since last treatment    Precautions: NA     Vital Signs: NA     Pain: No pain when resting and sitting, only with standing and walking     Home Living: lives alone in house     Prior Function Per Pt/Caregiver Report: independent        Objective   Posture: forward leaning from waist, unable to straighten up  in standing     Range of Motion: extensions and rotations not test  Lumbar flexion with moderate loss      Strength: Left Quads and hip flexors 4/5 (hx of multiple knee surgeries)                   Left hip abductors and flexors 4+/5                   Right Quads and hip flexors and abductors 4+/5     Flexibility: WNL     Palpation: WNL     Special Tests: NA     Gait: slow fernandez, short steps, reduced trunk dissociation     Outcome Measures:  Modified Oswestry = 30%   30 seconds sit/stand = 8 (no arm assist)  6 MWT = 500 feet (2 minute seated rest required during test)  OP EDUCATION:     PT intervention;  Stepper lvl  6 minutes  Seated forward rolls with hands on Swiss Ball  Trunk rotations with upward diagonal swing with Swiss Ball x 20  LTR x 30  Active hamstring stretching 20 x 3 sec holds  Wall slides with back against ball; 3 x 10  Snow Angles in standing, back against ball  Added lunges with trunk rotations (medicine ball in hands)  Seated repeated lumbar flexion, roll out with ball x 20  12.5# matrix rows x 30  Rows instructed and demonstrated in two sets of 15 reps, using 25 Ibs resistance.   Chops with 12 Ibs cable resistance, with 2 x 10 reps each side  Anti trunk rotations with Matrix cable resistance of 12 Ibs; 2 x 10 (each side)  Sit to stand with MB push out and alternate march/ta hold x 20  SLS on foam with TA hold 10 x max duration each side  Prone trunk extensions x 30    Goals:  Active       PT Problem       PT Goal 1       Start:  08/15/24    Expected End:  11/12/24       Patient will reduce difficulties related to functional mobilities and quality of life, as evident by Modified Oswestry score reduction by 50%         PT Goal 2       Start:  08/15/24    Expected End:  11/12/24       Patient will report reduced/abolished pain in lower back, indicated by grade of 0-3 on 0-10 pain scale, with common daily activities          PT Goal 3       Start:  08/15/24    Expected End:  11/12/24       Patient  will demonstrated improved ROM of  lumbar spine in sagittal and horizontal planes, evident by full functional motion          PT Goal 4       Start:  08/15/24    Expected End:  11/12/24       Demonstrated improved gait speed over 1.1 m/s and tolerance with prolong standing/walking, with 6 MWT over 700 feet         PT Goal 5       Start:  08/15/24    Expected End:  11/12/24       Patient will demonstrated knowledge of HEP, its maintenance frequency, and associated benefits

## 2024-10-04 ENCOUNTER — ANTICOAGULATION - WARFARIN VISIT (OUTPATIENT)
Dept: CARDIOLOGY | Facility: CLINIC | Age: 74
End: 2024-10-04
Payer: MEDICARE

## 2024-10-04 DIAGNOSIS — I26.99 PULMONARY EMBOLISM, UNSPECIFIED CHRONICITY, UNSPECIFIED PULMONARY EMBOLISM TYPE, UNSPECIFIED WHETHER ACUTE COR PULMONALE PRESENT (MULTI): Primary | ICD-10-CM

## 2024-10-04 LAB
POC INR: 2.3
POC PROTHROMBIN TIME: NORMAL

## 2024-10-04 PROCEDURE — 85610 PROTHROMBIN TIME: CPT | Mod: QW

## 2024-10-04 PROCEDURE — 99211 OFF/OP EST MAY X REQ PHY/QHP: CPT

## 2024-10-04 NOTE — PROGRESS NOTES
Patient identification verified with 2 identifiers.    Location: [facility] at [facility] - [address] [address] [phone] option #1     Referring Physician: DR. LAURENCE PHAM  Enrollment/ Re-enrollment date: 2025   INR Goal: 2.0-3.0  INR monitoring is per AMS protocol.  Anticoagulation Medication: warfarin  Indication: Pulmonary Embolism (PE)    Subjective   Bleeding signs/symptoms: No    Bruising: No   Major bleeding event: No  Thrombosis signs/symptoms: No  Thromboembolic event: No  Missed doses: No  Extra doses: No  Medication changes: No  Dietary changes: No  Change in health: No  Change in activity: No  Alcohol: No  Other concerns: No    Upcoming Procedures:  Does the Patient Have any upcoming procedures that require interruption in anticoagulation therapy? no  Does the patient require bridging? no      Anticoagulation Summary  As of 10/4/2024      INR goal:  2.0-3.0   TTR:  44.7% (11.9 mo)   INR used for dosin.30 (10/4/2024)   Weekly warfarin total:  33.75 mg               Assessment/Plan   Therapeutic     1. New dose: no change    2. Next INR: 2 weeks      Education provided to patient during the visit:  Patient instructed to call in interim with questions, concerns and changes.   Patient educated on compliance with dosing, follow up appointments, and prescribed plan of care.

## 2024-10-18 ENCOUNTER — ANTICOAGULATION - WARFARIN VISIT (OUTPATIENT)
Dept: CARDIOLOGY | Facility: CLINIC | Age: 74
End: 2024-10-18
Payer: MEDICARE

## 2024-10-18 DIAGNOSIS — I26.99 PULMONARY EMBOLISM, UNSPECIFIED CHRONICITY, UNSPECIFIED PULMONARY EMBOLISM TYPE, UNSPECIFIED WHETHER ACUTE COR PULMONALE PRESENT (MULTI): Primary | ICD-10-CM

## 2024-10-18 LAB
POC INR: 2.9
POC PROTHROMBIN TIME: NORMAL

## 2024-10-18 PROCEDURE — 99211 OFF/OP EST MAY X REQ PHY/QHP: CPT

## 2024-10-18 PROCEDURE — 85610 PROTHROMBIN TIME: CPT | Mod: QW

## 2024-10-18 NOTE — PROGRESS NOTES
FUV - Last seen 8/6/2024. History of recent minimally invasive L2-3 and L3-4 laminectomy, partial medial facetectomies bialteral and kyphoplasty done 6/26/24 for L4 compression fracture and spinal stenosis of lumbar region with neurogenic claudication. Pt developed LE DVT after discharge and was placed back on anticoagulation (Coumadin). He was referred to Physical therapy and pain management at last visit for continued significant low back pain with walking and standing. Is working with Dr. Shen Mejia with PM.

## 2024-10-18 NOTE — PROGRESS NOTES
Patient identification verified with 2 identifiers.    Location: [facility] at [facility] - [address] [address] [phone] option #1     Referring Physician: DR. LAURENCE PHAM  Enrollment/ Re-enrollment date: 2025   INR Goal: 2.0-3.0  INR monitoring is per AMS protocol.  Anticoagulation Medication: warfarin  Indication: Pulmonary Embolism (PE)    Subjective   Bleeding signs/symptoms: No    Bruising: No   Major bleeding event: No  Thrombosis signs/symptoms: No  Thromboembolic event: No  Missed doses: No  Extra doses: No  Medication changes: No  Dietary changes: No  Change in health: No  Change in activity: No  Alcohol: No  Other concerns: No    Upcoming Procedures:  Does the Patient Have any upcoming procedures that require interruption in anticoagulation therapy? no  Does the patient require bridging? no      Anticoagulation Summary  As of 10/18/2024      INR goal:  2.0-3.0   TTR:  46.8% (1 y)   INR used for dosin.90 (10/18/2024)   Weekly warfarin total:  33.75 mg               Assessment/Plan   Therapeutic     1. New dose: no change    2. Next INR: 2 weeks      Education provided to patient during the visit:  Patient instructed to call in interim with questions, concerns and changes.   Patient educated on compliance with dosing, follow up appointments, and prescribed plan of care.

## 2024-10-22 ENCOUNTER — APPOINTMENT (OUTPATIENT)
Dept: NEUROSURGERY | Facility: CLINIC | Age: 74
End: 2024-10-22
Payer: MEDICARE

## 2024-11-01 ENCOUNTER — APPOINTMENT (OUTPATIENT)
Dept: CARDIOLOGY | Facility: CLINIC | Age: 74
End: 2024-11-01
Payer: MEDICARE

## 2024-11-01 ENCOUNTER — ANTICOAGULATION - WARFARIN VISIT (OUTPATIENT)
Dept: CARDIOLOGY | Facility: CLINIC | Age: 74
End: 2024-11-01
Payer: MEDICARE

## 2024-11-01 DIAGNOSIS — I26.99 PULMONARY EMBOLISM, UNSPECIFIED CHRONICITY, UNSPECIFIED PULMONARY EMBOLISM TYPE, UNSPECIFIED WHETHER ACUTE COR PULMONALE PRESENT (MULTI): Primary | ICD-10-CM

## 2024-11-07 ENCOUNTER — OFFICE VISIT (OUTPATIENT)
Dept: PAIN MEDICINE | Facility: HOSPITAL | Age: 74
End: 2024-11-07
Payer: MEDICARE

## 2024-11-07 DIAGNOSIS — M54.16 LUMBAR RADICULOPATHY: ICD-10-CM

## 2024-11-07 DIAGNOSIS — M96.1 FAILED BACK SURGICAL SYNDROME: Primary | ICD-10-CM

## 2024-11-07 PROCEDURE — 99214 OFFICE O/P EST MOD 30 MIN: CPT | Performed by: ANESTHESIOLOGY

## 2024-11-07 ASSESSMENT — PAIN SCALES - GENERAL: PAINLEVEL_OUTOF10: 8

## 2024-11-08 ENCOUNTER — ANTICOAGULATION - WARFARIN VISIT (OUTPATIENT)
Dept: CARDIOLOGY | Facility: CLINIC | Age: 74
End: 2024-11-08
Payer: MEDICARE

## 2024-11-08 DIAGNOSIS — I26.99 PULMONARY EMBOLISM, UNSPECIFIED CHRONICITY, UNSPECIFIED PULMONARY EMBOLISM TYPE, UNSPECIFIED WHETHER ACUTE COR PULMONALE PRESENT (MULTI): Primary | ICD-10-CM

## 2024-11-08 LAB
POC INR: 2.2
POC PROTHROMBIN TIME: NORMAL

## 2024-11-08 PROCEDURE — 99211 OFF/OP EST MAY X REQ PHY/QHP: CPT

## 2024-11-08 PROCEDURE — 85610 PROTHROMBIN TIME: CPT | Mod: QW

## 2024-11-08 NOTE — PROGRESS NOTES
Patient identification verified with 2 identifiers.    Location: [facility] at [facility] - [address] [address] [phone] option #1     Referring Physician: DR. LAURENCE HPAM  Enrollment/ Re-enrollment date: 2025   INR Goal: 2.0-3.0  INR monitoring is per AMS protocol.  Anticoagulation Medication: warfarin  Indication: Pulmonary Embolism (PE)    Subjective   Bleeding signs/symptoms: No    Bruising: No   Major bleeding event: No  Thrombosis signs/symptoms: No  Thromboembolic event: No  Missed doses: No  Extra doses: No  Medication changes: No  Dietary changes: No  Change in health: No  Change in activity: No  Alcohol: No  Other concerns: No    Upcoming Procedures:  Does the Patient Have any upcoming procedures that require interruption in anticoagulation therapy? YES  Does the patient require bridging? NO YET KNOWN      Anticoagulation Summary  As of 2024      INR goal:  2.0-3.0   TTR:  49.7% (1.1 y)   INR used for dosin.20 (2024)   Weekly warfarin total:  33.75 mg               Assessment/Plan   Therapeutic     1. New dose: no change    2. Next INR: 3 WEEKS  PT SCHEDULED FOR BACK PROCEDURE . HE IS NOT SURE HOW LONG HE WILL HOLD WARFARIN. HE HAS AN APPOINTMENT WITH DR. PHAM 12/3 TO DISCUSS.    Education provided to patient during the visit:  Patient instructed to call in interim with questions, concerns and changes.   Patient educated on compliance with dosing, follow up appointments, and prescribed plan of care.

## 2024-11-22 ENCOUNTER — APPOINTMENT (OUTPATIENT)
Dept: RADIOLOGY | Facility: HOSPITAL | Age: 74
End: 2024-11-22
Payer: MEDICARE

## 2024-11-22 PROCEDURE — 93971 EXTREMITY STUDY: CPT | Mod: FOREIGN READ | Performed by: RADIOLOGY

## 2024-11-22 PROCEDURE — 93971 EXTREMITY STUDY: CPT

## 2024-11-22 PROCEDURE — 73562 X-RAY EXAM OF KNEE 3: CPT | Mod: RT

## 2024-11-22 PROCEDURE — 99284 EMERGENCY DEPT VISIT MOD MDM: CPT | Mod: 25

## 2024-11-22 PROCEDURE — 73562 X-RAY EXAM OF KNEE 3: CPT | Mod: RIGHT SIDE | Performed by: RADIOLOGY

## 2024-11-22 ASSESSMENT — PAIN DESCRIPTION - PAIN TYPE: TYPE: ACUTE PAIN

## 2024-11-22 ASSESSMENT — PAIN DESCRIPTION - ORIENTATION: ORIENTATION: RIGHT

## 2024-11-22 ASSESSMENT — PAIN - FUNCTIONAL ASSESSMENT: PAIN_FUNCTIONAL_ASSESSMENT: 0-10

## 2024-11-22 ASSESSMENT — PAIN DESCRIPTION - LOCATION: LOCATION: LEG

## 2024-11-22 ASSESSMENT — PAIN SCALES - GENERAL: PAINLEVEL_OUTOF10: 2

## 2024-11-23 ENCOUNTER — HOSPITAL ENCOUNTER (EMERGENCY)
Facility: HOSPITAL | Age: 74
Discharge: HOME | End: 2024-11-23
Attending: EMERGENCY MEDICINE
Payer: MEDICARE

## 2024-11-23 VITALS
OXYGEN SATURATION: 98 % | HEART RATE: 84 BPM | SYSTOLIC BLOOD PRESSURE: 142 MMHG | RESPIRATION RATE: 17 BRPM | TEMPERATURE: 97.9 F | BODY MASS INDEX: 30.07 KG/M2 | HEIGHT: 72 IN | DIASTOLIC BLOOD PRESSURE: 92 MMHG | WEIGHT: 222 LBS

## 2024-11-23 DIAGNOSIS — I82.431 DEEP VEIN THROMBOSIS (DVT) OF POPLITEAL VEIN OF RIGHT LOWER EXTREMITY, UNSPECIFIED CHRONICITY (MULTI): Primary | ICD-10-CM

## 2024-11-23 DIAGNOSIS — R03.0 ELEVATED BLOOD PRESSURE READING: ICD-10-CM

## 2024-11-23 LAB
ALBUMIN SERPL BCP-MCNC: 3.9 G/DL (ref 3.4–5)
ALP SERPL-CCNC: 59 U/L (ref 33–136)
ALT SERPL W P-5'-P-CCNC: 20 U/L (ref 10–52)
ANION GAP SERPL CALC-SCNC: 10 MMOL/L (ref 10–20)
APTT PPP: 43 SECONDS (ref 27–38)
AST SERPL W P-5'-P-CCNC: 30 U/L (ref 9–39)
BASOPHILS # BLD AUTO: 0.02 X10*3/UL (ref 0–0.1)
BASOPHILS NFR BLD AUTO: 0.5 %
BILIRUB SERPL-MCNC: 0.5 MG/DL (ref 0–1.2)
BUN SERPL-MCNC: 14 MG/DL (ref 6–23)
CALCIUM SERPL-MCNC: 8.5 MG/DL (ref 8.6–10.3)
CHLORIDE SERPL-SCNC: 106 MMOL/L (ref 98–107)
CO2 SERPL-SCNC: 27 MMOL/L (ref 21–32)
CREAT SERPL-MCNC: 1.33 MG/DL (ref 0.5–1.3)
EGFRCR SERPLBLD CKD-EPI 2021: 55 ML/MIN/1.73M*2
EOSINOPHIL # BLD AUTO: 0.21 X10*3/UL (ref 0–0.4)
EOSINOPHIL NFR BLD AUTO: 4.8 %
ERYTHROCYTE [DISTWIDTH] IN BLOOD BY AUTOMATED COUNT: 15.4 % (ref 11.5–14.5)
GLUCOSE SERPL-MCNC: 99 MG/DL (ref 74–99)
HCT VFR BLD AUTO: 40.8 % (ref 41–52)
HGB BLD-MCNC: 12.9 G/DL (ref 13.5–17.5)
IMM GRANULOCYTES # BLD AUTO: 0.01 X10*3/UL (ref 0–0.5)
IMM GRANULOCYTES NFR BLD AUTO: 0.2 % (ref 0–0.9)
INR PPP: 2.8 (ref 0.9–1.1)
LYMPHOCYTES # BLD AUTO: 1.48 X10*3/UL (ref 0.8–3)
LYMPHOCYTES NFR BLD AUTO: 34 %
MCH RBC QN AUTO: 29.2 PG (ref 26–34)
MCHC RBC AUTO-ENTMCNC: 31.6 G/DL (ref 32–36)
MCV RBC AUTO: 92 FL (ref 80–100)
MONOCYTES # BLD AUTO: 0.34 X10*3/UL (ref 0.05–0.8)
MONOCYTES NFR BLD AUTO: 7.8 %
NEUTROPHILS # BLD AUTO: 2.29 X10*3/UL (ref 1.6–5.5)
NEUTROPHILS NFR BLD AUTO: 52.7 %
NRBC BLD-RTO: 0 /100 WBCS (ref 0–0)
PLATELET # BLD AUTO: 194 X10*3/UL (ref 150–450)
POTASSIUM SERPL-SCNC: 4.5 MMOL/L (ref 3.5–5.3)
PROT SERPL-MCNC: 6.6 G/DL (ref 6.4–8.2)
PROTHROMBIN TIME: 31.6 SECONDS (ref 9.8–12.8)
RBC # BLD AUTO: 4.42 X10*6/UL (ref 4.5–5.9)
SODIUM SERPL-SCNC: 138 MMOL/L (ref 136–145)
WBC # BLD AUTO: 4.4 X10*3/UL (ref 4.4–11.3)

## 2024-11-23 PROCEDURE — 85610 PROTHROMBIN TIME: CPT

## 2024-11-23 PROCEDURE — 84075 ASSAY ALKALINE PHOSPHATASE: CPT

## 2024-11-23 PROCEDURE — 85025 COMPLETE CBC W/AUTO DIFF WBC: CPT

## 2024-11-23 PROCEDURE — 36415 COLL VENOUS BLD VENIPUNCTURE: CPT

## 2024-11-23 NOTE — ED PROVIDER NOTES
HPI   Chief Complaint   Patient presents with    Leg Pain       74-year-old male past medical history of PE and DVTs anticoagulated on warfarin presents the ED today with a chief concern of right knee pain.  Patient reports symptoms started yesterday.  No known trauma or injury to the area.  Reports the pain is located diffusely throughout the right knee as well as in the popliteal fossa on the right lower extremity.  He denies any radiation of the pain.  Describes the pain as aching and throbbing.  Nothing makes it better or worse.  Has not taken anything at home for his symptoms.  He is concerned that he may have another DVT.  He denies any chest pain or shortness of breath. No Syncope.  Has not missed any doses of medication.  He has no other symptoms or concerns at this time.      History provided by:  Patient   used: No            Patient History   Past Medical History:   Diagnosis Date    Anemia     Arrhythmia     SVT/ PVCs-(Min Feb-March2022), AFIB    Arthritis     Forrest's esophagus     CKD (chronic kidney disease)     stage III    Erectile dysfunction     GERD (gastroesophageal reflux disease)     HTN (hypertension)     Low back pain     Lumbar compression fracture (Multi)     Lumbar spondylosis     PE (pulmonary thromboembolism) (Multi) 2013    on Coumadin    Prostate cancer (Multi)     s/p RT follows with Paul Navarrete, CNP    Spinal stenosis     Syncope      Past Surgical History:   Procedure Laterality Date    COLONOSCOPY      ESOPHAGOGASTRODUODENOSCOPY      OTHER SURGICAL HISTORY  04/23/2019    Knee surgery    OTHER SURGICAL HISTORY  04/23/2019    Shoulder surgery    OTHER SURGICAL HISTORY  04/23/2019    Gallbladder surgery     Family History   Problem Relation Name Age of Onset    Cancer Mother      Heart attack Father       Social History     Tobacco Use    Smoking status: Former     Current packs/day: 0.00     Types: Cigarettes     Quit date: 5/1/2019     Years since  quittin.5     Passive exposure: Past    Smokeless tobacco: Never   Vaping Use    Vaping status: Never Used   Substance Use Topics    Alcohol use: Yes     Comment: 10 drinks a month    Drug use: Never       Physical Exam   ED Triage Vitals [24]   Temperature Heart Rate Respirations BP   36.6 °C (97.9 °F) 80 18 (!) 148/110      Pulse Ox Temp Source Heart Rate Source Patient Position   98 % Temporal -- Sitting      BP Location FiO2 (%)     Left arm --       Physical Exam  Constitutional: Vital signs per nursing notes.  Well developed, well nourished.  No acute distress.    Eyes:  conjunctivae and lids normal  ENT: Ears normal externally; face symmetric. voice normal  Neck: neck supple, no meningismus; trachea midline without deviation  Respiratory: normal respiratory effort and excursion; no rales, rhonchi, or wheezes; equal air entry  Cardiovascular: RRR, 2+ pulses extremities   Neurological: normal speech; CN II-XII grossly intact, normal motor and sensory function  GI: no distention, soft, nontender  : Deferred  Musculoskeletal: normal gait and station; normal digits and nails; full ROM of the right knee with no focal bony tenderness palpation.  There is mild tenderness over the right popliteal fossa.  No overlying skin changes on the right knee.  No pain with passive range of motion of the right knee.  5/5 strength in lower extremities bilaterally.  Sensation intact in lower extremities bilaterally.  2+ symmetric dorsalis pedis pulses and posterior tibial pulses.  Compartments are soft.  No cyanosis.    Skin: normal to inspection; normal to palpation; no rash      ED Course & MDM   ED Course as of 24 0514   Sat 2024   0126 IMPRESSION:  Evidence for nonocclusive DVT within the right popliteal vein.   Compared to prior ultrasound, the thrombus burden appears reduced.   The previously visualized right peroneal and posterior tibial DVT is  not appreciated.  Multiple prominent right  inguinal lymph nodes  Signed by Konstantin Mendieta DO   []   0126 IMPRESSION:  No acute fracture or malalignment.      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      Small knee joint effusion.          MACRO:  None      Signed by: Treva Kay 11/22/2024 11:00 PM  Dictation workstation:   [id number]           []   0451 Labs personally interpreted by me.  CBC shows no evidence of leukocytosis.  Normal liver function.  INR 2.8 [MC]      ED Course User Index  [] Kumar Oliva PA-C         Diagnoses as of 11/23/24 0514   Deep vein thrombosis (DVT) of popliteal vein of right lower extremity, unspecified chronicity (Multi)   Elevated blood pressure reading                 No data recorded                                 Medical Decision Making  74-year-old male past medical history of PE and DVTs anticoagulated on warfarin presents the ED today with a chief concern of right knee pain.  Vital signs reassuring.  Patient overall appears well and is nontoxic-appearing.  Patient has full range of motion of the neck without any meningismus.  Satting well on room air.  Not hypoxic.  Not tachycardic.  Afebrile.  Does have a DVT that is nonocclusive in the right popliteal vein.  Also seen are intact in the lower extremities bilaterally.  Not concerned for any PE at this time.  Do not think further workup with D-dimer or CT PE study is indicated at this time.  Labs reassuring as noted above.  I personally interpreted labs.  X-ray of the right knee does show osteoarthritis as well as findings suggestive of CPPD and a small knee joint effusion.  There is no pain with passive range of motion of the knee and no overlying skin changes.  I am not concerned for any septic joint at this time.  Attending physician also saw and evaluated the patient.  Did not feel admission was necessary with a INR of 2.8.  Discussed my pression and findings with patient he feels comfortable  returning home.  We discussed very strict return precautions include returning for any new or worsening signs home.  Patient is in agreement this plan.  He will follow-up with vascular and PCP within 3 days.  Again discussed tricked return precautions.  He will continue warfarin as prescribed.    Differential diagnosis: Ligamentous injury, fracture, dislocation, abscess, cellulitis, lymphangitis, DVT, acute arterial occlusion, necrotizing fasciitis, arthritis, crystal arthropathy, septic arthritis, tendon rupture     disposition/treatment  1.  See diagnosis    Shared decision-making was used patient feels comfortable returning home     Patient was advised to follow up with recommended provider in 1 day1 for another evaluation and exam. I advised patient/guardian to return or go to closest emergency room immediately if symptoms change, get worse, new symptoms develop prior to follow up. If there is no improvement in symptoms in the next 24 hours they are advised to return for further evaluation and exam. I also explained the plan and treatment course. Patient/guardian is in agreement with plan, treatment course, and follow up and states verbally that they will comply.    Homegoing. I discussed the differential; results and discharge plan with the patient and/or family/friend/caregiver if present.  I emphasized the importance of follow-up with the physician I referred them to in the timeframe recommended.  I explained reasons for the patient to return to the Emergency Department. They agreed that if they feel their condition is worsening or if they have any other concern they should call 911 immediately for further assistance. I gave the patient an opportunity to ask all questions they had and answered all of them accordingly. They understand return precautions and discharge instructions. The patient and/or family/friend/caregiver expressed understanding verbally and that they would comply.        This note has been  transcribed using voice recognition and may contain grammatical errors, misplaced words, incorrect words, incorrect phrases or other errors.        Procedure  Procedures     Kumar Oliva PA-C  11/23/24 0514

## 2024-11-23 NOTE — ED TRIAGE NOTES
TRIAGE NOTE   I saw the patient as the Clinician in Triage and performed a brief history and physical exam, established acuity, and ordered appropriate tests to develop basic plan of care. Patient will be seen by an FLORIDALMA, resident and/or physician who will independently evaluate the patient. Please see subsequent provider notes for further details and disposition.     Brief HPI: In brief, Louis Rainey is a 74 y.o. male that presents for right knee pain and right lower extremity pain.  States history of DVT.  He is on Coumadin.  He has not missed any doses.  States he developed the pain a couple of days ago and it feels similar to when he is had blood clots in the past.  Denies any injury or trauma to the knee..     Focused Physical exam:   Alert, oriented, neurologically and neurovascularly intact, no signs of erythema, warmth or swelling to the knee    Plan/MDM:   Right knee x-ray, right lower extremity ultrasound    Please see subsequent provider note for further details and disposition

## 2024-11-27 ENCOUNTER — ANTICOAGULATION - WARFARIN VISIT (OUTPATIENT)
Dept: CARDIOLOGY | Facility: CLINIC | Age: 74
End: 2024-11-27
Payer: MEDICARE

## 2024-11-27 DIAGNOSIS — I26.99 PULMONARY EMBOLISM, UNSPECIFIED CHRONICITY, UNSPECIFIED PULMONARY EMBOLISM TYPE, UNSPECIFIED WHETHER ACUTE COR PULMONALE PRESENT (MULTI): Primary | ICD-10-CM

## 2024-11-27 LAB
POC INR: 2.9
POC PROTHROMBIN TIME: NORMAL

## 2024-11-27 PROCEDURE — 85610 PROTHROMBIN TIME: CPT | Mod: QW

## 2024-11-27 PROCEDURE — 99211 OFF/OP EST MAY X REQ PHY/QHP: CPT

## 2024-11-27 NOTE — PROGRESS NOTES
Patient identification verified with 2 identifiers.    Location: [facility] at [facility] - [address] [address] [phone] option #1     Referring Physician: DR. LAURENCE PHAM  Enrollment/ Re-enrollment date: 2025   INR Goal: 2.0-3.0  INR monitoring is per AMS protocol.  Anticoagulation Medication: warfarin  Indication: Pulmonary Embolism (PE)    Subjective   Bleeding signs/symptoms: No  Bruising: No   Major bleeding event: No  Thrombosis signs/symptoms: No  Thromboembolic event: No  Missed doses: No  Extra doses: No  Medication changes: No  Dietary changes: No  Change in health: No  Change in activity: No  Alcohol: No  Other concerns: No    Upcoming Procedures:  Does the Patient Have any upcoming procedures that require interruption in anticoagulation therapy? no  Does the patient require bridging? no      Anticoagulation Summary  As of 2024      INR goal:  2.0-3.0   TTR:  52.0% (1.1 y)   INR used for dosin.90 (2024)   Weekly warfarin total:  33.75 mg               Assessment/Plan   Therapeutic     1. New dose: no change    2. Next INR: 1 month      Education provided to patient during the visit:  Patient instructed to call in interim with questions, concerns and changes.   Patient educated on interactions between medications and warfarin.   Patient educated on dietary consistency in vitamin k consumption.   Patient educated on affects of alcohol consumption while taking warfarin.   Patient educated on signs of bleeding/clotting.   Patient educated on compliance with dosing, follow up appointments, and prescribed plan of care.

## 2024-12-03 ENCOUNTER — OFFICE VISIT (OUTPATIENT)
Dept: CARDIOLOGY | Facility: HOSPITAL | Age: 74
End: 2024-12-03
Payer: MEDICARE

## 2024-12-03 ENCOUNTER — APPOINTMENT (OUTPATIENT)
Dept: CARDIOLOGY | Facility: HOSPITAL | Age: 74
End: 2024-12-03
Payer: MEDICARE

## 2024-12-03 VITALS
WEIGHT: 226 LBS | SYSTOLIC BLOOD PRESSURE: 126 MMHG | BODY MASS INDEX: 30.61 KG/M2 | HEART RATE: 94 BPM | RESPIRATION RATE: 16 BRPM | DIASTOLIC BLOOD PRESSURE: 84 MMHG | HEIGHT: 72 IN

## 2024-12-03 DIAGNOSIS — Z86.711 HISTORY OF PULMONARY EMBOLISM: ICD-10-CM

## 2024-12-03 DIAGNOSIS — I80.201: Primary | ICD-10-CM

## 2024-12-03 DIAGNOSIS — Z79.01 CHRONIC ANTICOAGULATION: ICD-10-CM

## 2024-12-03 PROCEDURE — 99214 OFFICE O/P EST MOD 30 MIN: CPT | Performed by: INTERNAL MEDICINE

## 2024-12-03 RX ORDER — ESOMEPRAZOLE MAGNESIUM 40 MG/1
40 CAPSULE, DELAYED RELEASE ORAL
COMMUNITY
Start: 2024-11-05

## 2024-12-03 ASSESSMENT — PAIN SCALES - GENERAL: PAINLEVEL_OUTOF10: 4

## 2024-12-03 NOTE — PROGRESS NOTES
OUTPATIENT FOLLOW-UP -  VASCULAR MEDICINE    DOS:  12/3/24  Last seen:    7/12/24    REQUESTING PHYSICIAN:  Dr. Hector Mejia    REASON FOR FOLLOW-UP:  here for follow up after recent right LE DVT    HISTORY OF PRESENT ILLNESS:     73 yo man here for follow up after recent right LE DVT July 2024. This was in the setting of a subtherapeutic INR following MIS  L2-3, L4-3 lami, L4 kyphoplasty. -This was done minimally invasively and admitted x 1 day. Restarted warfarin 5 days after surgery (7/1). Rec pre-op were to hold 5 days pre and resume within 2-3 days. 6/30 was seen in the ED for CP - felt to be MS.  7/5 INR was  1.2.  This was after 4 doses by his report. 7/5 developed RLE pain and swelling - in ED US +There is noncompressibility with intraluminal echogenic material and  severely reduced, but preserved flow within the popliteal and  partially visualized posterior tibial and peroneal veins. CT was done 6/30 at the ED visit for CP - neg for PE. INR was 2.3 on 7/10.    Now pending an injection on Friday - has been off the AC x 2 days currently. Has been on warfarin. No bleeding on the AC. Using the med-alert bracelet. INR followed by [facility]. INRs recently have been stable.    Recently seen at [facility] for leg pain. US:IMPRESSION:  Evidence for nonocclusive DVT within the right popliteal vein.   Compared to prior ultrasound, the thrombus burden appears reduced. The previously visualized right peroneal and posterior tibial DVT is not appreciated. 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 Remains on warfarin. Las visit was going to start xarelto but this was reportedly too expensive. No bleeding on the warfarin. Pending back surgery June 26 for disc disease, also reports OA and fracture. This is planned for ?overnight but to be done minimally invasively. Unclear how long after th e procedure the AC will need to be interrupted. Reports prostate cancer is stable.  Reports off all meds - just followed clinically now.     REVIEW OF SYSTEMS:     Weight is stable  No CP, chest pressure  No cough, SOB  +WEBBER  No BRBPR, melena, hematuria  No bleeding  No edema, no calf pain    PHYSICAL EXAMINATION:   /84 (BP Location: Left arm, Patient Position: Sitting)   Pulse 94   Resp 16   Ht 1.829 m (6')   Wt 103 kg (226 lb)   BMI 30.65 kg/m²     Gen: Appears well, NAD  Chest: CTA  CVS: regular dysrhythmia without murmur or gallop  Ext: no edema, nontender  Skin: good condition without wounds or lesions  Pulses: DP 2+;   Mood and affect appropriate    ADDITIONAL DATA:   no compression worn  right calf: 44.5cm   left calf:  41.5cm     ASSESSMENT/PLAN:    here for follow up after recent right LE DVT - stable and doing well. Indefinite AC anticipated given the idiopathic PE and recurrent DVT during a relatively short time off AC. Discussed again options for a DOAC - this may be considered moving forward. Discussed resuming the AC based on recs from pain management - needs INR 3-4 days after resuming AC. Follow up in the spring.

## 2024-12-03 NOTE — PATIENT INSTRUCTIONS
ASSESSMENT/PLAN:    here for follow up after recent right LE DVT - stable and doing well. Indefinite AC anticipated given the idiopathic PE and recurrent DVT during a relatively short time off AC. Discussed again options for a DOAC - this may be considered moving forward. Discussed resuming the AC based on recs from pain management - needs INR 3-4 days after resuming AC. Follow up in the spring.

## 2024-12-06 ENCOUNTER — HOSPITAL ENCOUNTER (OUTPATIENT)
Facility: HOSPITAL | Age: 74
Discharge: HOME | End: 2024-12-06
Payer: MEDICARE

## 2024-12-06 VITALS
TEMPERATURE: 98.1 F | HEART RATE: 77 BPM | DIASTOLIC BLOOD PRESSURE: 99 MMHG | BODY MASS INDEX: 29.26 KG/M2 | RESPIRATION RATE: 16 BRPM | WEIGHT: 216 LBS | HEIGHT: 72 IN | SYSTOLIC BLOOD PRESSURE: 137 MMHG | OXYGEN SATURATION: 98 %

## 2024-12-06 DIAGNOSIS — M54.16 LUMBAR RADICULOPATHY: ICD-10-CM

## 2024-12-06 LAB — POCT INTERNATIONAL NORMALIZATION RATIO: 1.3

## 2024-12-06 PROCEDURE — 2550000001 HC RX 255 CONTRASTS: Performed by: ANESTHESIOLOGY

## 2024-12-06 PROCEDURE — 62323 NJX INTERLAMINAR LMBR/SAC: CPT | Performed by: ANESTHESIOLOGY

## 2024-12-06 PROCEDURE — 2500000004 HC RX 250 GENERAL PHARMACY W/ HCPCS (ALT 636 FOR OP/ED): Performed by: ANESTHESIOLOGY

## 2024-12-06 RX ORDER — LIDOCAINE HYDROCHLORIDE 5 MG/ML
INJECTION, SOLUTION INFILTRATION; INTRAVENOUS
Status: COMPLETED | OUTPATIENT
Start: 2024-12-06 | End: 2024-12-06

## 2024-12-06 RX ORDER — LIDOCAINE HYDROCHLORIDE 5 MG/ML
INJECTION, SOLUTION INFILTRATION; INTRAVENOUS
Status: DISPENSED
Start: 2024-12-06 | End: 2024-12-06

## 2024-12-06 RX ORDER — METHYLPREDNISOLONE ACETATE 40 MG/ML
INJECTION, SUSPENSION INTRA-ARTICULAR; INTRALESIONAL; INTRAMUSCULAR; SOFT TISSUE
Status: DISPENSED
Start: 2024-12-06 | End: 2024-12-06

## 2024-12-06 RX ORDER — METHYLPREDNISOLONE ACETATE 40 MG/ML
INJECTION, SUSPENSION INTRA-ARTICULAR; INTRALESIONAL; INTRAMUSCULAR; SOFT TISSUE
Status: COMPLETED | OUTPATIENT
Start: 2024-12-06 | End: 2024-12-06

## 2024-12-06 ASSESSMENT — PAIN SCALES - GENERAL
PAINLEVEL_OUTOF10: 0 - NO PAIN
PAINLEVEL_OUTOF10: 5 - MODERATE PAIN

## 2024-12-06 ASSESSMENT — COLUMBIA-SUICIDE SEVERITY RATING SCALE - C-SSRS
6. HAVE YOU EVER DONE ANYTHING, STARTED TO DO ANYTHING, OR PREPARED TO DO ANYTHING TO END YOUR LIFE?: NO
1. IN THE PAST MONTH, HAVE YOU WISHED YOU WERE DEAD OR WISHED YOU COULD GO TO SLEEP AND NOT WAKE UP?: NO
2. HAVE YOU ACTUALLY HAD ANY THOUGHTS OF KILLING YOURSELF?: NO

## 2024-12-06 ASSESSMENT — PAIN - FUNCTIONAL ASSESSMENT
PAIN_FUNCTIONAL_ASSESSMENT: WONG-BAKER FACES
PAIN_FUNCTIONAL_ASSESSMENT: 0-10
PAIN_FUNCTIONAL_ASSESSMENT: WONG-BAKER FACES
PAIN_FUNCTIONAL_ASSESSMENT: 0-10

## 2024-12-06 NOTE — DISCHARGE INSTRUCTIONS
DISCHARGE INSTRUCTIONS FOR INJECTIONS     You underwent Caudal Epidural Steroid Injection  today    After most injections, it is recommended that you relax and limit your activity for the remainder of the day unless you have been told otherwise by your pain physician.  You should not drive a car, operate machinery, or make important legal decisions unless otherwise directed by your pain physician.  You may resume your normal activity, including exercise, tomorrow.      Keep a written pain diary of how much pain relief you experienced following the injection procedure and the length of time of pain relief you experienced pain relief. Following diagnostic injections like medial branch nerve blocks, sacroiliac joint blocks, stellate ganglion injections and other blocks, it is very important you record the specific amount of pain relief you experienced immediately after the injectionand how long it lasted. Your doctor will ask you for this information at your follow up visit.     For all injections, please keep the injection site dry and inspect the site for a couple of days. You may remove the Band-Aid the day of the injection at any time.     Some discomfort, bruising or slight swelling may occur at the injection site. This is not abnormal if it occurs.  If needed you may:    -Take over the counter medication such as Tylenol or Motrin.   -Apply an ice pack for 30 minutes, 2 to 3 times a day for the first 24 hours.     You may shower today; no soaking baths, hot tubs, whirlpools or swimming pools for two days.      If you are given steroids in your injection, it may take 3-5 days for the steroid medication to take effect. You may notice a worsening of your symptoms for 1-2 days after the injection. This is not abnormal.  You may use acetaminophen, ibuprofen, or prescription medication that your doctor may have prescribed for you if you need to do so.     A few common side effects of steroids include facial flushing,  sweating, restlessness, irritability,difficulty sleeping, increase in blood sugar, and increased blood pressure. If you have diabetes, please monitor your blood sugar at least once a day for at least 5 days. If you have poorly controlled high blood pressure, monitoryour blood pressure for at least 2 days and contact your primary care physician if these numbers are unusually high for you.      If you take aspirin or non-steroidal anti-inflammatory drugs (examples are Motrin, Advil, ibuprofen, Naprosyn, Voltaren, Relafen, etc.) you may restart these this evening, but stop taking it 3 days before your next appointment, unless instructed otherwiseby your physician.      You do not need to discontinue non-aspirin-containing pain medications prior to an injection (examples: Celebrex, tramadol, hydrocodone and acetaminophen).      If you take a blood thinning medication (Coumadin, Lovenox, Fragmin,Ticlid, Plavix, Pradaxa, etc.), please discuss this with your primary care physician/cardiologist and your pain physician. These medications MUST be discontinued before you can have an injection safely, without the risk of uncontrolled bleeding. If these medications are not discontinued for an appropriate period of time, you will not be able to receivean injection. Please adhere to instructions given to you about when to restart your blood thinning medication. If you have any questions please reach out to our team.    If you are taking Coumadin, please have your INR checked the morning of your procedure and bring the result to your appointment unless otherwise instructed. If your INR is over 1.2, your injection may need to be rescheduled to avoid uncontrolled bleeding from the needle placement.     Call [facility]  and ask for Pain Management at [phone] between 8am-4pm Monday - Friday if you are experiencing the following:    If you received an epidural or spinal injection:    -Headache that doesnot go away with medicine, is  worse when sitting or standing up, and is greatly relieved upon lying down.   -Severe pain worse than or different than your baseline pain.   -Chills or fever (101º F or greater).   -Drainage or signs of infection at the injection site     Go directly to the Emergency Department if you are experiencing the following and received an epidural or spinal injection:   -Abrupt weakness or progressive weakness in your legs that starts after you leave the clinic.   -Abrupt severe or worsening numbness in your legs.   -Inability to urinate after the injection or loss of bowel or bladder control without the urge to defecate or urinate.     If you have a clinical question that cannot wait until your next appointment, please call [phone] between 8am-4pm Monday - Friday or send a [employer] message. We do our best to return all non-emergency messages within 24 hours, Monday - Friday. A nurse or physician will return your message. You may also the appropriate nurse below, and they will do their best to answer your questions.  - For Dr. Gao, call Philomena at [phone]  - For Dr. Templeton, call Andree at [phone]  - For Dr. Hinojosa, call Andree at [phone]  - For Dr. Camilo, call Summer at [phone]  - For Dr. Parks, call Summer at [phone]    If you need to cancel an appointment, please call the scheduling staff at [phone] during normal business hours or leave a message at least 24 hours in advance.     If you are going to be sedated for your next procedure, you MUST have responsible adult who can legally drive accompany you home. You cannot eat or drink for at least eight hours prior to the planned procedure if you are going to receive sedation. You may take your non-blood thinning medications with a small sip of water.

## 2024-12-06 NOTE — H&P
HISTORY AND PHYSICAL    History Of Present Illness  Louis Rainey is a 74 y.o. male presenting with chronic pain here for procedure as stated below. Patient denies any changes to health since the last visit to our clinic.    Past Medical History  Past Medical History:   Diagnosis Date    Anemia     Arrhythmia     SVT/ PVCs-([place] Feb-March2022), AFIB    Arthritis     Forrest's esophagus     CKD (chronic kidney disease)     stage III    Erectile dysfunction     GERD (gastroesophageal reflux disease)     HTN (hypertension)     Low back pain     Lumbar compression fracture (Multi)     Lumbar spondylosis     PE (pulmonary thromboembolism) (Multi) 2013    on Coumadin    Prostate cancer (Multi)     s/p RT follows with Paul Navarrete, CNP    Spinal stenosis     Syncope        Surgical History  Past Surgical History:   Procedure Laterality Date    COLONOSCOPY      ESOPHAGOGASTRODUODENOSCOPY      OTHER SURGICAL HISTORY  04/23/2019    Knee surgery    OTHER SURGICAL HISTORY  04/23/2019    Shoulder surgery    OTHER SURGICAL HISTORY  04/23/2019    Gallbladder surgery        Social History  He reports that he quit smoking about 5 years ago. His smoking use included cigarettes. He has been exposed to tobacco smoke. He has never used smokeless tobacco. He reports current alcohol use. He reports that he does not use drugs.    Family History  Family History   Problem Relation Name Age of Onset    Cancer Mother      Heart attack Father          Allergies  Cefazolin sodium, Cephalexin, and Cephalosporins    Review of Systems  12 point ROS done and negative except for the above.     Physical Exam  General: NAD, well groomed, well nourished  Eyes: Non-icteric sclera, EOMI  Ears, Nose, Mouth, and Throat: External ears and nose appear to be without deformity or rash. No lesions or masses noted. Hearing is grossly intact.   Neck: Trachea midline  Respiratory: Nonlabored breathing   Cardiovascular: No peripheral edema   Skin: No rashes  or open lesions/ulcers identified on skin.      Last Recorded Vitals  There were no vitals taken for this visit.    Relevant Results  Current Outpatient Medications   Medication Instructions    amLODIPine (NORVASC) 5 mg, Daily    calcium carbonate (Oscal) 500 mg calcium (1,250 mg) tablet 1 tablet, Daily    cholecalciferol (Vitamin D-3) 10 mcg (400 unit) capsule 1 capsule, Daily    cyanocobalamin (VITAMIN B-12) 1,000 mcg, Daily    esomeprazole (NEXIUM) 40 mg, Daily before breakfast    multivitamin tablet 1 tablet, Daily    warfarin (Coumadin) 7.5 mg tablet Take as directed per After Visit Summary.         MR lumbar spine wo IV contrast 04/03/2024    Narrative  Interpreted By:  Sky Reeves,  and Miki West  STUDY:  MR LUMBAR SPINE WO IV CONTRAST;  4/3/2024 4:30 pm    INDICATION:  Signs/Symptoms:low back pain.    COMPARISON:  CT lumbar spine 11/23/2023    ACCESSION NUMBER(S):  [id number]    ORDERING CLINICIAN:  ROQUE CHACON    TECHNIQUE:  Sagittal T1, T2, STIR, axial T1 and T2 weighted images of the lumbar  spine were acquired.    FINDINGS:  There are 5 lumbar type vertebrae, with numeration in keeping with  prior exams.    Alignment: Minimal grade 1 anterolisthesis of L5 on S1.    Vertebrae/Intervertebral Discs: New compared to prior CT, a  compression deformity of the L4 superior endplate is noted with  associated marrow edema and approximately 40-45% vertebral body  height loss. The marrow edema extends into the posterior elements of  L4. No evidence of retropulsion. The remainder of the vertebral body  heights are intact.    Conus medullaris and Cauda Equina: The conus medullaris terminates at  L1. There is crowding of cauda equina roots at L2-3 and L3-4 as  result of advanced degenerative changes described below.    T10-11: Sagittal images demonstrate no significant central canal or  neural foraminal stenosis.    T11-12: Sagittal images demonstrate no significant central canal or  neural foraminal  stenosis.    T12-L1: No significant central canal or neural foraminal stenosis.    L1-L2: Mild bilateral lateral recess stenosis and mild left greater  than right neural foraminal stenosis due to mild circumferential disc  bulge and thickening of the ligamentum flavum.    L2-L3: Near-complete circumferential effacement of the thecal sac as  result of broad-based posterior disc bulge, thickening of the  ligamentum flavum, and facet hypertrophy. Thus, there is severe  central canal stenosis and compression of the cauda equina nerve  roots. Severe lateral recess stenosis. Mild left neural foraminal  stenosis.    L3-L4: Complete circumferential effacement of the thecal sac as  result of broad-based posterior disc bulge, thickening of the  ligamentum flavum, and facet hypertrophy. Thus, there is severe  central canal stenosis and compression of the cauda equina nerve  roots. Severe lateral recess and neural foraminal stenosis.    L4-5: Moderate central canal stenosis, severe lateral recess  stenosis, and moderate right greater than left neural foraminal  stenosis as result of broad-based posterior disc bulge, thickening of  the ligamentum flavum, and facet hypertrophy.    L5-S1: Moderate lateral recess and moderate to severe neural  foraminal stenosis as result of broad-based posterior disc bulge and  facet joint hypertrophy. Annular fissuring of the intervertebral disc  is also noted (series 9, image 12).    The visualized portions of the sacrum and iliac bones demonstrate no  focal abnormality.    Edema and nonloculated fluid are noted in the posterior paraspinous  soft tissues overlying L4 and L5.    Impression  1. New acute to subacute L4 compression fracture with 40-45%  vertebral body height loss. Extension of edema into the L4 posterior  elements and overlying posterior paraspinous soft tissues. No  evidence of retropulsion. Consider CT for evaluation of the osseous  structures if clinically indicated. While  findings could be on an  insufficiency basis further follow-up examination to document marrow  signal recovery should be considered if there were clinical suspicion  of an underlying systemic process.  2. Multilevel degenerative changes of the lumbar spine as detailed  above, most severely at L2-3 and L3-4 with severe central canal  stenosis and compression of the cauda equina nerve roots.      In addition to the orange epic alert, findings were relayed via [facility]  secure chat to Dr. ROQUE CHACON at 9:39 am on 4/4/2024 by Radiology  resident Jenna Livingston MD.    MACRO:  Critical Finding:  See findings. Notification was initiated on  4/4/2024 at 9:48 am by  Jenna Livingston.  (**-OCF-**)    Signed by: Sky Reeves 4/4/2024 10:07 AM  Dictation workstation:   [id number]     No image results found.     No diagnosis found.        Assessment/Plan   Louis Rainey is a 74 y.o. male presenting with chronic pain here for Caudal epidural steroid injection; he denies any recent antibiotic use or infections, he held warfarin for 7 days, and he denies contrast or local anesthetic allergies. INR this morning was 1.3    ASA PS Classification: 2  Mallampati score: 2    Plan:  - We will proceed with the procedure as above. We discussed extensively the risks, benefits, and alternatives to the procedure. The patient's questions were addressed and answered in detail. The patient demonstrated understanding of the procedure, and is amenable to proceeding with it. The Risks of the procedure that were discussed with the patient include but are not limited to the following: A lack of efficacy, transient worsening of pain, bleeding, infection, nerve injury, nerve damage, neuritis or sunburn sensation. Informed consent obtained as attached to EMR.  - Patient to continue physician directed home exercises as tolerated.  - Patient will follow-up with us in clinic.      Ivory Martinze DO  Chronic Pain Fellow  [facility]  [facility]

## 2024-12-10 ENCOUNTER — ANTICOAGULATION - WARFARIN VISIT (OUTPATIENT)
Dept: CARDIOLOGY | Facility: CLINIC | Age: 74
End: 2024-12-10
Payer: MEDICARE

## 2024-12-10 DIAGNOSIS — I26.99 PULMONARY EMBOLISM, UNSPECIFIED CHRONICITY, UNSPECIFIED PULMONARY EMBOLISM TYPE, UNSPECIFIED WHETHER ACUTE COR PULMONALE PRESENT (MULTI): Primary | ICD-10-CM

## 2024-12-10 LAB
POC INR: 1.4
POC PROTHROMBIN TIME: NORMAL

## 2024-12-10 PROCEDURE — 99211 OFF/OP EST MAY X REQ PHY/QHP: CPT

## 2024-12-10 PROCEDURE — 85610 PROTHROMBIN TIME: CPT | Mod: QW

## 2024-12-10 NOTE — PROGRESS NOTES
Patient identification verified with 2 identifiers.    Location: [facility] at [facility] - [address] [address] [phone] option #1     Referring Physician: DR. PHAM  Enrollment/ Re-enrollment date: 25   INR Goal: 2.0-3.0  INR monitoring is per [facility] protocol.  Anticoagulation Medication: warfarin  Indication: Pulmonary Embolism (PE)    Subjective   Bleeding signs/symptoms: No    Bruising: No   Major bleeding event: No  Thrombosis signs/symptoms: No  Thromboembolic event: No  Missed doses: Yes  PT HELD WARFARIN FROM  TO .  HE RESTARTED WARFARIN AT HIS USUAL DOSE.  Extra doses: No  Medication changes: No  PT HAD BACK INJECTION ON 24.  Dietary changes: No  Change in health: No  Change in activity: No  Alcohol: No  Other concerns: No    Upcoming Procedures:  Does the Patient Have any upcoming procedures that require interruption in anticoagulation therapy? no  Does the patient require bridging? no      Anticoagulation Summary  As of 12/10/2024      INR goal:  2.0-3.0   TTR:  51.6% (1.2 y)   INR used for dosin.40 (12/10/2024)   Weekly warfarin total:  33.75 mg               Assessment/Plan   Subtherapeutic     1. New dose: no change    2. Next INR:  3 DAYS  I SENT A SECURE CHAT TO DR. PHAM AT 1:25PM AND 3:26 NOTIFYING HER OF PLAN AND ASKED HER TO EITHER CALL [facility] CLINIC OR PT DIRECTLY IF SHE WOULD LIKE A DIFFERENT PLAN.      Education provided to patient during the visit:  Patient instructed to call in interim with questions, concerns and changes.   Patient educated on interactions between medications and warfarin.   Patient educated on dietary consistency in vitamin k consumption.   Patient educated on signs of bleeding/clotting.

## 2024-12-13 ENCOUNTER — ANTICOAGULATION - WARFARIN VISIT (OUTPATIENT)
Dept: CARDIOLOGY | Facility: CLINIC | Age: 74
End: 2024-12-13
Payer: MEDICARE

## 2024-12-13 DIAGNOSIS — I26.99 PULMONARY EMBOLISM, UNSPECIFIED CHRONICITY, UNSPECIFIED PULMONARY EMBOLISM TYPE, UNSPECIFIED WHETHER ACUTE COR PULMONALE PRESENT (MULTI): Primary | ICD-10-CM

## 2024-12-13 LAB
POC INR: 1.5
POC PROTHROMBIN TIME: NORMAL

## 2024-12-13 PROCEDURE — 85610 PROTHROMBIN TIME: CPT | Mod: QW

## 2024-12-13 PROCEDURE — 99211 OFF/OP EST MAY X REQ PHY/QHP: CPT

## 2024-12-13 NOTE — PROGRESS NOTES
Patient identification verified with 2 identifiers.    Location: [facility] at [facility] - [address] [address] [phone] option #1     Referring Physician: Dr. Marla Vela  Enrollment/ Re-enrollment date: 25   INR Goal: 2.0-3.0  INR monitoring is per [facility]MS protocol.  Anticoagulation Medication: warfarin  Indication: Pulmonary Embolism (PE)    Subjective   Bleeding signs/symptoms: No    Bruising: No   Major bleeding event: No  Thrombosis signs/symptoms: No  Thromboembolic event: No  Missed doses: Yes  warfarin was held for procedure. Resumed   Extra doses: No  Medication changes: Yes  patient will be put on amoxicillin for dental work.  Dietary changes: No  Change in health: No  Change in activity: No  Alcohol: No  Other concerns: No    Upcoming Procedures:  Does the Patient Have any upcoming procedures that require interruption in anticoagulation therapy? yes  Does the patient require bridging? no      Anticoagulation Summary  As of 2024      INR goal:  2.0-3.0   TTR:  51.2% (1.2 y)   INR used for dosin.50 (2024)   Weekly warfarin total:  33.75 mg               Assessment/Plan   Subtherapeutic.  Paged Dr. Vela regarding result and upcoming dental procedure. Dr. Vela advised increased dose over weekend and RTC in 3 days to reevaluate.    1. New dose:  will have patient take 7.5 mg today and tomorrow.      2. Next INR:  3 days.       Education provided to patient during the visit:  Patient instructed to call in interim with questions, concerns and changes.   Patient educated on interactions between medications and warfarin.   Patient educated on dietary consistency in vitamin k consumption.   Patient educated on affects of alcohol consumption while taking warfarin.   Patient educated on signs of bleeding/clotting.

## 2024-12-16 ENCOUNTER — ANTICOAGULATION - WARFARIN VISIT (OUTPATIENT)
Dept: CARDIOLOGY | Facility: CLINIC | Age: 74
End: 2024-12-16
Payer: MEDICARE

## 2024-12-16 DIAGNOSIS — I26.99 PULMONARY EMBOLISM, UNSPECIFIED CHRONICITY, UNSPECIFIED PULMONARY EMBOLISM TYPE, UNSPECIFIED WHETHER ACUTE COR PULMONALE PRESENT (MULTI): ICD-10-CM

## 2024-12-16 LAB
POC INR: 2.1
POC PROTHROMBIN TIME: NORMAL

## 2024-12-16 PROCEDURE — 85610 PROTHROMBIN TIME: CPT | Mod: QW

## 2024-12-16 PROCEDURE — 99211 OFF/OP EST MAY X REQ PHY/QHP: CPT

## 2024-12-16 NOTE — PROGRESS NOTES
Patient identification verified with 2 identifiers.    Location: [facility] at [facility] - [address] [address] [phone] option #1     Referring Physician: Dr. Marla Vela  Enrollment/ Re-enrollment date: 25   INR Goal: 2.0-3.0  INR monitoring is per AMS protocol.  Anticoagulation Medication: warfarin  Indication: Pulmonary Embolism (PE)    Subjective   Bleeding signs/symptoms: No    Bruising: No   Major bleeding event: No  Thrombosis signs/symptoms: No  Thromboembolic event: No  Missed doses: No  Extra doses: No  Medication changes: No  Dietary changes: No  Change in health: No  Change in activity: No  Alcohol: No  Other concerns: No    Upcoming Procedures:  Does the Patient Have any upcoming procedures that require interruption in anticoagulation therapy? yes  Does the patient require bridging? no      Anticoagulation Summary  As of 2024      INR goal:  2.0-3.0   TTR:  51.0% (1.2 y)   INR used for dosin.10 (2024)   Weekly warfarin total:  33.75 mg               Assessment/Plan   Therapeutic      1. New dose: no change    2. Next INR: 1 week      Education provided to patient during the visit:  Patient instructed to call in interim with questions, concerns and changes.   Patient educated on interactions between medications and warfarin.   Patient educated on dietary consistency in vitamin k consumption.   Patient educated on affects of alcohol consumption while taking warfarin.   Patient educated on signs of bleeding/clotting.

## 2024-12-23 ENCOUNTER — TELEPHONE (OUTPATIENT)
Dept: CARDIOLOGY | Facility: CLINIC | Age: 74
End: 2024-12-23
Payer: MEDICARE

## 2024-12-23 ENCOUNTER — APPOINTMENT (OUTPATIENT)
Dept: CARDIOLOGY | Facility: CLINIC | Age: 74
End: 2024-12-23
Payer: MEDICARE

## 2024-12-27 ENCOUNTER — APPOINTMENT (OUTPATIENT)
Dept: CARDIOLOGY | Facility: CLINIC | Age: 74
End: 2024-12-27
Payer: MEDICARE

## 2024-12-27 ENCOUNTER — ANTICOAGULATION - WARFARIN VISIT (OUTPATIENT)
Dept: CARDIOLOGY | Facility: CLINIC | Age: 74
End: 2024-12-27
Payer: MEDICARE

## 2024-12-27 DIAGNOSIS — I26.99 PULMONARY EMBOLISM, UNSPECIFIED CHRONICITY, UNSPECIFIED PULMONARY EMBOLISM TYPE, UNSPECIFIED WHETHER ACUTE COR PULMONALE PRESENT (MULTI): Primary | ICD-10-CM

## 2024-12-27 LAB
POC INR: 4.3
POC PROTHROMBIN TIME: NORMAL

## 2024-12-27 PROCEDURE — 85610 PROTHROMBIN TIME: CPT | Mod: QW

## 2024-12-27 PROCEDURE — 99211 OFF/OP EST MAY X REQ PHY/QHP: CPT

## 2024-12-27 NOTE — PROGRESS NOTES
Patient identification verified with 2 identifiers.    Location: [facility] at [facility] - [address] [address] [phone] option #1     Referring Physician: Dr. Marla Vela  Enrollment/ Re-enrollment date: 25   INR Goal: 2.0-3.0  INR monitoring is per AMS protocol.  Anticoagulation Medication: warfarin  Indication: Pulmonary Embolism (PE)    Subjective   Bleeding signs/symptoms: No    Bruising: No   Major bleeding event: No  Thrombosis signs/symptoms: No  Thromboembolic event: No  Missed doses: No  Extra doses: No  Medication changes: No  Dietary changes: No  Change in health: No  Change in activity: No  Alcohol: No  Other concerns: No    Upcoming Procedures:  Does the Patient Have any upcoming procedures that require interruption in anticoagulation therapy? no  Does the patient require bridging? no      Anticoagulation Summary  As of 2024      INR goal:  2.0-3.0   TTR:  50.7% (1.2 y)   INR used for dosin.30 (2024)   Weekly warfarin total:  30 mg               Assessment/Plan   Supratherapeutic     1. New dose: Will  hold 2 days, decrease weekly dose and patient will return in 1 week.    2. Next INR: 1 week      Education provided to patient during the visit:  Patient instructed to call in interim with questions, concerns and changes.

## 2025-01-03 ENCOUNTER — ANTICOAGULATION - WARFARIN VISIT (OUTPATIENT)
Dept: CARDIOLOGY | Facility: CLINIC | Age: 75
End: 2025-01-03
Payer: MEDICARE

## 2025-01-03 DIAGNOSIS — I26.99 PULMONARY EMBOLISM, UNSPECIFIED CHRONICITY, UNSPECIFIED PULMONARY EMBOLISM TYPE, UNSPECIFIED WHETHER ACUTE COR PULMONALE PRESENT (MULTI): Primary | ICD-10-CM

## 2025-01-03 LAB
POC INR: 1.7
POC PROTHROMBIN TIME: NORMAL

## 2025-01-03 PROCEDURE — 99211 OFF/OP EST MAY X REQ PHY/QHP: CPT

## 2025-01-03 PROCEDURE — 85610 PROTHROMBIN TIME: CPT | Mod: QW

## 2025-01-03 NOTE — PROGRESS NOTES
Patient identification verified with 2 identifiers.    Location: [facility] at [facility] - [address] [address] [phone] option #1     Referring Physician: Dr. Marla Vela  Enrollment/ Re-enrollment date: 25   INR Goal: 2.0-3.0  INR monitoring is per AMS protocol.  Anticoagulation Medication: warfarin  Indication: Pulmonary Embolism (PE)    Subjective   Bleeding signs/symptoms: No    Bruising: No   Major bleeding event: No  Thrombosis signs/symptoms: No  Thromboembolic event: No  Missed doses: No  Extra doses: No  Medication changes: No  Dietary changes: No  Change in health: No  Change in activity: No  Alcohol: No  Other concerns: No    Upcoming Procedures:  Does the Patient Have any upcoming procedures that require interruption in anticoagulation therapy? no  Does the patient require bridging? no      Anticoagulation Summary  As of 1/3/2025      INR goal:  2.0-3.0   TTR:  50.5% (1.2 y)   INR used for dosin.70 (1/3/2025)   Weekly warfarin total:  33.75 mg               Assessment/Plan   SUB THERAPEUTIC  INCREASE TWD  RTC IN 1 WEEK.    Education provided to patient during the visit:  Patient instructed to call in interim with questions, concerns and changes.

## 2025-01-06 ENCOUNTER — OFFICE VISIT (OUTPATIENT)
Dept: UROLOGY | Facility: HOSPITAL | Age: 75
End: 2025-01-06
Payer: MEDICARE

## 2025-01-06 DIAGNOSIS — N52.9 ERECTILE DYSFUNCTION, UNSPECIFIED ERECTILE DYSFUNCTION TYPE: Primary | ICD-10-CM

## 2025-01-06 DIAGNOSIS — R97.20 ELEVATED PSA: ICD-10-CM

## 2025-01-06 PROCEDURE — 99215 OFFICE O/P EST HI 40 MIN: CPT | Performed by: UROLOGY

## 2025-01-06 NOTE — PROGRESS NOTES
"Last visit 11/7/22  #Prostate Cancer:    -Will refer to Dr. Beltran/Dr. Gonzalez for further management      #ED  -cialis 20mg prn - med counseling done     pt will call to fu for ED    Today's visit:  #prostate ca  Dr Beltran's notes reviewed  -[facility] notes reviewed  -underwent radiation, on trial  PSA <0.1 8/14/24  PSA <0.1 5/14/24    #ED  -tried viagra in past, hasn't worked in a long time  -no ntg  -underwent radiation for prostate CA  -right hernia repair 8 years ago   -failed Cialis and viagra    Rad Onc notes reviewed    Labs  Lab Results   Component Value Date    TESTOSTERONE 388 08/14/2024    TESTOSTERONE 346 05/14/2024    TESTOSTERONE 296 11/13/2023     No results found for: \"LH\"  No results found for: \"FSH\"  No components found for: \"ESTRADIAL\"  Lab Results   Component Value Date    PSA <0.1 08/14/2024     No components found for: \"CBC\"  No results found for: \"PROLACTIN\"  Lab Results   Component Value Date    HGBA1C 5.9 (H) 06/12/2024     No components found for: \"HEMATOCRIT\"      Medications:    Current Outpatient Medications:     amLODIPine (Norvasc) 5 mg tablet, Take 1 tablet (5 mg) by mouth once daily., Disp: , Rfl:     calcium carbonate (Oscal) 500 mg calcium (1,250 mg) tablet, Take 1 tablet (1,250 mg) by mouth once daily., Disp: , Rfl:     cholecalciferol (Vitamin D-3) 10 mcg (400 unit) capsule, Take 1 capsule (10 mcg) by mouth once daily., Disp: , Rfl:     cyanocobalamin (Vitamin B-12) 1,000 mcg tablet, Take 1 tablet (1,000 mcg) by mouth once daily., Disp: , Rfl:     esomeprazole (NexIUM) 40 mg DR capsule, Take 1 capsule (40 mg) by mouth once daily in the morning. Take before meals. Taking PRN, Disp: , Rfl:     multivitamin tablet, Take 1 tablet by mouth once daily., Disp: , Rfl:     warfarin (Coumadin) 7.5 mg tablet, Take as directed per After Visit Summary. (Patient taking differently: Take 1 tablet (7.5 mg) by mouth once daily.), Disp: 90 tablet, Rfl: 2    Allergy:  Allergies   Allergen Reactions    " Cefazolin Sodium Unknown    Cephalexin Unknown    Cephalosporins Unknown     states was mild and it was 20 years ago        Exam  Testicles descended bilaterally, nontender, no masses  Vasa palpable bilaterally  Penis circ'd, no lesions, no plaques         Assessment/Plan  ED failed pills,  s/p radiation for prostate CA    -#Erectile Dysfunction: I discussed the etiology and different treatment modalities for erectile dysfunction. Specifically, we talked about lifestyle factors like smoking, diet, and exercise. We also discussed ED as a sign of systemic disease, and the contributions of elevated cholesterol and elevated blood sugars on erections. We then discussed treatment modalities, specifically PDE5 inhibitors, intracavernosal injections, vacuum erectile devices, and penile prostheses.    Will schedule intracavernosal injection and penile duplex ultrasound. Discussed that this involves an injection in the penis and subsequent ultrasound of penis to measure vasculature. This may result in a prolonged erection, which may require injection of reversal agent prior to discharge. Risks of priapism, pain, scar tissue, bruising, discussed. All questions answered.    We had a long discussion regarding penile prosthesis, including risks, benefits, and alternatives. We discussed risks including but not limited to pain, bleeding, infection, damage to adjacent structures, need for further procedures, malfunction, erosion, extrusion, complications of anesthesia etc. We discussed the postoperative course and expectations, and specifically that after getting an implant, other methods such as injections etc are no longer effective. We also discussed the effect of the implant on length and that it will likely be shorter than previous given age and duration of ED. Further, if he gets an infection and the implant has to be removed, there is potential for him to never have erections again. All questions were answered and reading  materials were given. The patient was given models of both the inflatable and malleable implants, and his ability to squeeze the pump was also assessed      Fu for Doppler    G 2211  Visit complexity inherent to evaluation and management (E&M) associated with medical care services that serve as the continuing focal point for all needed health care services and/or with medical care services that are part of ongoing care related to a patient's single, serious condition or a complex condition.    Scribe Attestation  By signing my name below, IAisha Scribe   attest that this documentation has been prepared under the direction and in the presence of Mary Beth Pemberton MD.

## 2025-01-10 ENCOUNTER — ANTICOAGULATION - WARFARIN VISIT (OUTPATIENT)
Dept: CARDIOLOGY | Facility: CLINIC | Age: 75
End: 2025-01-10
Payer: MEDICARE

## 2025-01-10 DIAGNOSIS — I26.99 PULMONARY EMBOLISM, UNSPECIFIED CHRONICITY, UNSPECIFIED PULMONARY EMBOLISM TYPE, UNSPECIFIED WHETHER ACUTE COR PULMONALE PRESENT (MULTI): Primary | ICD-10-CM

## 2025-01-10 LAB
POC INR: 2.1
POC PROTHROMBIN TIME: NORMAL

## 2025-01-10 PROCEDURE — 85610 PROTHROMBIN TIME: CPT | Mod: QW

## 2025-01-10 PROCEDURE — 99211 OFF/OP EST MAY X REQ PHY/QHP: CPT

## 2025-01-10 NOTE — PROGRESS NOTES
Patient identification verified with 2 identifiers.    Location: [facility] at [facility] - [address] [address] [phone] option #1     Referring Physician: Dr. Marla Vela  Enrollment/ Re-enrollment date: 25   INR Goal: 2.0-3.0  INR monitoring is per AMS protocol.  Anticoagulation Medication: warfarin  Indication: Pulmonary Embolism (PE)    Subjective   Bleeding signs/symptoms: No    Bruising: No   Major bleeding event: No  Thrombosis signs/symptoms: No  Thromboembolic event: No  Missed doses: No  Extra doses: No  Medication changes: No  Dietary changes: No  Change in health: No  Change in activity: No  Alcohol: No  Other concerns: No    Upcoming Procedures:  Does the Patient Have any upcoming procedures that require interruption in anticoagulation therapy? no  Does the patient require bridging? no      Anticoagulation Summary  As of 1/10/2025      INR goal:  2.0-3.0   TTR:  50.1% (1.2 y)   INR used for dosin.10 (1/10/2025)   Weekly warfarin total:  33.75 mg               Assessment/Plan   THERAPEUTIC  MAINTAIN TWD  RTC IN 1 WEEK  Education provided to patient during the visit:  Patient instructed to call in interim with questions, concerns and changes.

## 2025-01-13 ENCOUNTER — APPOINTMENT (OUTPATIENT)
Dept: UROLOGY | Facility: HOSPITAL | Age: 75
End: 2025-01-13
Payer: MEDICARE

## 2025-01-16 DIAGNOSIS — M54.16 LUMBAR RADICULOPATHY: ICD-10-CM

## 2025-01-17 ENCOUNTER — APPOINTMENT (OUTPATIENT)
Dept: CARDIOLOGY | Facility: CLINIC | Age: 75
End: 2025-01-17
Payer: MEDICARE

## 2025-01-17 ENCOUNTER — ANTICOAGULATION - WARFARIN VISIT (OUTPATIENT)
Dept: CARDIOLOGY | Facility: CLINIC | Age: 75
End: 2025-01-17
Payer: MEDICARE

## 2025-01-17 DIAGNOSIS — I26.99 PULMONARY EMBOLISM, UNSPECIFIED CHRONICITY, UNSPECIFIED PULMONARY EMBOLISM TYPE, UNSPECIFIED WHETHER ACUTE COR PULMONALE PRESENT (MULTI): Primary | ICD-10-CM

## 2025-01-17 LAB
POC INR: 2.8
POC PROTHROMBIN TIME: NORMAL

## 2025-01-17 PROCEDURE — 85610 PROTHROMBIN TIME: CPT | Mod: QW

## 2025-01-17 PROCEDURE — 99211 OFF/OP EST MAY X REQ PHY/QHP: CPT

## 2025-01-17 NOTE — PROGRESS NOTES
Patient identification verified with 2 identifiers.    Location: [facility] at [facility] - [address] [address] [phone] option #1     Referring Physician: DR. LAURENCE PHAM  Enrollment/ Re-enrollment date: 2025   INR Goal: 2.0-3.0  INR monitoring is per AMS protocol.  Anticoagulation Medication: warfarin  Indication: Pulmonary Embolism (PE)    Subjective   Bleeding signs/symptoms: No    Bruising: No   Major bleeding event: No  Thrombosis signs/symptoms: No  Thromboembolic event: No  Missed doses: No  Extra doses: No  Medication changes: No  Dietary changes: No  Change in health: No  Change in activity: No  Alcohol: No  Other concerns: No    Upcoming Procedures:  Does the Patient Have any upcoming procedures that require interruption in anticoagulation therapy? no  Does the patient require bridging? no      Anticoagulation Summary  As of 2025      INR goal:  2.0-3.0   TTR:  50.8% (1.3 y)   INR used for dosin.80 (2025)   Weekly warfarin total:  33.75 mg               Assessment/Plan   Therapeutic     1. New dose: no change    2. Next INR: 2 weeks      Education provided to patient during the visit:  Patient instructed to call in interim with questions, concerns and changes.   Patient educated on compliance with dosing, follow up appointments, and prescribed plan of care.

## 2025-01-23 ENCOUNTER — HOSPITAL ENCOUNTER (OUTPATIENT)
Dept: RADIOLOGY | Facility: HOSPITAL | Age: 75
Discharge: HOME | End: 2025-01-23
Payer: MEDICARE

## 2025-01-23 DIAGNOSIS — M54.16 LUMBAR RADICULOPATHY: ICD-10-CM

## 2025-01-23 PROCEDURE — 72148 MRI LUMBAR SPINE W/O DYE: CPT

## 2025-01-29 ENCOUNTER — APPOINTMENT (OUTPATIENT)
Dept: UROLOGY | Facility: CLINIC | Age: 75
End: 2025-01-29
Payer: MEDICARE

## 2025-01-30 DIAGNOSIS — M48.061 SPINAL STENOSIS OF LUMBAR REGION WITHOUT NEUROGENIC CLAUDICATION: ICD-10-CM

## 2025-01-31 ENCOUNTER — ANTICOAGULATION - WARFARIN VISIT (OUTPATIENT)
Dept: CARDIOLOGY | Facility: CLINIC | Age: 75
End: 2025-01-31
Payer: MEDICARE

## 2025-01-31 DIAGNOSIS — I26.99 PULMONARY EMBOLISM, UNSPECIFIED CHRONICITY, UNSPECIFIED PULMONARY EMBOLISM TYPE, UNSPECIFIED WHETHER ACUTE COR PULMONALE PRESENT (MULTI): Primary | ICD-10-CM

## 2025-01-31 LAB
POC INR: 3.8
POC PROTHROMBIN TIME: NORMAL

## 2025-01-31 PROCEDURE — 99211 OFF/OP EST MAY X REQ PHY/QHP: CPT

## 2025-01-31 PROCEDURE — 85610 PROTHROMBIN TIME: CPT | Mod: QW

## 2025-01-31 NOTE — PROGRESS NOTES
Patient identification verified with 2 identifiers.    Location: [facility] at [facility] - [address] [address] [phone] option #1     Referring Physician: DR. LAURENCE PHAM  Enrollment/ Re-enrollment date: 2/22/2025   INR Goal: 2.0-3.0  INR monitoring is per AMS protocol.  Anticoagulation Medication: warfarin  Indication: Pulmonary Embolism (PE)    Subjective   Bleeding signs/symptoms: No    Bruising: No   Major bleeding event: No  Thrombosis signs/symptoms: No  Thromboembolic event: No  Missed doses: No  Extra doses: No  Medication changes: No  Dietary changes: No  Change in health: No  Change in activity: No  Alcohol: Yes  INCREASED  Other concerns: No    Upcoming Procedures:  Does the Patient Have any upcoming procedures that require interruption in anticoagulation therapy? no  Does the patient require bridging? no      Anticoagulation Summary  As of 1/31/2025      INR goal:  2.0-3.0   TTR:  49.9% (1.3 y)   INR used for dosing:  3.80 (1/31/2025)   Weekly warfarin total:  33.75 mg               Assessment/Plan   SUPRA THERAPEUTIC IN THE SETTING OF ALCOHOL CONSUMPTION.  WILL HOLD TODAY'S DOSE THEN MAINTAIN TWD  RTC IN 1 WEEK.    Education provided to patient during the visit:  Patient instructed to call in interim with questions, concerns and changes.   Patient educated on compliance with dosing, follow up appointments, and prescribed plan of care.

## 2025-02-07 ENCOUNTER — ANTICOAGULATION - WARFARIN VISIT (OUTPATIENT)
Dept: CARDIOLOGY | Facility: CLINIC | Age: 75
End: 2025-02-07
Payer: MEDICARE

## 2025-02-07 DIAGNOSIS — I26.99 PULMONARY EMBOLISM, UNSPECIFIED CHRONICITY, UNSPECIFIED PULMONARY EMBOLISM TYPE, UNSPECIFIED WHETHER ACUTE COR PULMONALE PRESENT (MULTI): Primary | ICD-10-CM

## 2025-02-07 LAB
POC INR: 2.5
POC PROTHROMBIN TIME: NORMAL

## 2025-02-07 PROCEDURE — 85610 PROTHROMBIN TIME: CPT | Mod: QW

## 2025-02-07 PROCEDURE — 99211 OFF/OP EST MAY X REQ PHY/QHP: CPT

## 2025-02-07 NOTE — PROGRESS NOTES
Patient identification verified with 2 identifiers.    Location: [facility] at [facility] - [address] [address] [phone] option #1     Referring Physician: DR. LAURENCE PHAM  Enrollment/ Re-enrollment date: 2025   INR Goal: 2.0-3.0  INR monitoring is per AMS protocol.  Anticoagulation Medication: warfarin  Indication: Pulmonary Embolism (PE)    Subjective   Bleeding signs/symptoms: No    Bruising: No   Major bleeding event: No  Thrombosis signs/symptoms: No  Thromboembolic event: No  Missed doses: No  Extra doses: No  Medication changes: No  Dietary changes: No  Change in health: No  Change in activity: No  Alcohol: No  Other concerns: No    Upcoming Procedures:  Does the Patient Have any upcoming procedures that require interruption in anticoagulation therapy?yes  Does the patient require bridging? no      Anticoagulation Summary  As of 2025      INR goal:  2.0-3.0   TTR:  49.7% (1.3 y)   INR used for dosin.50 (2025)   Weekly warfarin total:  33.75 mg               Assessment/Plan   THERAPEUTIC  PT WILL MAINTAIN TWD AND RTC ON  PER PT AVAILABILITY AND D/T FACT PT WILL BE HAVING SPINAL INJECTION IN 2 WEEKS ON  AND WARFARIN WILL BE ON HOLD STARTING . PT WAS NOT ADVISED TO TAKE LOVENOX FOR THIS.    Education provided to patient during the visit:  Patient instructed to call in interim with questions, concerns and changes.   Patient educated on compliance with dosing, follow up appointments, and prescribed plan of care.

## 2025-02-10 ENCOUNTER — TELEPHONE (OUTPATIENT)
Dept: RADIATION ONCOLOGY | Facility: HOSPITAL | Age: 75
End: 2025-02-10
Payer: MEDICARE

## 2025-02-10 ASSESSMENT — ENCOUNTER SYMPTOMS
ALLERGIC/IMMUNOLOGIC NEGATIVE: 1
UNEXPECTED WEIGHT CHANGE: 0
JOINT SWELLING: 0
FATIGUE: 0
FEVER: 0
SHORTNESS OF BREATH: 0
DIARRHEA: 0
PSYCHIATRIC NEGATIVE: 1
DIFFICULTY URINATING: 0
BLOOD IN STOOL: 0
HEMATURIA: 0
BACK PAIN: 0
DYSURIA: 0
CHEST TIGHTNESS: 0
WEAKNESS: 0
ANAL BLEEDING: 0
ARTHRALGIAS: 0
DIZZINESS: 0
FREQUENCY: 0
RECTAL PAIN: 0
PALPITATIONS: 0
COUGH: 0
ABDOMINAL PAIN: 0
CONSTIPATION: 0

## 2025-02-10 NOTE — TELEPHONE ENCOUNTER
Called pt. to remind of appointment on 2/11/2025 at  2:30 pm with ADAM Navarrete. Pt's phone went to voicemail left number if needs to reschedule.

## 2025-02-10 NOTE — PROGRESS NOTES
Cancer synopsis:  Rad/onc: Karissa Rivera/ CLIFTON Navarrete  Med/onc: Dr. Marques/ CNP Soler     Prostate Cancer - Local  Treatment  -Initial PSA 7.87, prostate biopsy consistent with [place] 4+5 = 9, grade group 5, MRI February 2023 no evidence of extracapsular extension or lymph node involvement, PET PSMA negative for distant metastases  -Potential discussion of KRYSTAL 1821, use of SBRT with abiraterone, ADT, PARP inhibition    - 3/2/23 C1D1 on above trial    - Completed RT to prostate SV and LN on 5/15/23     History of presenting illness:    Patient ID: [id number]     Louis Rainey is a 74 y.o. male who presents to me for his prostate cancer GS 4+5=9, IPSA 7.87 now s/p RT. Enrolled in [id number]    RT Site: prostate and SV  RT Date: 05/15/2023  Hormone therapy: Yes, AA/p, ADT, and naraparib. ([id number] protocol), done 09/2023  Hot Flushes: Denies.  Fatigue: Denies  Bone pain: Admits to lower back pain that has been now treated with surgery however remains. Was ordered PT and pain management by nrsgy has PT scheduled.  ED: Admits to loss of sexual since use of systemic therapy. Drive is still low per patient.  - Quality of erections during the last 4 weeks: 1 = None at all  - Use of erectile dysfunction medications:  None  IPSS: 4  Urinary symptoms: Denies dysuria, hematuria or urine leakage. Denies incomplete bladder or frequency. Does report some urgency at times most notably with ETOH consumption and increased fluid intake. Denies dysuria, hematuria, or urine leakage. Nocturia 2-6 times a night. Does report some urgency that has been ongoing for several months now. Delighted with QOL in regards to urination.   Urinary Medications: No  Rectal bleeding: Denies  Colonoscopy: 03/2015 multiple polyps removed next due now (five years late at this time)  Other systems: Denies SOB, CP or fever.    Review of systems:  Review of Systems   Constitutional:  Negative for fatigue, fever and unexpected weight change.    Respiratory:  Negative for cough, chest tightness and shortness of breath.    Cardiovascular:  Negative for chest pain, palpitations and leg swelling.   Gastrointestinal:  Negative for abdominal pain, anal bleeding, blood in stool, constipation, diarrhea and rectal pain.   Endocrine: Negative for cold intolerance, heat intolerance and polyuria.   Genitourinary:  Negative for decreased urine volume, difficulty urinating, dysuria, frequency, hematuria and urgency.        Refer to HPI   Musculoskeletal:  Negative for arthralgias, back pain, gait problem and joint swelling.   Skin: Negative.    Allergic/Immunologic: Negative.    Neurological:  Negative for dizziness, syncope and weakness.   Psychiatric/Behavioral: Negative.       PERFORMANCE STATUS:  KPS/ECO, Normal activity with effort; some signs or symptoms of disease (ECOG equivalent 1)    Past Medical history  Past Medical History:   Diagnosis Date    Anemia     Arrhythmia     SVT/ PVCs-([facility] Feb-2022), AFIB    Arthritis     Forrest's esophagus     CKD (chronic kidney disease)     stage III    Erectile dysfunction     GERD (gastroesophageal reflux disease)     HTN (hypertension)     Low back pain     Lumbar compression fracture (Multi)     Lumbar spondylosis     PE (pulmonary thromboembolism) (Multi)     on Coumadin    Prostate cancer (Multi)     s/p RT follows with Paul Navarrete, CNP    Spinal stenosis     Syncope       Surgical/family history  Family History   Problem Relation Name Age of Onset    Cancer Mother      Heart attack Father        Past Surgical History:   Procedure Laterality Date    COLONOSCOPY      ESOPHAGOGASTRODUODENOSCOPY      OTHER SURGICAL HISTORY  2019    Knee surgery    OTHER SURGICAL HISTORY  2019    Shoulder surgery    OTHER SURGICAL HISTORY  2019    Gallbladder surgery      Social History  Tobacco Use: Medium Risk (2025)    Patient History     Smoking Tobacco Use: Former     Smokeless Tobacco Use:  Never     Passive Exposure: Past       Current med list:  Current Outpatient Medications   Medication Instructions    amLODIPine (NORVASC) 5 mg, Daily    calcium carbonate (Oscal) 500 mg calcium (1,250 mg) tablet 1 tablet, Daily    cholecalciferol (Vitamin D-3) 10 mcg (400 unit) capsule 1 capsule, Daily    cyanocobalamin (VITAMIN B-12) 1,000 mcg, Daily    esomeprazole (NEXIUM) 40 mg, Daily before breakfast    multivitamin tablet 1 tablet, Daily    warfarin (Coumadin) 7.5 mg tablet Take as directed per After Visit Summary.      Last recorded vital:  There were no vitals taken for this visit.    Physical Exam  Constitutional:       Appearance: Normal appearance.   Cardiovascular:      Rate and Rhythm: Normal rate.   Pulmonary:      Effort: Pulmonary effort is normal.      Breath sounds: Normal breath sounds.   Musculoskeletal:         General: Normal range of motion.      Cervical back: Normal range of motion.   Neurological:      Mental Status: He is alert and oriented to person, place, and time.   Psychiatric:         Mood and Affect: Mood normal.         Behavior: Behavior normal.         Thought Content: Thought content normal.         Judgment: Judgment normal.         Pertinent labs:  Prostate Specific AG   Date/Time Value Ref Range Status   05/14/2024 02:39 PM <0.10 <=4.00 ng/mL Final     PSA   Date/Time Value Ref Range Status   08/14/2024 01:57 PM <0.1 0.0 - 4.0 ng/mL Final     Comment:     INTERPRETIVE INFORMATION: Prostate Specific Antigen    The Roche PSA electrochemiluminescent immunoassay is used. Results   obtained with different test methods or kits cannot be used   interchangeably. The Roche PSA method is approved for use as an   aid in the detection of prostate cancer when used in conjunction   with a digital rectal exam in individuals with a prostate age 50   years and older. The Roche PSA is also indicated for the serial   measurement of PSA to aid in the prognosis and management of   prostate  cancer patients. Elevated PSA concentrations can only   suggest the presence of prostate cancer until biopsy is performed.   PSA concentrations can also be elevated in benign prostatic   hyperplasia or inflammatory conditions of the prostate. PSA is   generally not elevated in healthy individuals or individuals with   nonprostatic carcinoma.     Dx:  Problem List Items Addressed This Visit       Adenocarcinoma of prostate (Multi) - Primary    Relevant Orders    Prostate Specific Antigen    Testosterone    Comprehensive Metabolic Panel    CBC and Auto Differential   Discussed need for labs today per [id number]. Review of latent SE including rectal bleeding, hematuria, urinary strictures, ED where reviewed as well as how to contact office if s/s present. Denies latent SE. Current ED related to low testerone from systemic therapy and will re-asses with rise into normal of testerone. NCCN guidelines where reviewed and routine FUV of every 3m for first year and every 6m for four years for a total of five years was discussed. Patient verbalized understanding.      Recommend vitamin D supplementation, start (or increase by) 400-1000 units daily. Reviewed importance of intake while on Testerone lowering therapy as well as after until Testerone re-establishes, at which point may stop if DEXA indicative of normal bone density. Also reviewed that individuals at a higher risk such as those over the age of 75 or those with a hx of bone fx, osteoporosis or osteopenia to continue supplementation indefinitely with routine DEXA imaging as per national guidelines to assess bone health continually.    Reviewed guidelines for colonoscopy. Consider having done now    Discussed [id number] protocol and prostate bx. Discussed that this is not SOC and given this insurance coverage is not provided and currently his lab work would not indicate bx. Patient declined at this time    PLAN:  FUV 6m  Labs per KRYSTAL 1821 (CBC,  testerone/PSA,cmp)  Screening: Have colonoscopy done  FUV other providers: PCP for routine evals, Nrsgy for L4 compression fx surgery follow up and management, PT and pain management for current lower back pain.    Please contact office with any concerns:  Paul Cannon CNP  [phone]

## 2025-02-11 ENCOUNTER — LAB (OUTPATIENT)
Dept: LAB | Facility: HOSPITAL | Age: 75
End: 2025-02-11
Payer: MEDICARE

## 2025-02-11 ENCOUNTER — DOCUMENTATION (OUTPATIENT)
Dept: RADIATION ONCOLOGY | Facility: HOSPITAL | Age: 75
End: 2025-02-11

## 2025-02-11 ENCOUNTER — HOSPITAL ENCOUNTER (OUTPATIENT)
Dept: RADIATION ONCOLOGY | Facility: HOSPITAL | Age: 75
Setting detail: RADIATION/ONCOLOGY SERIES
Discharge: HOME | End: 2025-02-11
Payer: MEDICARE

## 2025-02-11 VITALS
WEIGHT: 235.01 LBS | OXYGEN SATURATION: 97 % | RESPIRATION RATE: 18 BRPM | TEMPERATURE: 96.8 F | SYSTOLIC BLOOD PRESSURE: 117 MMHG | BODY MASS INDEX: 31.87 KG/M2 | HEART RATE: 79 BPM | DIASTOLIC BLOOD PRESSURE: 69 MMHG

## 2025-02-11 DIAGNOSIS — C61 ADENOCARCINOMA OF PROSTATE (MULTI): ICD-10-CM

## 2025-02-11 DIAGNOSIS — C61 ADENOCARCINOMA OF PROSTATE (MULTI): Primary | ICD-10-CM

## 2025-02-11 LAB
ALBUMIN SERPL BCP-MCNC: 3.9 G/DL (ref 3.4–5)
ALP SERPL-CCNC: 60 U/L (ref 33–136)
ALT SERPL W P-5'-P-CCNC: 28 U/L (ref 10–52)
ANION GAP SERPL CALC-SCNC: 11 MMOL/L (ref 10–20)
AST SERPL W P-5'-P-CCNC: 28 U/L (ref 9–39)
BASOPHILS # BLD AUTO: 0.02 X10*3/UL (ref 0–0.1)
BASOPHILS NFR BLD AUTO: 0.5 %
BILIRUB SERPL-MCNC: 0.4 MG/DL (ref 0–1.2)
BUN SERPL-MCNC: 15 MG/DL (ref 6–23)
CALCIUM SERPL-MCNC: 9 MG/DL (ref 8.6–10.3)
CHLORIDE SERPL-SCNC: 106 MMOL/L (ref 98–107)
CO2 SERPL-SCNC: 26 MMOL/L (ref 21–32)
CREAT SERPL-MCNC: 1.58 MG/DL (ref 0.5–1.3)
EGFRCR SERPLBLD CKD-EPI 2021: 45 ML/MIN/1.73M*2
EOSINOPHIL # BLD AUTO: 0.16 X10*3/UL (ref 0–0.4)
EOSINOPHIL NFR BLD AUTO: 3.7 %
ERYTHROCYTE [DISTWIDTH] IN BLOOD BY AUTOMATED COUNT: 15.9 % (ref 11.5–14.5)
GLUCOSE SERPL-MCNC: 102 MG/DL (ref 74–99)
HCT VFR BLD AUTO: 40.2 % (ref 41–52)
HGB BLD-MCNC: 13 G/DL (ref 13.5–17.5)
IMM GRANULOCYTES # BLD AUTO: 0.01 X10*3/UL (ref 0–0.5)
IMM GRANULOCYTES NFR BLD AUTO: 0.2 % (ref 0–0.9)
LYMPHOCYTES # BLD AUTO: 1.64 X10*3/UL (ref 0.8–3)
LYMPHOCYTES NFR BLD AUTO: 37.4 %
MCH RBC QN AUTO: 29.7 PG (ref 26–34)
MCHC RBC AUTO-ENTMCNC: 32.3 G/DL (ref 32–36)
MCV RBC AUTO: 92 FL (ref 80–100)
MONOCYTES # BLD AUTO: 0.32 X10*3/UL (ref 0.05–0.8)
MONOCYTES NFR BLD AUTO: 7.3 %
NEUTROPHILS # BLD AUTO: 2.23 X10*3/UL (ref 1.6–5.5)
NEUTROPHILS NFR BLD AUTO: 50.9 %
NRBC BLD-RTO: 0 /100 WBCS (ref 0–0)
PLATELET # BLD AUTO: 201 X10*3/UL (ref 150–450)
POTASSIUM SERPL-SCNC: 4.1 MMOL/L (ref 3.5–5.3)
PROT SERPL-MCNC: 7 G/DL (ref 6.4–8.2)
PSA SERPL-MCNC: <0.1 NG/ML
RBC # BLD AUTO: 4.37 X10*6/UL (ref 4.5–5.9)
SODIUM SERPL-SCNC: 139 MMOL/L (ref 136–145)
TESTOST SERPL-MCNC: 315 NG/DL (ref 240–1000)
WBC # BLD AUTO: 4.4 X10*3/UL (ref 4.4–11.3)

## 2025-02-11 PROCEDURE — 84403 ASSAY OF TOTAL TESTOSTERONE: CPT

## 2025-02-11 PROCEDURE — 80053 COMPREHEN METABOLIC PANEL: CPT

## 2025-02-11 PROCEDURE — 36415 COLL VENOUS BLD VENIPUNCTURE: CPT

## 2025-02-11 PROCEDURE — 85025 COMPLETE CBC W/AUTO DIFF WBC: CPT

## 2025-02-11 PROCEDURE — 99214 OFFICE O/P EST MOD 30 MIN: CPT

## 2025-02-11 PROCEDURE — 84153 ASSAY OF PSA TOTAL: CPT

## 2025-02-11 ASSESSMENT — ENCOUNTER SYMPTOMS
LOSS OF SENSATION IN FEET: 0
OCCASIONAL FEELINGS OF UNSTEADINESS: 0
DEPRESSION: 0

## 2025-02-11 ASSESSMENT — COLUMBIA-SUICIDE SEVERITY RATING SCALE - C-SSRS
1. IN THE PAST MONTH, HAVE YOU WISHED YOU WERE DEAD OR WISHED YOU COULD GO TO SLEEP AND NOT WAKE UP?: NO
6. HAVE YOU EVER DONE ANYTHING, STARTED TO DO ANYTHING, OR PREPARED TO DO ANYTHING TO END YOUR LIFE?: NO
2. HAVE YOU ACTUALLY HAD ANY THOUGHTS OF KILLING YOURSELF?: NO

## 2025-02-11 ASSESSMENT — PAIN SCALES - GENERAL: PAINLEVEL_OUTOF10: 0-NO PAIN

## 2025-02-11 NOTE — RESEARCH NOTES
STUDY: [id number]  VISIT: 18M    Clinical Research Specialist saw patient in clinic today.    Adverse Events and Concomitant Medications assessed.    Next appointment and contact information provided.    All questions answered to patient satisfaction.    Radha Stroud  02/11/2025

## 2025-02-20 NOTE — H&P
Pain Management H&P    History Of Present Illness  Louis Rainey is a 74 y.o. male presents for procedure state below. Endorses no changes in past medical history or medical health since last seen in clinic.     Last Coumadin on Sunday (5 days ago)     Past Medical History  He has a past medical history of Anemia, Arrhythmia, Arthritis, Forrest's esophagus, CKD (chronic kidney disease), Erectile dysfunction, GERD (gastroesophageal reflux disease), HTN (hypertension), Low back pain, Lumbar compression fracture (Multi), Lumbar spondylosis, PE (pulmonary thromboembolism) (Multi) (2013), Prostate cancer (Multi), Spinal stenosis, and Syncope.    Surgical History  He has a past surgical history that includes Other surgical history (04/23/2019); Other surgical history (04/23/2019); Other surgical history (04/23/2019); Colonoscopy; and Esophagogastroduodenoscopy.     Social History  He reports that he quit smoking about 5 years ago. His smoking use included cigarettes. He has been exposed to tobacco smoke. He has never used smokeless tobacco. He reports current alcohol use. He reports that he does not use drugs.    Family History  Family History   Problem Relation Name Age of Onset    Cancer Mother      Heart attack Father          Allergies  Cefazolin sodium, Cephalexin, and Cephalosporins    Review of Symptoms:   Constitutional: Negative for chills, diaphoresis or fever  HENT: Negative for neck swelling  Eyes:.  Negative for eye pain  Respiratory:.  Negative for cough, shortness of breath or wheezing    Cardiovascular:.  Negative for chest pain or palpitations  Gastrointestinal:.  Negative for abdominal pain, nausea and vomiting  Genitourinary:.  Negative for urgency  Musculoskeletal:  Positive for back pain. Positive for joint pain. Denies falls within the past 3 months.  Skin: Negative for wounds or itching   Neurological: Negative for dizziness, seizures, loss of consciousness and weakness  Endo/Heme/Allergies:  Does not bruise/bleed easily  Psychiatric/Behavioral: Negative for depression. The patient does not appear anxious.      Pre-sedation Evaluation  ASA class 2  Mallampati score 2     PHYSICAL EXAM  Vitals signs reviewed  Constitutional:       General: Not in acute distress     Appearance: Normal appearance. Not ill-appearing.  HENT:     Head: Normocephalic and atraumatic  Eyes:     Conjunctiva/sclera: Conjunctivae normal  Cardiovascular:     Rate and Rhythm: Normal rate and regular rhythm  Pulmonary:     Effort: No respiratory distress  Abdominal:     Palpations: Abdomen is soft  Musculoskeletal: BOATENG  Skin:     General: Skin is warm and dry  Neurological:     General: No focal deficit present  Psychiatric:         Mood and Affect: Mood normal         Behavior: Behavior normal     Last Recorded Vitals  There were no vitals taken for this visit.    Relevant Results  Current Outpatient Medications   Medication Instructions    amLODIPine (NORVASC) 5 mg, Daily    calcium carbonate (Oscal) 500 mg calcium (1,250 mg) tablet 1 tablet, Daily    cholecalciferol (Vitamin D-3) 10 mcg (400 unit) capsule 1 capsule, Daily    cyanocobalamin (VITAMIN B-12) 1,000 mcg, Daily    esomeprazole (NEXIUM) 40 mg, Daily before breakfast    multivitamin tablet 1 tablet, Daily    warfarin (Coumadin) 7.5 mg tablet Take as directed per After Visit Summary.         MR lumbar spine wo IV contrast 01/23/2025    Narrative  Interpreted By:  Chelly Silva,  Vipul Stallings  STUDY:  MR LUMBAR SPINE WO IV CONTRAST;  1/23/2025 3:01 pm    INDICATION:  Signs/Symptoms:pain.    ,M54.16 Radiculopathy, lumbar region    COMPARISON:  MR lumbar spine 04/03/2024.    ACCESSION NUMBER(S):  [id number]    ORDERING CLINICIAN:  BIBI ANDERSON    TECHNIQUE:  Sagittal T1, T2, STIR, axial T1 and T2 weighted images of the lumbar  spine were acquired.    FINDINGS:  Alignment: The vertebral alignment is maintained.    Vertebrae/Intervertebral Discs: A compression  deformity of the L4  superior endplate is noted with approximately 40-45% height loss,  similar to prior. There is persistent, but improved edema within the  vertebral body and extending to the posterior elements. No  retropulsion. The remainder of the vertebral body heights are intact.  The remainder of the marrow signal is within normal limits.  Multilevel disc height loss and disc desiccation, most pronounced and  mild in degree at L5-S1.    Conus medullaris: The lower thoracic cord appears unremarkable. The  conus medullaris terminates at L1.    T12-L1:  There is no significant central canal or neural foraminal  stenosis.    L1-2: Disc bulge, ligamentum flavum thickening, and facet  arthropathy. Bilateral lateral recess narrowing and mild bilateral  neural foraminal narrowing, right-greater-than-left, similar to prior.    L2-3: Disc bulge, ligamentum flavum thickening, and facet  arthropathy. Moderate spinal canal stenosis with crowding of the  cauda equina nerve roots. This is improved compared to previous MRI  04/03/2024 mild bilateral neural foraminal.    L3-4: Disc bulge, ligamentum flavum thickening, and facet  arthropathy. Severe spinal canal stenosis with compression of the  cauda equina nerve roots. There is severe bilateral neural foraminal  narrowing.    L4-5: Disc bulge, ligamentum flavum thickening, and facet  arthropathy. There is moderate to spinal canal stenosis and moderate  bilateral neural foraminal narrowing, right-greater-than-left.    L5-S1: Disc bulge with annular fissure and facet arthropathy. No  significant spinal canal stenosis. There is moderate bilateral neural  foraminal narrowing.    There is right paraspinal soft tissue edema overlying L2 on L3. The  prevertebral soft tissues are within normal limits.    Impression  1. Chronic L4 compression fracture with similar approximately 40-45%  height loss compared to 04/03/2024. Persistent, but improved  associated edema within the  vertebral body and extending to the  posterior elements.  2. Multilevel lumbar spondylosis as described above most pronounced  and severe at L3-L4 with severe spinal canal stenosis and  crowding/compression of the cauda equina nerve roots. Improved canal  stenosis at L2-3 compared to previous MRI 04/03/2024.    I personally reviewed the images/study and I agree with the findings  as stated by Chandrakant Boone MD. This study was interpreted at  [place], [place].    MACRO:  None    Signed by: Chelly Silva 1/24/2025 9:22 AM  Dictation workstation:   [id number]      MR lumbar spine wo IV contrast 04/03/2024    Narrative  Interpreted By:  Sky Reeves and Sullivan Shannon  STUDY:  MR LUMBAR SPINE WO IV CONTRAST;  4/3/2024 4:30 pm    INDICATION:  Signs/Symptoms:low back pain.    COMPARISON:  CT lumbar spine 11/23/2023    ACCESSION NUMBER(S):  [id number]    ORDERING CLINICIAN:  ROQUE CHACON    TECHNIQUE:  Sagittal T1, T2, STIR, axial T1 and T2 weighted images of the lumbar  spine were acquired.    FINDINGS:  There are 5 lumbar type vertebrae, with numeration in keeping with  prior exams.    Alignment: Minimal grade 1 anterolisthesis of L5 on S1.    Vertebrae/Intervertebral Discs: New compared to prior CT, a  compression deformity of the L4 superior endplate is noted with  associated marrow edema and approximately 40-45% vertebral body  height loss. The marrow edema extends into the posterior elements of  L4. No evidence of retropulsion. The remainder of the vertebral body  heights are intact.    Conus medullaris and Cauda Equina: The conus medullaris terminates at  L1. There is crowding of cauda equina roots at L2-3 and L3-4 as  result of advanced degenerative changes described below.    T10-11: Sagittal images demonstrate no significant central canal or  neural foraminal stenosis.    T11-12: Sagittal images demonstrate no significant central canal or  neural foraminal  stenosis.    T12-L1: No significant central canal or neural foraminal stenosis.    L1-L2: Mild bilateral lateral recess stenosis and mild left greater  than right neural foraminal stenosis due to mild circumferential disc  bulge and thickening of the ligamentum flavum.    L2-L3: Near-complete circumferential effacement of the thecal sac as  result of broad-based posterior disc bulge, thickening of the  ligamentum flavum, and facet hypertrophy. Thus, there is severe  central canal stenosis and compression of the cauda equina nerve  roots. Severe lateral recess stenosis. Mild left neural foraminal  stenosis.    L3-L4: Complete circumferential effacement of the thecal sac as  result of broad-based posterior disc bulge, thickening of the  ligamentum flavum, and facet hypertrophy. Thus, there is severe  central canal stenosis and compression of the cauda equina nerve  roots. Severe lateral recess and neural foraminal stenosis.    L4-5: Moderate central canal stenosis, severe lateral recess  stenosis, and moderate right greater than left neural foraminal  stenosis as result of broad-based posterior disc bulge, thickening of  the ligamentum flavum, and facet hypertrophy.    L5-S1: Moderate lateral recess and moderate to severe neural  foraminal stenosis as result of broad-based posterior disc bulge and  facet joint hypertrophy. Annular fissuring of the intervertebral disc  is also noted (series 9, image 12).    The visualized portions of the sacrum and iliac bones demonstrate no  focal abnormality.    Edema and nonloculated fluid are noted in the posterior paraspinous  soft tissues overlying L4 and L5.    Impression  1. New acute to subacute L4 compression fracture with 40-45%  vertebral body height loss. Extension of edema into the L4 posterior  elements and overlying posterior paraspinous soft tissues. No  evidence of retropulsion. Consider CT for evaluation of the osseous  structures if clinically indicated. While  findings could be on an  insufficiency basis further follow-up examination to document marrow  signal recovery should be considered if there were clinical suspicion  of an underlying systemic process.  2. Multilevel degenerative changes of the lumbar spine as detailed  above, most severely at L2-3 and L3-4 with severe central canal  stenosis and compression of the cauda equina nerve roots.      In addition to the orange epic alert, findings were relayed via [employer]  secure chat to Dr. ROQUE CHACON at 9:39 am on 4/4/2024 by Radiology  resident Jenna Livingston MD.    MACRO:  Critical Finding:  See findings. Notification was initiated on  4/4/2024 at 9:48 am by  Jenna Livingston.  (**-OCF-**)    Signed by: Sky Reeves 4/4/2024 10:07 AM  Dictation workstation:   [id number]     Epidural Steroid Injection  Table formatting from the original result was not included.  Procedure  Epidural Steroid Injection    Indication  Lumbar radiculopathy    Medications  methylPREDNISolone acetate (DEPO-Medrol) injection 40 mg   lidocaine PF (Xylocaine) 5 mg/mL (0.5 %) injection 10 mL   iohexol (OMNIPaque) 240 mg iodine/mL solution 2 mL   (Totals for administrations occurring from 1202 to 1209 on 12/06/24)     Preprocedure  A history and physical has been performed, and patient medication   allergies have been reviewed. The patient's tolerance of previous   anesthesia has been reviewed. The risks and benefits of the procedure and   the sedation options and risks were discussed with the patient. All   questions were answered and informed consent obtained.    Details of the Procedure  Preoperative diagnosis/postoperative diagnosis: Status post   vertebroplasty, lumbar radiculitis, spondylolisthesis  Procedure: Caudal epidural steroid injection under fluoroscopic guidance  Surgeon: Stacie Gao  Assistant:  Fellow, Juan  Anesthesia: Local  Complications: Apparently none    Clinical note: Louis Rainey is a 74-year-old male with  a history of   back and leg symptoms who presents today for the aforementioned procedure.    Procedure note: The patient was met in the preoperative holding area after   risks benefits and alternatives to procedure were discussed with the   patient, informed consent was obtained. Patient brought back to the   procedure room and placed in the prone position on the fluoroscopy table.   Area over low back and caudal space was exposed, prepped, draped, in the   usual sterile fashion.  Sacral hiatus identified. Skin and subcutaneous   tissues to the aforementioned location was anesthetized using 0.5%   lidocaine.  23-gauge spinal needle was inserted in the skin and advanced.    Contrast was injected which showed appropriate epidural spread, no   intravascular or intrathecal uptake. A total of 9 mL of 0.5% percent   lidocaine mixed with 40 mg of methylprednisolone was injected. Needle   removed, bandage applied, patient tolerated the procedure well with no   immediate complications.    Procedure Provider  Stacie Gao MD    Procedure Location  [facility]  [address] [facility] [address] [phone]    Referring Provider  Stacie Gao MD  [address] [facility]  Department Of Anesthesiology [place] [address]       No diagnosis found.     ASSESSMENT/PLAN  Louis Rainey is a 74 y.o. male here for L3-4 LESI under fluoroscopy.    Stopped Coumadin 5 days ago.  POCT INR 1.4 today.  Discussed the risk and benefits with Louis of postponing procedure versus proceeding.  Decision made to proceed with procedure to minimize risk of repeat thromboembolism and stroke if he had to repeat cessation of anticoagulation.  Not currently taking DAPT or any other antiplatelet medications.    Patient denies any recent antibiotic use or infections, denies any blood thinner use, and denies contrast or local anesthetic allergies     Risks, benefits,  alternatives discussed. All questions answered to the best of my ability. Patient agrees to proceed.      Our plan is as follows:  - Proceed with aforementioned procedure          DO PASHA Nickerson Pain fellow

## 2025-02-21 ENCOUNTER — HOSPITAL ENCOUNTER (OUTPATIENT)
Facility: HOSPITAL | Age: 75
Discharge: HOME | End: 2025-02-21
Payer: MEDICARE

## 2025-02-21 VITALS
HEIGHT: 72 IN | SYSTOLIC BLOOD PRESSURE: 132 MMHG | BODY MASS INDEX: 30.07 KG/M2 | DIASTOLIC BLOOD PRESSURE: 81 MMHG | OXYGEN SATURATION: 96 % | RESPIRATION RATE: 16 BRPM | HEART RATE: 79 BPM | WEIGHT: 222 LBS | TEMPERATURE: 98.1 F

## 2025-02-21 DIAGNOSIS — M48.061 SPINAL STENOSIS OF LUMBAR REGION WITHOUT NEUROGENIC CLAUDICATION: ICD-10-CM

## 2025-02-21 PROCEDURE — 2550000001 HC RX 255 CONTRASTS: Performed by: ANESTHESIOLOGY

## 2025-02-21 PROCEDURE — 2500000004 HC RX 250 GENERAL PHARMACY W/ HCPCS (ALT 636 FOR OP/ED): Mod: JZ | Performed by: ANESTHESIOLOGY

## 2025-02-21 PROCEDURE — 62323 NJX INTERLAMINAR LMBR/SAC: CPT | Performed by: ANESTHESIOLOGY

## 2025-02-21 RX ORDER — METHYLPREDNISOLONE ACETATE 40 MG/ML
INJECTION, SUSPENSION INTRA-ARTICULAR; INTRALESIONAL; INTRAMUSCULAR; SOFT TISSUE
Status: COMPLETED | OUTPATIENT
Start: 2025-02-21 | End: 2025-02-21

## 2025-02-21 RX ORDER — LIDOCAINE HYDROCHLORIDE 5 MG/ML
INJECTION, SOLUTION INFILTRATION; INTRAVENOUS
Status: COMPLETED | OUTPATIENT
Start: 2025-02-21 | End: 2025-02-21

## 2025-02-21 RX ADMIN — LIDOCAINE HYDROCHLORIDE 9 ML: 5 INJECTION, SOLUTION INFILTRATION at 14:31

## 2025-02-21 RX ADMIN — IOHEXOL 3 ML: 240 INJECTION, SOLUTION INTRATHECAL; INTRAVASCULAR; INTRAVENOUS; ORAL at 14:31

## 2025-02-21 RX ADMIN — METHYLPREDNISOLONE ACETATE 40 MG: 40 INJECTION, SUSPENSION INTRA-ARTICULAR; INTRALESIONAL; INTRAMUSCULAR; SOFT TISSUE at 14:32

## 2025-02-21 ASSESSMENT — PAIN SCALES - GENERAL
PAINLEVEL_OUTOF10: 2
PAINLEVEL_OUTOF10: 5 - MODERATE PAIN
PAINLEVEL_OUTOF10: 5 - MODERATE PAIN
PAINLEVEL_OUTOF10: 7

## 2025-02-21 ASSESSMENT — PAIN - FUNCTIONAL ASSESSMENT
PAIN_FUNCTIONAL_ASSESSMENT: WONG-BAKER FACES
PAIN_FUNCTIONAL_ASSESSMENT: 0-10
PAIN_FUNCTIONAL_ASSESSMENT: 0-10
PAIN_FUNCTIONAL_ASSESSMENT: WONG-BAKER FACES

## 2025-02-21 NOTE — DISCHARGE INSTRUCTIONS
DISCHARGE INSTRUCTIONS FOR INJECTIONS     You underwent lumbar epidural steroid injection today    After most injections, it is recommended that you relax and limit your activity for the remainder of the day unless you have been told otherwise by your pain physician.  You should not drive a car, operate machinery, or make important legal decisions unless otherwise directed by your pain physician.  You may resume your normal activity, including exercise, tomorrow.      Keep a written pain diary of how much pain relief you experienced following the injection procedure and the length of time of pain relief you experienced pain relief. Following diagnostic injections like medial branch nerve blocks, sacroiliac joint blocks, stellate ganglion injections and other blocks, it is very important you record the specific amount of pain relief you experienced immediately after the injectionand how long it lasted. Your doctor will ask you for this information at your follow up visit.     For all injections, please keep the injection site dry and inspect the site for a couple of days. You may remove the Band-Aid the day of the injection at any time.     Some discomfort, bruising or slight swelling may occur at the injection site. This is not abnormal if it occurs.  If needed you may:    -Take over the counter medication such as Tylenol or Motrin.   -Apply an ice pack for 30 minutes, 2 to 3 times a day for the first 24 hours.     You may shower today; no soaking baths, hot tubs, whirlpools or swimming pools for two days.      If you are given steroids in your injection, it may take 3-5 days for the steroid medication to take effect. You may notice a worsening of your symptoms for 1-2 days after the injection. This is not abnormal.  You may use acetaminophen, ibuprofen, or prescription medication that your doctor may have prescribed for you if you need to do so.     A few common side effects of steroids include facial flushing,  sweating, restlessness, irritability,difficulty sleeping, increase in blood sugar, and increased blood pressure. If you have diabetes, please monitor your blood sugar at least once a day for at least 5 days. If you have poorly controlled high blood pressure, monitoryour blood pressure for at least 2 days and contact your primary care physician if these numbers are unusually high for you.      If you take aspirin or non-steroidal anti-inflammatory drugs (examples are Motrin, Advil, ibuprofen, Naprosyn, Voltaren, Relafen, etc.) you may restart these this evening, but stop taking it 3 days before your next appointment, unless instructed otherwiseby your physician.      You do not need to discontinue non-aspirin-containing pain medications prior to an injection (examples: Celebrex, tramadol, hydrocodone and acetaminophen).      If you take a blood thinning medication (Coumadin, Lovenox, Fragmin,Ticlid, Plavix, Pradaxa, etc.), please discuss this with your primary care physician/cardiologist and your pain physician. These medications MUST be discontinued before you can have an injection safely, without the risk of uncontrolled bleeding. If these medications are not discontinued for an appropriate period of time, you will not be able to receivean injection. Please adhere to instructions given to you about when to restart your blood thinning medication. If you have any questions please reach out to our team.    If you are taking Coumadin, please have your INR checked the morning of your procedure and bring the result to your appointment unless otherwise instructed. If your INR is over 1.2, your injection may need to be rescheduled to avoid uncontrolled bleeding from the needle placement.     Call [facility]  and ask for Pain Management at [phone] between 8am-4pm Monday - Friday if you are experiencing the following:    If you received an epidural or spinal injection:    -Headache that doesnot go away with medicine, is  worse when sitting or standing up, and is greatly relieved upon lying down.   -Severe pain worse than or different than your baseline pain.   -Chills or fever (101º F or greater).   -Drainage or signs of infection at the injection site     Go directly to the Emergency Department if you are experiencing the following and received an epidural or spinal injection:   -Abrupt weakness or progressive weakness in your legs that starts after you leave the clinic.   -Abrupt severe or worsening numbness in your legs.   -Inability to urinate after the injection or loss of bowel or bladder control without the urge to defecate or urinate.     If you have a clinical question that cannot wait until your next appointment, please call [phone] between 8am-4pm Monday - Friday or send a [employer] message. We do our best to return all non-emergency messages within 24 hours, Monday - Friday. A nurse or physician will return your message. You may also try calling Dr. Murray Quach's nurse ([phone]) and they will do their best to answer your question(s).    If you need to cancel an appointment, please call the scheduling staff at [phone] during normal business hours or leave a message at least 24 hours in advance.     If you are going to be sedated for your next procedure, you MUST have responsible adult who can legally drive accompany you home. You cannot eat or drink for at least eight hours prior to the planned procedure if you are going to receive sedation. You may take your non-blood thinning medications with a small sip of water.

## 2025-02-28 ENCOUNTER — ANTICOAGULATION - WARFARIN VISIT (OUTPATIENT)
Dept: CARDIOLOGY | Facility: CLINIC | Age: 75
End: 2025-02-28
Payer: MEDICARE

## 2025-02-28 ENCOUNTER — APPOINTMENT (OUTPATIENT)
Dept: CARDIOLOGY | Facility: CLINIC | Age: 75
End: 2025-02-28
Payer: MEDICARE

## 2025-02-28 DIAGNOSIS — I26.99 PULMONARY EMBOLISM, UNSPECIFIED CHRONICITY, UNSPECIFIED PULMONARY EMBOLISM TYPE, UNSPECIFIED WHETHER ACUTE COR PULMONALE PRESENT (MULTI): Primary | ICD-10-CM

## 2025-02-28 LAB
POC INR: 1.5
POC PROTHROMBIN TIME: NORMAL

## 2025-02-28 PROCEDURE — 99211 OFF/OP EST MAY X REQ PHY/QHP: CPT

## 2025-02-28 PROCEDURE — 85610 PROTHROMBIN TIME: CPT | Mod: QW

## 2025-02-28 NOTE — PROGRESS NOTES
Patient identification verified with 2 identifiers.    Location: [facility] at [facility] - [address] [address] [phone] option #1     Referring Physician: DR. LAURENCE PHAM  Enrollment/ Re-enrollment date: 2025 SENT   INR Goal: 2.0-3.0  INR monitoring is per AMS protocol.  Anticoagulation Medication: warfarin  Indication: Pulmonary Embolism (PE)    Subjective   Bleeding signs/symptoms: No    Bruising: No   Major bleeding event: No  Thrombosis signs/symptoms: No  Thromboembolic event: No  Missed doses: Yes  held 5 doses prior to spinal injection  Extra doses: No  Medication changes: No  Dietary changes: No  Change in health: No  Change in activity: No  Alcohol: No  Other concerns: No    Upcoming Procedures:  Does the Patient Have any upcoming procedures that require interruption in anticoagulation therapy?yes  Does the patient require bridging? no      Anticoagulation Summary  As of 2025      INR goal:  2.0-3.0   TTR:  49.7% (1.4 y)   INR used for dosin.50 (2025)   Weekly warfarin total:  37.5 mg               Assessment/Plan   SUBTHERAPEUTIC PT HELD 5 DOSES PRIOR TO SPINAL INJECTIONS. HAS BEEN BACK ON WARFARIN FOR 6 DAYS  INCREASE WEEKLY DOSE  RTC IN 1 WEEK    Education provided to patient during the visit:  Patient instructed to call in interim with questions, concerns and changes.   Patient educated on compliance with dosing, follow up appointments, and prescribed plan of care.

## 2025-03-07 ENCOUNTER — ANTICOAGULATION - WARFARIN VISIT (OUTPATIENT)
Dept: CARDIOLOGY | Facility: CLINIC | Age: 75
End: 2025-03-07
Payer: MEDICARE

## 2025-03-07 DIAGNOSIS — I26.99 PULMONARY EMBOLISM, UNSPECIFIED CHRONICITY, UNSPECIFIED PULMONARY EMBOLISM TYPE, UNSPECIFIED WHETHER ACUTE COR PULMONALE PRESENT (MULTI): Primary | ICD-10-CM

## 2025-03-07 LAB
POC INR: 2.2
POC PROTHROMBIN TIME: NORMAL

## 2025-03-07 PROCEDURE — 99211 OFF/OP EST MAY X REQ PHY/QHP: CPT

## 2025-03-07 PROCEDURE — 85610 PROTHROMBIN TIME: CPT | Mod: QW

## 2025-03-07 NOTE — PROGRESS NOTES
Patient identification verified with 2 identifiers.    Location: [facility] at [facility] - [address] [address] [phone] option #1     Referring Physician: DR. LAURENCE PHAM  Enrollment/ Re-enrollment date: 3/3/26  INR Goal: 2.0-3.0  INR monitoring is per AMS protocol.  Anticoagulation Medication: warfarin  Indication: Pulmonary Embolism (PE)    Subjective   Bleeding signs/symptoms: No    Bruising: No   Major bleeding event: No  Thrombosis signs/symptoms: No  Thromboembolic event: No  Missed doses: No  Extra doses: No  Medication changes: No  Dietary changes: No  Change in health: No  Change in activity: No  Alcohol: No  Other concerns: No    Upcoming Procedures:  Does the Patient Have any upcoming procedures that require interruption in anticoagulation therapy?yes  Does the patient require bridging? no      Anticoagulation Summary  As of 3/7/2025      INR goal:  2.0-3.0   TTR:  49.4% (1.4 y)   INR used for dosin.20 (3/7/2025)   Weekly warfarin total:  37.5 mg               Assessment/Plan   THERAPEUTIC  MAINTAIN TWD  RTC IN 1 WEEK    Education provided to patient during the visit:  Patient instructed to call in interim with questions, concerns and changes.   Patient educated on compliance with dosing, follow up appointments, and prescribed plan of care.

## 2025-03-14 ENCOUNTER — ANTICOAGULATION - WARFARIN VISIT (OUTPATIENT)
Dept: CARDIOLOGY | Facility: CLINIC | Age: 75
End: 2025-03-14
Payer: MEDICARE

## 2025-03-14 DIAGNOSIS — I26.99 PULMONARY EMBOLISM, UNSPECIFIED CHRONICITY, UNSPECIFIED PULMONARY EMBOLISM TYPE, UNSPECIFIED WHETHER ACUTE COR PULMONALE PRESENT (MULTI): Primary | ICD-10-CM

## 2025-03-14 LAB
POC INR: 2.4
POC PROTHROMBIN TIME: NORMAL

## 2025-03-14 PROCEDURE — 99211 OFF/OP EST MAY X REQ PHY/QHP: CPT

## 2025-03-14 PROCEDURE — 85610 PROTHROMBIN TIME: CPT | Mod: QW

## 2025-03-14 NOTE — PROGRESS NOTES
Patient identification verified with 2 identifiers.    Location: [facility] at [facility] - [address] [address] [phone] option #1     Referring Physician: DR. LAURENCE PHAM  Enrollment/ Re-enrollment date: 3/3/2026   INR Goal: 2.0-3.0  INR monitoring is per AMS protocol.  Anticoagulation Medication: warfarin  Indication: Pulmonary Embolism (PE)    Subjective   Bleeding signs/symptoms: No    Bruising: No   Major bleeding event: No  Thrombosis signs/symptoms: No  Thromboembolic event: No  Missed doses: No  Extra doses: No  Medication changes: No  Dietary changes: No  Change in health: No  Change in activity: No  Alcohol: No  Other concerns: No    Upcoming Procedures:  Does the Patient Have any upcoming procedures that require interruption in anticoagulation therapy? no  Does the patient require bridging? no      Anticoagulation Summary  As of 3/14/2025      INR goal:  2.0-3.0   TTR:  50.1% (1.4 y)   INR used for dosin.40 (3/14/2025)   Weekly warfarin total:  37.5 mg               Assessment/Plan   Therapeutic     1. New dose: no change    2. Next INR: 2 weeks      Education provided to patient during the visit:  Patient instructed to call in interim with questions, concerns and changes.   Patient educated on compliance with dosing, follow up appointments, and prescribed plan of care.

## 2025-03-28 ENCOUNTER — ANTICOAGULATION - WARFARIN VISIT (OUTPATIENT)
Dept: CARDIOLOGY | Facility: CLINIC | Age: 75
End: 2025-03-28
Payer: MEDICARE

## 2025-03-28 DIAGNOSIS — I26.99 PULMONARY EMBOLISM, UNSPECIFIED CHRONICITY, UNSPECIFIED PULMONARY EMBOLISM TYPE, UNSPECIFIED WHETHER ACUTE COR PULMONALE PRESENT (MULTI): Primary | ICD-10-CM

## 2025-03-28 LAB
POC INR: 3.2
POC PROTHROMBIN TIME: NORMAL

## 2025-03-28 PROCEDURE — 99211 OFF/OP EST MAY X REQ PHY/QHP: CPT

## 2025-03-28 PROCEDURE — 85610 PROTHROMBIN TIME: CPT | Mod: QW

## 2025-03-28 NOTE — PROGRESS NOTES
Patient identification verified with 2 identifiers.    Location: [facility] at [facility] - [address] [address] [phone] option #1     Referring Physician: DR. LAURENCE PHAM  Enrollment/ Re-enrollment date: 3/3/2026   INR Goal: 2.0-3.0  INR monitoring is per [facility] protocol.  Anticoagulation Medication: warfarin  Indication: Pulmonary Embolism (PE)    Subjective   Bleeding signs/symptoms: No    Bruising: No   Major bleeding event: No  Thrombosis signs/symptoms: No  Thromboembolic event: No  Missed doses: No  Extra doses: No  Medication changes: No  Dietary changes: Yes  not many gren vegetables  Change in health: No  Change in activity: No  Alcohol: No  Other concerns: No    Upcoming Procedures:  Does the Patient Have any upcoming procedures that require interruption in anticoagulation therapy? no  Does the patient require bridging? no      Anticoagulation Summary  As of 3/28/2025      INR goal:  2.0-3.0   TTR:  50.8% (1.5 y)   INR used for dosing:  3.20 (3/28/2025)   Weekly warfarin total:  37.5 mg               Assessment/Plan   Supra therapeutic in setting of not consuming many green vegetables. Patient will be more mindful of this.    1. New dose: no change    2. Next INR: 2 weeks per pt availability      Education provided to patient during the visit:  Patient instructed to call in interim with questions, concerns and changes.   Patient educated on compliance with dosing, follow up appointments, and prescribed plan of care.

## 2025-03-31 ENCOUNTER — APPOINTMENT (OUTPATIENT)
Dept: RADIOLOGY | Facility: HOSPITAL | Age: 75
End: 2025-03-31
Payer: MEDICARE

## 2025-03-31 ENCOUNTER — HOSPITAL ENCOUNTER (EMERGENCY)
Facility: HOSPITAL | Age: 75
Discharge: HOME | End: 2025-03-31
Attending: EMERGENCY MEDICINE
Payer: MEDICARE

## 2025-03-31 VITALS
HEART RATE: 90 BPM | WEIGHT: 222 LBS | SYSTOLIC BLOOD PRESSURE: 146 MMHG | BODY MASS INDEX: 30.11 KG/M2 | RESPIRATION RATE: 18 BRPM | OXYGEN SATURATION: 96 % | DIASTOLIC BLOOD PRESSURE: 76 MMHG | TEMPERATURE: 99.7 F

## 2025-03-31 DIAGNOSIS — M54.2 NECK PAIN: Primary | ICD-10-CM

## 2025-03-31 DIAGNOSIS — M50.90 CERVICAL DISC DISEASE: ICD-10-CM

## 2025-03-31 PROCEDURE — 72125 CT NECK SPINE W/O DYE: CPT

## 2025-03-31 PROCEDURE — 2500000001 HC RX 250 WO HCPCS SELF ADMINISTERED DRUGS (ALT 637 FOR MEDICARE OP): Performed by: EMERGENCY MEDICINE

## 2025-03-31 PROCEDURE — 99284 EMERGENCY DEPT VISIT MOD MDM: CPT | Mod: 25 | Performed by: EMERGENCY MEDICINE

## 2025-03-31 PROCEDURE — 2500000002 HC RX 250 W HCPCS SELF ADMINISTERED DRUGS (ALT 637 FOR MEDICARE OP, ALT 636 FOR OP/ED): Performed by: EMERGENCY MEDICINE

## 2025-03-31 PROCEDURE — 72125 CT NECK SPINE W/O DYE: CPT | Performed by: RADIOLOGY

## 2025-03-31 RX ORDER — ACETAMINOPHEN 500 MG
1000 TABLET ORAL EVERY 6 HOURS PRN
Qty: 30 TABLET | Refills: 0 | Status: SHIPPED | OUTPATIENT
Start: 2025-03-31 | End: 2025-04-10

## 2025-03-31 RX ORDER — ORPHENADRINE CITRATE 100 MG/1
100 TABLET, EXTENDED RELEASE ORAL ONCE
Status: COMPLETED | OUTPATIENT
Start: 2025-03-31 | End: 2025-03-31

## 2025-03-31 RX ORDER — ORPHENADRINE CITRATE 100 MG/1
100 TABLET, EXTENDED RELEASE ORAL 2 TIMES DAILY PRN
Qty: 20 TABLET | Refills: 0 | Status: SHIPPED | OUTPATIENT
Start: 2025-03-31 | End: 2025-04-10

## 2025-03-31 RX ORDER — ACETAMINOPHEN 325 MG/1
975 TABLET ORAL ONCE
Status: COMPLETED | OUTPATIENT
Start: 2025-03-31 | End: 2025-03-31

## 2025-03-31 RX ADMIN — ACETAMINOPHEN 975 MG: 325 TABLET, FILM COATED ORAL at 07:08

## 2025-03-31 RX ADMIN — ORPHENADRINE CITRATE 100 MG: 100 TABLET, EXTENDED RELEASE ORAL at 07:08

## 2025-03-31 ASSESSMENT — PAIN DESCRIPTION - PROGRESSION: CLINICAL_PROGRESSION: GRADUALLY IMPROVING

## 2025-03-31 ASSESSMENT — PAIN SCALES - GENERAL: PAINLEVEL_OUTOF10: 8

## 2025-03-31 ASSESSMENT — PAIN DESCRIPTION - LOCATION: LOCATION: NECK

## 2025-03-31 ASSESSMENT — PAIN DESCRIPTION - PAIN TYPE: TYPE: ACUTE PAIN

## 2025-03-31 ASSESSMENT — PAIN - FUNCTIONAL ASSESSMENT
PAIN_FUNCTIONAL_ASSESSMENT: 0-10
PAIN_FUNCTIONAL_ASSESSMENT: 0-10

## 2025-03-31 NOTE — ED TRIAGE NOTES
From home for c/o neck pain and stiffness onset Friday. Pain extending to the right shoulder, Pt states pain and stiffness is getting progressively worse. Pt denies injury to the area.

## 2025-03-31 NOTE — ED PROVIDER NOTES
HPI   No chief complaint on file.      HPI: []  74-year-old  male with a history of hypertension, CKD, GERD, DVT and pulm embolism on Coumadin chronic back pain comes in with atraumatic neck pain.  He states the last 24 hours evaluate about neck pain.  Does radiate to his right upper extremity.  No trauma no falls.  Hurts with movement.  No fever no chills.  No swelling.  No paresthesias.  No weakness.  Otherwise no complaints.  No chest pain shortness breath fever chills cough congestion incontinence seizures syncope or Nisky be no hematemesis melena because of hemoptysis last INR was 3.22 days ago.    Past history: Hypertension, CKD, GERD, pulmonary embolism, DVT, chronic back pain with multiple back surgeries  Social: Patient denies current tobacco alcohol drug abuse.  REVIEW OF SYSTEMS:    GENERAL.: No weight loss, fatigue, anorexia, insomnia, fever.    EYES: No vision loss, double vision, drainage, eye pain.    ENT: No pharyngitis, dry mouth.    CARDIOPULMONARY: No chest pain, palpitations, syncope, near syncope. No shortness of breath, cough, hemoptysis.    GI: No abdominal pain, change in bowel habits, melena, hematemesis, hematochezia, nausea, vomiting, diarrhea.    : No discharge, dysuria, frequency, urgency, hematuria.    MS: No limb pain, joint pain, joint swelling.  Positive for neck pain    SKIN: No rashes.    PSYCH: No depression, anxiety, suicidality, homicidality.    Review of systems is otherwise negative unless stated above or in history of present illness.  Social history, family history, allergies reviewed.  PHYSICAL EXAM:    GENERAL: Vitals noted, no distress. Alert and oriented  x 3. Non-toxic.  Appears uncomfortable    EENT: TMs clear. Posterior oropharynx unremarkable. No meningismus. No LAD.     NECK: Stiff, patient tender palpation over his mid C-spine and bilateral paraspinal muscles and right trapezius muscle.    CARDIAC: Regular, rate, rhythm. No murmurs rubs or gallops. No  JVD    PULMONARY: Lungs clear bilaterally with good aeration. No wheezes rales or rhonchi. No respiratory distress.     ABDOMEN: Soft, nonsurgical. Nontender. No peritoneal signs. Normoactive bowel sounds. No pulsatile masses.     EXTREMITIES: No peripheral edema. Negative Homans bilaterally, no cords.  2+ bounding pulses well-perfused.    SKIN: No rash. Intact.     NEURO: No focal neurologic deficits, NIH score of 0. Cranial nerves normal as tested from II through XII.  Patient has a normal neuroexamination his sensory exam is normal motor exam is normal equal preserved DTRs equal and symmetric.    MEDICAL DECISION MAKING:  CT of the C-spine was done shows advanced DJD.    Treatment in the ED: Patient was given a Tylenol and oral Norflex.  ED course: Repeat assessment feeling remarkably better was able to move his neck remains neurologic intact.  Impression: #1 acute cervical radiculopathy/cervical spine DJD  Plan set MDM: 76-year-old male on Coumadin comes in with atraumatic neck pain which appears to be quite mechanical musculoskeletal due to cervical spine DJD my suspicion for epidural hematoma discitis osteomyelitis spinal epidural abscess or spinal cord compression is low.  Given absence of trauma and normal examination no fever, patient be discharged home Tylenol and Norflex, advised and outpatient follow-up with primary doctor outpatient follow recommended with strict return precaution.              Patient History   Past Medical History:   Diagnosis Date    Anemia     Arrhythmia     SVT/ PVCs-(Min Feb-March2022), AFIB    Arthritis     Forrest's esophagus     CKD (chronic kidney disease)     stage III    Erectile dysfunction     GERD (gastroesophageal reflux disease)     HTN (hypertension)     Low back pain     Lumbar compression fracture (Multi)     Lumbar spondylosis     PE (pulmonary thromboembolism) (Multi) 2013    on Coumadin    Prostate cancer (Multi)     s/p RT follows with Paul Navarrete, CNP     Spinal stenosis     Syncope      Past Surgical History:   Procedure Laterality Date    COLONOSCOPY      ESOPHAGOGASTRODUODENOSCOPY      OTHER SURGICAL HISTORY  2019    Knee surgery    OTHER SURGICAL HISTORY  2019    Shoulder surgery    OTHER SURGICAL HISTORY  2019    Gallbladder surgery     Family History   Problem Relation Name Age of Onset    Cancer Mother      Heart attack Father       Social History     Tobacco Use    Smoking status: Former     Current packs/day: 0.00     Types: Cigarettes     Quit date: 2019     Years since quittin.9     Passive exposure: Past    Smokeless tobacco: Never   Vaping Use    Vaping status: Never Used   Substance Use Topics    Alcohol use: Yes     Comment: 10 drinks a month    Drug use: Never       Physical Exam   ED Triage Vitals   Temperature Heart Rate Respirations BP   25 0524 25 0524 25 0524 25 0524   37.6 °C (99.7 °F) 94 20 146/76      Pulse Ox Temp Source Heart Rate Source Patient Position   25 0524 25 0524 25 1129 25 05   (!) 93 % Temporal Monitor Sitting      BP Location FiO2 (%)     25 0524 --     Right arm        Physical Exam      ED Course & [facility]   ED Course as of 25 1143   Mon Mar 31, 2025   1118 CT of the C-spine shows Karin DJD, no obvious epidural hematoma, patient's pain well-controlled with Tylenol and Norflex will be discharged home with the Tylenol and Norflex advised outpatient MRI of the spine outpatient pain management and strict return precaution. [MT]      ED Course User Index  [MT] Katie Aguilera MD         Diagnoses as of 25 1143   Neck pain   Cervical disc disease                 No data recorded     Hudson Coma Scale Score: 15 (25 0525 : Opal Servin, ANN)                           Medical Decision Making      Procedure  Procedures     Katie Aguilera MD  25 1146

## 2025-04-11 ENCOUNTER — ANTICOAGULATION - WARFARIN VISIT (OUTPATIENT)
Dept: CARDIOLOGY | Facility: CLINIC | Age: 75
End: 2025-04-11
Payer: MEDICARE

## 2025-04-11 DIAGNOSIS — I26.99 PULMONARY EMBOLISM, UNSPECIFIED CHRONICITY, UNSPECIFIED PULMONARY EMBOLISM TYPE, UNSPECIFIED WHETHER ACUTE COR PULMONALE PRESENT (MULTI): Primary | ICD-10-CM

## 2025-04-11 LAB
POC INR: 2.2
POC PROTHROMBIN TIME: NORMAL

## 2025-04-11 PROCEDURE — 85610 PROTHROMBIN TIME: CPT | Mod: QW

## 2025-04-11 PROCEDURE — 99211 OFF/OP EST MAY X REQ PHY/QHP: CPT

## 2025-04-11 NOTE — PROGRESS NOTES
Patient identification verified with 2 identifiers.    Location: [facility] at [facility] - [address] [address] [phone] option #1     Referring Physician: DR. LAURENCE PHAM    Enrollment/ Re-enrollment date: 3/3/2026   INR Goal: 2.0-3.0  INR monitoring is per [facility] protocol.  Anticoagulation Medication: warfarin  Indication: Pulmonary Embolism (PE)    Subjective   Bleeding signs/symptoms: No    Bruising: No   Major bleeding event: No  Thrombosis signs/symptoms: No  Thromboembolic event: No  Missed doses: No  Extra doses: No  Medication changes: No  Dietary changes: No  Change in health: No  Change in activity: No  Alcohol: No  Other concerns: No    Upcoming Procedures:  Does the Patient Have any upcoming procedures that require interruption in anticoagulation therapy? no  Does the patient require bridging? no      Anticoagulation Summary  As of 2025      INR goal:  2.0-3.0   TTR:  51.5% (1.5 y)   INR used for dosin.20 (2025)   Weekly warfarin total:  37.5 mg               Assessment/Plan   Therapeutic     1. New dose: no change    2. Next INR: 2 weeks      Education provided to patient during the visit:  Patient instructed to call in interim with questions, concerns and changes.   Patient educated on compliance with dosing, follow up appointments, and prescribed plan of care.

## 2025-04-29 ENCOUNTER — OFFICE VISIT (OUTPATIENT)
Dept: CARDIOLOGY | Facility: HOSPITAL | Age: 75
End: 2025-04-29
Payer: MEDICARE

## 2025-04-29 VITALS
SYSTOLIC BLOOD PRESSURE: 125 MMHG | HEART RATE: 53 BPM | BODY MASS INDEX: 30.34 KG/M2 | HEIGHT: 72 IN | RESPIRATION RATE: 16 BRPM | WEIGHT: 224 LBS | DIASTOLIC BLOOD PRESSURE: 68 MMHG

## 2025-04-29 DIAGNOSIS — Z79.01 CHRONIC ANTICOAGULATION: ICD-10-CM

## 2025-04-29 DIAGNOSIS — I80.201: Primary | ICD-10-CM

## 2025-04-29 DIAGNOSIS — Z86.711 HISTORY OF PULMONARY EMBOLISM: ICD-10-CM

## 2025-04-29 PROCEDURE — 1160F RVW MEDS BY RX/DR IN RCRD: CPT | Performed by: INTERNAL MEDICINE

## 2025-04-29 PROCEDURE — 3078F DIAST BP <80 MM HG: CPT | Performed by: INTERNAL MEDICINE

## 2025-04-29 PROCEDURE — 1159F MED LIST DOCD IN RCRD: CPT | Performed by: INTERNAL MEDICINE

## 2025-04-29 PROCEDURE — 1036F TOBACCO NON-USER: CPT | Performed by: INTERNAL MEDICINE

## 2025-04-29 PROCEDURE — 99214 OFFICE O/P EST MOD 30 MIN: CPT | Performed by: INTERNAL MEDICINE

## 2025-04-29 PROCEDURE — G2211 COMPLEX E/M VISIT ADD ON: HCPCS | Performed by: INTERNAL MEDICINE

## 2025-04-29 PROCEDURE — 3074F SYST BP LT 130 MM HG: CPT | Performed by: INTERNAL MEDICINE

## 2025-04-29 PROCEDURE — 1125F AMNT PAIN NOTED PAIN PRSNT: CPT | Performed by: INTERNAL MEDICINE

## 2025-04-29 ASSESSMENT — PAIN SCALES - GENERAL: PAINLEVEL_OUTOF10: 8

## 2025-04-29 NOTE — PATIENT INSTRUCTIONS
ASSESSMENT/PLAN:    Here for follow up recurrent VTE - idiopathic  and RLE DVT 7/24 after back surgery. Remains on warfarin - doing well. INR followed by [facility]. No bleeding on the AC. Plans to continue the AC indefinitely. Can hold around injections for the back as needed. For future surgeries - may need lovenox for transitioning. Follow up annually.

## 2025-04-29 NOTE — PROGRESS NOTES
OUTPATIENT FOLLOW-UP -  VASCULAR MEDICINE    DOS: 4/29/25  Last seen:    12/3/24    REQUESTING PHYSICIAN:  Dr. Hector Mejia    REASON FOR FOLLOW-UP:  here for follow up RLE DVT 7/24    HISTORY OF PRESENT ILLNESS:     75 yo man here for follow up RLE DVT 7/24. Remains on warfarin. Using a med-alert bracelet. No bleeding on the AC. Not on DOAC bc of cost. Reports having a lot of pain, back and neck. Recently has his annual physical and reports renal function decline.     From prev notes:  right LE DVT July 2024. This was in the setting of a subtherapeutic INR following MIS  L2-3, L4-3 lami, L4 kyphoplasty. -This was done minimally invasively and admitted x 1 day. Restarted warfarin 5 days after surgery (7/1). Rec pre-op were to hold 5 days pre and resume within 2-3 days. 6/30 was seen in the ED for CP - felt to be MS.  7/5 INR was  1.2.  This was after 4 doses by his report. 7/5 developed RLE pain and swelling - in ED US +There is noncompressibility with intraluminal echogenic material and severely reduced, but preserved flow within the popliteal and  partially visualized posterior tibial and peroneal veins. CT was done 6/30 at the ED visit for CP - neg for PE. INR was 2.3 on 7/10.      H/o idiopathic PE 2013 on long term AC. Remains on warfarin. Las visit was going to start xarelto but this was reportedly too expensive. No bleeding on the warfarin. Pending back surgery June 26 for disc disease, also reports OA and fracture. This is planned for ?overnight but to be done minimally invasively. Unclear how long after th e procedure the AC will need to be interrupted. Reports prostate cancer is stable. Reports off all meds - just followed clinically now.     REVIEW OF SYSTEMS:     weight is stable  No CP, chest pressure  No cough, SOB  No BRBPR, melena, hematuria  No bleeding  No edema, no calf pain    PHYSICAL EXAMINATION:   /68 (BP Location: Left arm, Patient Position: Sitting)   Pulse 53   Resp 16   Ht 1.829 m  (6')   Wt 102 kg (224 lb)   BMI 30.38 kg/m²     Gen: Appears well, NAD  Ext: no edema, nontender  Skin: good condition without wounds or lesions  Pulses: WWP  Mood and affect appropriate    ADDITIONAL DATA:   no compression worn  right calf:  44.0cm left calf:  41.5cm     ASSESSMENT/PLAN:    Here for follow up recurrent VTE - idiopathic  and RLE DVT 7/24 after back surgery. Remains on warfarin - doing well. INR followed by [facility]. No bleeding on the AC. Plans to continue the AC indefinitely. Can hold around injections for the back as needed. For future surgeries - may need lovenox for transitioning. Follow up annually.

## 2025-05-06 ENCOUNTER — OFFICE VISIT (OUTPATIENT)
Dept: ORTHOPEDIC SURGERY | Facility: HOSPITAL | Age: 75
End: 2025-05-06
Payer: MEDICARE

## 2025-05-06 VITALS — BODY MASS INDEX: 29.66 KG/M2 | HEIGHT: 72 IN | WEIGHT: 219 LBS

## 2025-05-06 DIAGNOSIS — M47.812 CERVICAL SPONDYLOSIS: Primary | ICD-10-CM

## 2025-05-06 DIAGNOSIS — M54.2 MYOFASCIAL NECK PAIN: ICD-10-CM

## 2025-05-06 PROCEDURE — 20553 NJX 1/MLT TRIGGER POINTS 3/>: CPT | Performed by: STUDENT IN AN ORGANIZED HEALTH CARE EDUCATION/TRAINING PROGRAM

## 2025-05-06 PROCEDURE — 99212 OFFICE O/P EST SF 10 MIN: CPT | Performed by: STUDENT IN AN ORGANIZED HEALTH CARE EDUCATION/TRAINING PROGRAM

## 2025-05-06 PROCEDURE — 2500000004 HC RX 250 GENERAL PHARMACY W/ HCPCS (ALT 636 FOR OP/ED): Mod: JW | Performed by: STUDENT IN AN ORGANIZED HEALTH CARE EDUCATION/TRAINING PROGRAM

## 2025-05-06 RX ORDER — PREDNISONE 10 MG/1
TABLET ORAL
Qty: 35 TABLET | Refills: 0 | Status: SHIPPED | OUTPATIENT
Start: 2025-05-06

## 2025-05-06 RX ORDER — CYCLOBENZAPRINE HCL 5 MG
5 TABLET ORAL 2 TIMES DAILY PRN
Qty: 60 TABLET | Refills: 0 | Status: SHIPPED | OUTPATIENT
Start: 2025-05-06 | End: 2025-06-05

## 2025-05-06 RX ADMIN — LIDOCAINE HYDROCHLORIDE 4 ML: 10 INJECTION, SOLUTION EPIDURAL; INFILTRATION; INTRACAUDAL; PERINEURAL at 15:00

## 2025-05-06 ASSESSMENT — PAIN SCALES - GENERAL: PAINLEVEL_OUTOF10: 7

## 2025-05-06 ASSESSMENT — PAIN - FUNCTIONAL ASSESSMENT: PAIN_FUNCTIONAL_ASSESSMENT: 0-10

## 2025-05-06 NOTE — PROGRESS NOTES
Assessment     Louis is a 74 y.o. male with significant past medical history of CKD, GERD, DVT/PE on Coumadin, L4 vertebral compression fracture s/p minimally invasive L2-L3 and L3-L4 laminectomy, who presents with neck pain  .  The patient's symptoms, clinical exam and imaging studies are suggestive of cervical spondylosis .  The other possible differential diagnosis(es) include(s):  myofacial neck pain.      Plan     At this time, I would like to make the following recommendation/plan:  -  Physical Therapy: can continue with chiropractor provided home exercise program    -  Medication: prescribed prednisone taper and flexeril. No NSAIDs as patient in on coumadin   -  Injection: trigger point injections to b/l trapezius and cervical spine  -  Studies: Interpreted: CT scan of cervical spine: multilevel severe spondylosis with DJD  -  advised to use TENs unit    Follow up:  Follow up in 2 months.    Subjective    Chief Complaint: neck pain     History of Present Illness:  Louis Rainey is a 74 y.o. male,  retired , with pertinent PMH of CKD, GERD, DVT/PE on Coumadin, L4 vertebral compression fracture s/p minimally invasive L2-L3 and L3-L4 laminectomy, today for neck pain.   Pain first started on 03/30/25, without inciting event. The pain as located in the b/l neck .  - presented to the ED   Pain:        Severity:  Louis Rainey states pain is: 9/10 at it's worst. On average pain is 5/10 (Scale 0-no pain, 10-worst pain)      Quality:  sharp.       Radiating: non-radiating      Aggravating Factors: turning head, tilting down      Alleviating Factors:  rest, neck support      Time of day: regardless of time of day      Associated symptoms:  intermittent numbness or tingling in the hands (Hx CTS); no balance problems     Physical Therapy/Occupational Therapy/Other Modalities:    - Chiropractor/OMT: completed 6 sessions. 04/07/25; helpeful  - Acupuncture: hasn't tried   - Medical message: hasn't tried   -  PT session: hasn't tried        Topicals:   ICE/Heat: ice helpful   Topicals (Capsicin/Diclofenac gel/Salonpas/Lidocaine): hasn't tried     Medications:   - Over the counter : (Tylenol, NSAIDs)  didn't try tylenol. on coumadin can't take  - Steroid: hasn't tried   - Gabapentin/Lyrica: hasn't tried   - Muscle relaxer: orphenadrine dried his mouth out       Current Medications[1]    RX Allergies[2]    Injections:    Date/Injection Type/Location/%relief/ Lasting  -12/06/24 caudal not helpful  - 02/21/25  L3-4 LESI under fluoroscopy. not helpful  - 03/06/24 LINCOLN Dr harrington was helpful     Surgery:  Date/Type/Location/%relief/ Lasting  -  L4 vertebral compression fracture s/p minimally invasive L2-L3 and L3-L4 laminectomy, bilateral partial medial facetectomies and kyphoplasty done on June 26, 2024 with Dr. Atkinson   Surgical History[3]    Medical History[4]      ROS:  - Sleep: Sleep isn't disrupted secondary to pain  - Bowel/Bladder: Denies bowel/bladder dysfunction  - Falls: Denies fall  - Weight changes: Denies  - Mood/Psych: Denies any feelings of anxiety or depression    12-point review of systems was completed and is otherwise negative except as noted in the HPI.      Social Hx:  - Home: Lives alone and house  - ADLs: Independent in ADLs and ambulation without assistive device.   - Hobbies: walking, exercises, socialize limited 2/2 pain  - Work:  retired   - Smoking/Alcohol/Drugs: No/socially/No    Objective     Physical Exam:  General:  Appears state age, in NAD, and overweight  Psychological:  Normal mood and affect  Pulm:  Breathing comfortably on RA    Gait:   - Normal  - Without assistive device  - able to heel walk, able to toe walk    Inspection:   -  forward head posture,left lipoma of neck   - No erythema, swelling/edema, ecchymosis noted  - normal muscle bulk of bilateral upper extremity     Sensation:  - Intact to light touch in bilateral upper extremity    Palpation:  - tenderness to palpation of  bilateral trapezius   - tenderness to palpation of bilateral spinal paraspinals at the level of cervical spine   - No tenderness to palpation of spinous process at the level of cervical spine    Range of Motion:  - very limited 5 degrees diffuse  Cervico-thoracic  flexion/extension/rotation/sidebending  - Full painless bilateral shoulders    Strength:  Side Shoulder Abduction  (C5) Elbow Flexion (C5) Elbow Extension (C7) Wrist Extension (C6) Finger Flexion (DIP)  (C8) Finger Abduction  (T1)   Right 5 5 5 5 5 5   Left 5 5 5 5 5 5     Reflexes:  Side Biceps (C5) Brachioradialis (C6)  Triceps (C7)   Right 0 0 NT   Left 0 0 NT     Special tests:  - Acute radiculopathy (Spurling test/ Bakody sign): neg b/l   - facet loading pos b/l     Imaging and Other Studies:    CT CERVICAL SPINE WO IV CONTRAST; 3/31/2025 7:40 am      INDICATION:  CLINICAL DATA: Signs/Symptoms:atraumatic mid line c spine pain on  coumadin.      COMPARISON:  08/24/2023      ACCESSION NUMBER(S):  [id number]      ORDERING CLINICIAN:  RHONA ABDI      TECHNIQUE:  A helical acquisition of data was obtained with multiplanar  reconstructions performed.      One or more of the following dose reduction techniques were used:  Automated exposure control Adjustment of the mA and/or kV according  to patient size, and/or use of iterative reconstruction technique.      FINDINGS:  No fractures or destructive lesions are identified. There is  straightening of the cervical curvature. There is disc space  narrowing with anterior and posterior marginal spurring from C3  through T1. There is minimal anterior slippage of C2 relative to C3  measure on the order of 2 mm. There is left-sided facet spurring at  C2-3 and from C4 through T1. There is right-sided facet spurring from  C2 through T1.      There is foraminal stenosis from uncovertebral spurring bilateral at  C3-4, on the left at C4-5, bilaterally at C5-6, and bilaterally at  C6-7.      IMPRESSION:  No evidence  for a fracture on this exam. Cervical spondylosis as  described.    Trigger Point Injection: right cervical paraspinals, left cervical paraspinals, right upper trapezius, left upper trapezius on 5/6/2025 3:00 PM  Medications: 4 mL lidocaine PF 10 mg/mL (1 %)  Procedure, treatment alternatives, risks and benefits explained, specific risks discussed. Consent was given by the patient. Patient was prepped and draped in the usual sterile fashion.                [1]   Current Outpatient Medications:     amLODIPine (Norvasc) 5 mg tablet, Take 1 tablet (5 mg) by mouth once daily., Disp: , Rfl:     calcium carbonate (Oscal) 500 mg calcium (1,250 mg) tablet, Take 1 tablet (1,250 mg) by mouth once daily., Disp: , Rfl:     cholecalciferol (Vitamin D-3) 10 mcg (400 unit) capsule, Take 1 capsule (10 mcg) by mouth once daily., Disp: , Rfl:     cyanocobalamin (Vitamin B-12) 1,000 mcg tablet, Take 1 tablet (1,000 mcg) by mouth once daily., Disp: , Rfl:     esomeprazole (NexIUM) 40 mg DR capsule, Take 1 capsule (40 mg) by mouth once daily in the morning. Take before meals. Taking PRN, Disp: , Rfl:     multivitamin tablet, Take 1 tablet by mouth once daily., Disp: , Rfl:     orphenadrine (Norflex) 100 mg 12 hr tablet, Take 1 tablet (100 mg) by mouth 2 times a day as needed for muscle spasms for up to 10 days. Do not crush, chew, or split. (Patient not taking: Reported on 4/29/2025), Disp: 20 tablet, Rfl: 0    warfarin (Coumadin) 7.5 mg tablet, Take as directed per After Visit Summary., Disp: 90 tablet, Rfl: 2  [2]   Allergies  Allergen Reactions    Cefazolin Sodium Unknown    Cephalexin Unknown    Cephalosporins Unknown     states was mild and it was 20 years ago   [3]   Past Surgical History:  Procedure Laterality Date    COLONOSCOPY      ESOPHAGOGASTRODUODENOSCOPY      OTHER SURGICAL HISTORY  04/23/2019    Knee surgery    OTHER SURGICAL HISTORY  04/23/2019    Shoulder surgery    OTHER SURGICAL HISTORY  04/23/2019    Gallbladder  surgery   [4]   Past Medical History:  Diagnosis Date    Anemia     Arrhythmia     SVT/ PVCs-(Familiaopadaphney Feb-March2022), AFIB    Arthritis     Forrest's esophagus     CKD (chronic kidney disease)     stage III    Erectile dysfunction     GERD (gastroesophageal reflux disease)     HTN (hypertension)     Low back pain     Lumbar compression fracture (Multi)     Lumbar spondylosis     PE (pulmonary thromboembolism) (Multi) 2013    on Coumadin    Prostate cancer (Multi)     s/p RT follows with Paul Navarrete, CNP    Spinal stenosis     Syncope

## 2025-05-09 ENCOUNTER — ANTICOAGULATION - WARFARIN VISIT (OUTPATIENT)
Dept: CARDIOLOGY | Facility: CLINIC | Age: 75
End: 2025-05-09
Payer: MEDICARE

## 2025-05-09 DIAGNOSIS — I26.99 PULMONARY EMBOLISM, UNSPECIFIED CHRONICITY, UNSPECIFIED PULMONARY EMBOLISM TYPE, UNSPECIFIED WHETHER ACUTE COR PULMONALE PRESENT (MULTI): Primary | ICD-10-CM

## 2025-05-09 LAB
POC INR: 5.6 (ref 0.9–1.1)
POC PROTHROMBIN TIME: NORMAL (ref 9.3–12.5)

## 2025-05-09 PROCEDURE — 99211 OFF/OP EST MAY X REQ PHY/QHP: CPT

## 2025-05-09 PROCEDURE — 85610 PROTHROMBIN TIME: CPT | Mod: QW

## 2025-05-09 RX ORDER — LIDOCAINE HYDROCHLORIDE 10 MG/ML
4 INJECTION, SOLUTION EPIDURAL; INFILTRATION; INTRACAUDAL; PERINEURAL
Status: COMPLETED | OUTPATIENT
Start: 2025-05-06 | End: 2025-05-06

## 2025-05-09 NOTE — PROGRESS NOTES
Patient identification verified with 2 identifiers.    Location: [facility] at [facility] - [address] [address] [phone] option #1     Referring Physician: Dr Vela  Enrollment/ Re-enrollment date: 3/3/2026   INR Goal: 2.0-3.0  INR monitoring is per [facility] protocol.  Anticoagulation Medication: warfarin  Indication: Pulmonary Embolism (PE)    Subjective   Bleeding signs/symptoms: No    Bruising: No   Major bleeding event: No  Thrombosis signs/symptoms: No  Thromboembolic event: No  Missed doses: No  Extra doses: No  Medication changes: Yes  Patient started prednisone dose pack and cyclobenzaprine .  Dietary changes: No  Change in health: No  Change in activity: No  Alcohol: No  Other concerns: No    Upcoming Procedures:  Does the Patient Have any upcoming procedures that require interruption in anticoagulation therapy? no  Does the patient require bridging? no      Anticoagulation Summary  As of 2025      INR goal:  2.0-3.0   TTR:  50.1% (1.6 y)   INR used for dosin.60 (2025)   Weekly warfarin total:  30 mg               Assessment/Plan   Supratherapeutic     1. New dose: Spoke to Dr Vela. Provided Dr Vela update to today's INR, INR trend, and patient starting cyclobenzaprine and prednisone dose pack 25.  Dr. Vela with plan of care hold warfarin for two days, decrease warfarin dose by 20% and patient to return to clinic .  Plan of care read back to and verified by Dr. Vela.  Discussed plan of care with patient and patient verbalized understanding and patient declines to return to [facility] clinic until .  Updated Dr Vela to date patient able to return to [facility] clinic and Dr Vela aware and patient scheduled to return for INR on .  Discussed with patient importance of contacting clinic when new medications are started.        2. Next INR: 7 days      Education provided to patient during the visit:  Patient instructed  to call in interim with questions, concerns and changes.   Patient educated on interactions between medications and warfarin.   Patient educated on dietary consistency in vitamin k consumption.   Patient educated on affects of alcohol consumption while taking warfarin.   Patient educated on signs of bleeding/clotting.   Patient educated on compliance with dosing, follow up appointments, and prescribed plan of care.

## 2025-05-16 ENCOUNTER — ANTICOAGULATION - WARFARIN VISIT (OUTPATIENT)
Dept: CARDIOLOGY | Facility: CLINIC | Age: 75
End: 2025-05-16
Payer: MEDICARE

## 2025-05-16 DIAGNOSIS — I26.99 PULMONARY EMBOLISM, UNSPECIFIED CHRONICITY, UNSPECIFIED PULMONARY EMBOLISM TYPE, UNSPECIFIED WHETHER ACUTE COR PULMONALE PRESENT (MULTI): Primary | ICD-10-CM

## 2025-05-16 LAB
POC INR: 1.9 (ref 0.9–1.1)
POC PROTHROMBIN TIME: ABNORMAL (ref 9.3–12.5)

## 2025-05-16 PROCEDURE — 85610 PROTHROMBIN TIME: CPT | Mod: QW

## 2025-05-16 PROCEDURE — 99211 OFF/OP EST MAY X REQ PHY/QHP: CPT

## 2025-05-16 NOTE — PROGRESS NOTES
Patient identification verified with 2 identifiers.    Location: [facility] at [facility] - [address] [address] [phone] option #1     Referring Physician: Dr Vela  Enrollment/ Re-enrollment date: 3/3/2026   INR Goal: 2.0-3.0  INR monitoring is per [facility] protocol.  Anticoagulation Medication: warfarin  Indication: Pulmonary Embolism (PE)    Subjective   Bleeding signs/symptoms: No    Bruising: No   Major bleeding event: No  Thrombosis signs/symptoms: No  Thromboembolic event: No  Missed doses: No  Extra doses: No  Medication changes: Yes  remains on prednisone  Dietary changes: No  Change in health: No  Change in activity: No  Alcohol: No  Other concerns: No    Upcoming Procedures:  Does the Patient Have any upcoming procedures that require interruption in anticoagulation therapy? no  Does the patient require bridging? no      Anticoagulation Summary  As of 2025      INR goal:  2.0-3.0   TTR:  49.8% (1.6 y)   INR used for dosin.90 (2025)   Weekly warfarin total:  30 mg               Assessment/Plan   Sub therapeutic  Maintain TWD  RTC in 1 week.    Education provided to patient during the visit:  Patient instructed to call in interim with questions, concerns and changes.   Patient educated on interactions between medications and warfarin.   Patient educated on dietary consistency in vitamin k consumption.   Patient educated on affects of alcohol consumption while taking warfarin.   Patient educated on signs of bleeding/clotting.   Patient educated on compliance with dosing, follow up appointments, and prescribed plan of care.

## 2025-05-23 ENCOUNTER — ANTICOAGULATION - WARFARIN VISIT (OUTPATIENT)
Dept: CARDIOLOGY | Facility: CLINIC | Age: 75
End: 2025-05-23
Payer: MEDICARE

## 2025-05-23 DIAGNOSIS — I26.99 PULMONARY EMBOLISM, UNSPECIFIED CHRONICITY, UNSPECIFIED PULMONARY EMBOLISM TYPE, UNSPECIFIED WHETHER ACUTE COR PULMONALE PRESENT (MULTI): Primary | ICD-10-CM

## 2025-05-23 LAB
POC INR: 2.2 (ref 0.9–1.1)
POC PROTHROMBIN TIME: ABNORMAL (ref 9.3–12.5)

## 2025-05-23 PROCEDURE — 99211 OFF/OP EST MAY X REQ PHY/QHP: CPT

## 2025-05-23 PROCEDURE — 85610 PROTHROMBIN TIME: CPT | Mod: QW

## 2025-05-23 NOTE — PROGRESS NOTES
Opened in error  
Patient identification verified with 2 identifiers.    Location: [facility] at [facility] - [address] [address] [phone] option #1     Referring Physician: Dr Vela  Enrollment/ Re-enrollment date: 3/3/2026   INR Goal: 2.0-3.0  INR monitoring is per [facility] protocol.  Anticoagulation Medication: warfarin  Indication: Pulmonary Embolism (PE)    Subjective   Bleeding signs/symptoms: No    Bruising: No   Major bleeding event: No  Thrombosis signs/symptoms: No  Thromboembolic event: No  Missed doses: No  Extra doses: No  Medication changes: No  Dietary changes: No  Change in health: No  Change in activity: No  Alcohol: No  Other concerns: No    Upcoming Procedures:  Does the Patient Have any upcoming procedures that require interruption in anticoagulation therapy? no  Does the patient require bridging? no      Anticoagulation Summary  As of 2025      INR goal:  2.0-3.0   TTR:  50.0% (1.6 y)   INR used for dosin.20 (2025)   Weekly warfarin total:  30 mg               Assessment/Plan   therapeutic  Maintain TWD  RTC in 1 week.    Education provided to patient during the visit:  Patient instructed to call in interim with questions, concerns and changes.   Patient educated on interactions between medications and warfarin.   Patient educated on dietary consistency in vitamin k consumption.   Patient educated on affects of alcohol consumption while taking warfarin.   Patient educated on signs of bleeding/clotting.   Patient educated on compliance with dosing, follow up appointments, and prescribed plan of care.        
DC instructions

## 2025-05-30 ENCOUNTER — APPOINTMENT (OUTPATIENT)
Dept: CARDIOLOGY | Facility: CLINIC | Age: 75
End: 2025-05-30
Payer: MEDICARE

## 2025-05-30 ENCOUNTER — ANTICOAGULATION - WARFARIN VISIT (OUTPATIENT)
Dept: CARDIOLOGY | Facility: CLINIC | Age: 75
End: 2025-05-30
Payer: MEDICARE

## 2025-05-30 DIAGNOSIS — I26.99 PULMONARY EMBOLISM, UNSPECIFIED CHRONICITY, UNSPECIFIED PULMONARY EMBOLISM TYPE, UNSPECIFIED WHETHER ACUTE COR PULMONALE PRESENT (MULTI): Primary | ICD-10-CM

## 2025-06-03 ENCOUNTER — APPOINTMENT (OUTPATIENT)
Dept: CARDIOLOGY | Facility: CLINIC | Age: 75
End: 2025-06-03
Payer: MEDICARE

## 2025-06-06 ENCOUNTER — APPOINTMENT (OUTPATIENT)
Dept: CARDIOLOGY | Facility: CLINIC | Age: 75
End: 2025-06-06
Payer: MEDICARE

## 2025-06-06 ENCOUNTER — ANTICOAGULATION - WARFARIN VISIT (OUTPATIENT)
Dept: CARDIOLOGY | Facility: CLINIC | Age: 75
End: 2025-06-06
Payer: MEDICARE

## 2025-06-06 DIAGNOSIS — I26.99 PULMONARY EMBOLISM, UNSPECIFIED CHRONICITY, UNSPECIFIED PULMONARY EMBOLISM TYPE, UNSPECIFIED WHETHER ACUTE COR PULMONALE PRESENT (MULTI): Primary | ICD-10-CM

## 2025-06-06 LAB
POC INR: 2.5 (ref 0.9–1.1)
POC PROTHROMBIN TIME: ABNORMAL (ref 9.3–12.5)

## 2025-06-06 PROCEDURE — 85610 PROTHROMBIN TIME: CPT | Mod: QW

## 2025-06-06 PROCEDURE — 99211 OFF/OP EST MAY X REQ PHY/QHP: CPT

## 2025-06-06 NOTE — PROGRESS NOTES
Patient identification verified with 2 identifiers.    Location: [facility] at [facility] - [address] [address] [phone] option #1     Referring Physician: Dr Vela  Enrollment/ Re-enrollment date: 3/3/2026   INR Goal: 2.0-3.0  INR monitoring is per [facility] protocol.  Anticoagulation Medication: warfarin  Indication: Pulmonary Embolism (PE)    Subjective   Bleeding signs/symptoms: No    Bruising: No   Major bleeding event: No  Thrombosis signs/symptoms: No  Thromboembolic event: No  Missed doses: No  Extra doses: No  Medication changes: No  Dietary changes: No  Change in health: No  Change in activity: No  Alcohol: No  Other concerns: No    Upcoming Procedures:  Does the Patient Have any upcoming procedures that require interruption in anticoagulation therapy? no  Does the patient require bridging? no      Anticoagulation Summary  As of 2025      INR goal:  2.0-3.0   TTR:  51.2% (1.6 y)   INR used for dosin.50 (2025)   Weekly warfarin total:  30 mg               Assessment/Plan   Therapeutic  Maintain TWD  RTC in 2 weeks.    Education provided to patient during the visit:  Patient instructed to call in interim with questions, concerns and changes.   Patient educated on interactions between medications and warfarin.   Patient educated on dietary consistency in vitamin k consumption.   Patient educated on affects of alcohol consumption while taking warfarin.   Patient educated on signs of bleeding/clotting.   Patient educated on compliance with dosing, follow up appointments, and prescribed plan of care.

## 2025-06-20 ENCOUNTER — ANTICOAGULATION - WARFARIN VISIT (OUTPATIENT)
Dept: CARDIOLOGY | Facility: CLINIC | Age: 75
End: 2025-06-20
Payer: MEDICARE

## 2025-06-20 DIAGNOSIS — I26.99 PULMONARY EMBOLISM, UNSPECIFIED CHRONICITY, UNSPECIFIED PULMONARY EMBOLISM TYPE, UNSPECIFIED WHETHER ACUTE COR PULMONALE PRESENT (MULTI): Primary | ICD-10-CM

## 2025-06-20 LAB
POC INR: 1.5 (ref 0.9–1.1)
POC PROTHROMBIN TIME: ABNORMAL (ref 9.3–12.5)

## 2025-06-20 PROCEDURE — 85610 PROTHROMBIN TIME: CPT | Mod: QW

## 2025-06-20 PROCEDURE — 99211 OFF/OP EST MAY X REQ PHY/QHP: CPT

## 2025-06-20 NOTE — PROGRESS NOTES
Patient identification verified with 2 identifiers.    Location: [facility] at [facility] - [address] [address] [phone] option #1     Referring Physician: Dr Vela  Enrollment/ Re-enrollment date: 3/3/2026   INR Goal: 2.0-3.0  INR monitoring is per [facility] protocol.  Anticoagulation Medication: warfarin  Indication: Pulmonary Embolism (PE)    Subjective   Bleeding signs/symptoms: No    Bruising: No   Major bleeding event: No  Thrombosis signs/symptoms: No  Thromboembolic event: No  Missed doses: No  Extra doses: No  Medication changes: No  Dietary changes: No  Change in health: No  Change in activity: No  Alcohol: No  Other concerns: No    Upcoming Procedures:  Does the Patient Have any upcoming procedures that require interruption in anticoagulation therapy? no  Does the patient require bridging? no      Anticoagulation Summary  As of 2025      INR goal:  2.0-3.0   TTR:  51.1% (1.7 y)   INR used for dosin.50 (2025)   Weekly warfarin total:  33.75 mg               Assessment/Plan   SUB THERAPEUTIC. NO IDENTIFIABLE CAUSE.  WILL INCREASE WEEKLY DOSE  RTC IN 1 WEEK  Education provided to patient during the visit:  Patient instructed to call in interim with questions, concerns and changes.   Patient educated on interactions between medications and warfarin.   Patient educated on dietary consistency in vitamin k consumption.   Patient educated on affects of alcohol consumption while taking warfarin.   Patient educated on signs of bleeding/clotting.   Patient educated on compliance with dosing, follow up appointments, and prescribed plan of care.

## 2025-06-27 ENCOUNTER — ANTICOAGULATION - WARFARIN VISIT (OUTPATIENT)
Dept: CARDIOLOGY | Facility: CLINIC | Age: 75
End: 2025-06-27
Payer: MEDICARE

## 2025-06-27 DIAGNOSIS — I26.99 PULMONARY EMBOLISM, UNSPECIFIED CHRONICITY, UNSPECIFIED PULMONARY EMBOLISM TYPE, UNSPECIFIED WHETHER ACUTE COR PULMONALE PRESENT (MULTI): Primary | ICD-10-CM

## 2025-06-27 LAB
POC INR: 2 (ref 0.9–1.1)
POC PROTHROMBIN TIME: ABNORMAL (ref 9.3–12.5)

## 2025-06-27 PROCEDURE — 85610 PROTHROMBIN TIME: CPT | Mod: QW

## 2025-06-27 PROCEDURE — 99211 OFF/OP EST MAY X REQ PHY/QHP: CPT

## 2025-06-27 NOTE — PROGRESS NOTES
Patient identification verified with 2 identifiers.    Location: [facility] at [facility] - [address] [address] [phone] option #1     Referring Physician: Dr Vela  Enrollment/ Re-enrollment date: 3/3/2026   INR Goal: 2.0-3.0  INR monitoring is per [facility] protocol.  Anticoagulation Medication: warfarin  Indication: Pulmonary Embolism (PE)    Subjective   Bleeding signs/symptoms: No    Bruising: No   Major bleeding event: No  Thrombosis signs/symptoms: No  Thromboembolic event: No  Missed doses: No  Extra doses: No  Medication changes: No  Dietary changes: No  Change in health: No  Change in activity: No  Alcohol: No  Other concerns: No    Upcoming Procedures:  Does the Patient Have any upcoming procedures that require interruption in anticoagulation therapy? no  Does the patient require bridging? no      Anticoagulation Summary  As of 2025      INR goal:  2.0-3.0   TTR:  50.6% (1.7 y)   INR used for dosin.00 (2025)   Weekly warfarin total:  33.75 mg               Assessment/Plan   Therapeutic  Maintain TWD  RTC in 2 weeks d/t holiday in one week and pt availability.  Education provided to patient during the visit:  Patient instructed to call in interim with questions, concerns and changes.   Patient educated on interactions between medications and warfarin.   Patient educated on dietary consistency in vitamin k consumption.   Patient educated on affects of alcohol consumption while taking warfarin.   Patient educated on signs of bleeding/clotting.   Patient educated on compliance with dosing, follow up appointments, and prescribed plan of care.

## 2025-07-01 ENCOUNTER — APPOINTMENT (OUTPATIENT)
Dept: NEPHROLOGY | Facility: CLINIC | Age: 75
End: 2025-07-01
Payer: MEDICARE

## 2025-07-01 VITALS
WEIGHT: 228 LBS | DIASTOLIC BLOOD PRESSURE: 81 MMHG | HEIGHT: 72 IN | BODY MASS INDEX: 30.88 KG/M2 | SYSTOLIC BLOOD PRESSURE: 147 MMHG | TEMPERATURE: 98.2 F | HEART RATE: 79 BPM

## 2025-07-01 DIAGNOSIS — N18.31 STAGE 3A CHRONIC KIDNEY DISEASE (MULTI): Primary | ICD-10-CM

## 2025-07-01 DIAGNOSIS — E55.9 VITAMIN D DEFICIENCY: ICD-10-CM

## 2025-07-01 DIAGNOSIS — I47.29 NONSUSTAINED VENTRICULAR TACHYCARDIA (MULTI): ICD-10-CM

## 2025-07-01 DIAGNOSIS — J44.1 CHRONIC OBSTRUCTIVE PULMONARY DISEASE WITH (ACUTE) EXACERBATION (MULTI): ICD-10-CM

## 2025-07-01 DIAGNOSIS — I48.0 PAROXYSMAL ATRIAL FIBRILLATION (MULTI): ICD-10-CM

## 2025-07-01 DIAGNOSIS — I48.91 ATRIAL FIBRILLATION, UNSPECIFIED TYPE (MULTI): ICD-10-CM

## 2025-07-01 DIAGNOSIS — I10 ESSENTIAL (PRIMARY) HYPERTENSION: ICD-10-CM

## 2025-07-01 NOTE — PROGRESS NOTES
Reason For Consult  Elevated creatinine    History Of Present Illness  Louis Rainey is a 74 y.o. male with a history of low back pain over the last couple of years, he had a laminectomy in 2024 for lumbar stenosis, required a fusion but I am afraid it did not help his symptoms.  He has a history of reflux with Forrest's esophagus, hypertension, had a pulmonary embolus for which he is still on Coumadin.  He has a history of prostate cancer status post XRT, he was in a study with an oral chemotherapy agent.  He has had 6 knee surgeries, 3 on each side, a left shoulder surgery, cholecystectomy and right inguinal hernia repair.  No diabetes, has never had a myocardial infarction, stroke, gout, kidney stones.  As his creatinine has been elevated, he comes in today.    On social history no tobacco in 10 years, social alcohol, he was a .  No obvious heavy metal exposures.    On family history both mother and sister with cancer, unknown types.  There is no chronic kidney disease in the family.    On review of systems no anti-inflammatory use, hematuria, foamy urine, blood transfusions or hepatitis.     Past Medical History  He has a past medical history of Anemia, Arrhythmia, Arthritis, Forrest's esophagus, CKD (chronic kidney disease), Erectile dysfunction, GERD (gastroesophageal reflux disease), HTN (hypertension), Low back pain, Lumbar compression fracture (Multi), Lumbar spondylosis, PE (pulmonary thromboembolism) (Multi) (2013), Prostate cancer (Multi), Spinal stenosis, and Syncope.    Surgical History  He has a past surgical history that includes Other surgical history (04/23/2019); Other surgical history (04/23/2019); Other surgical history (04/23/2019); Colonoscopy; and Esophagogastroduodenoscopy.    Allergies  Cefazolin sodium, Cephalexin, and Cephalosporins    Current Medications[1]     There are no discontinued medications.      Social History  He reports that he quit smoking about 6 years ago.  His smoking use included cigarettes. He has been exposed to tobacco smoke. He has never used smokeless tobacco. He reports current alcohol use. He reports that he does not use drugs.    Family History  Family History[2]       Physical Exam  Constitutional:       Appearance: Normal appearance.   HENT:      Mouth/Throat:      Mouth: Mucous membranes are moist.   Eyes:      Extraocular Movements: Extraocular movements intact.      Pupils: Pupils are equal, round, and reactive to light.   Cardiovascular:      Rate and Rhythm: irregular rhythm.      Heart sounds: S1 normal and S2 normal.   Pulmonary:      Breath sounds: Normal breath sounds.   Abdominal:      Comments: Soft, NT/ND, no masses, normal bowel sounds   Genitourinary:     Comments: No rollins  Musculoskeletal:      Right lower leg: No edema.      Left lower leg: No edema.   Skin:     General: Skin is warm and dry.   Neurological:      General: No focal deficit present.      Mental Status: She is alert and oriented to person, place, and time.   Psychiatric:         Behavior: Behavior normal.      Last Recorded Vitals  Blood pressure 147/81, pulse 79, temperature 36.8 °C (98.2 °F), temperature source Temporal, height 1.829 m (6'), weight 103 kg (228 lb).    Last 24 hour lab Results  No results found. However, due to the size of the patient record, not all encounters were searched. Please check Results Review for a complete set of results.     Imaging results   Imaging  No results found.    Cardiology, Vascular, and Other Imaging  No other imaging results found for the past 7 days        Assessment/Plan   Mr. Rainey looks to have rather longstanding stage 3a chronic kidney disease.  Although his creatinine was in the 1.3 range last November, he had been running 1.5-1.6 mg/dL for years.  His creatinine was fairly stable at 1.58 mg/dL back in February, no acidosis, electrolytes look good, normal liver enzymes.  His glucose has been high at times, he looks to have  impaired fasting glucose.  His CBC has been fairly normal, he has always lacked protein or blood in the urine which is a good sign.  He had a renal ultrasound in 2013 that noted increased echogenicity bilaterally, left kidney was slightly small but there were no masses.  He did have a prominent prostate.    I will quantify the albumin in the urine, recheck a renal ultrasound.  If he has any protein in the urine I will consider genetic testing for apolipoprotein L1 genetic risk variants.  It may be sensible to do it anyway to protect next generations.  But his blood pressure averages 117/79 mmHg at home.  I will check a 25 vitamin D and intact PTH as well.  No changes pending lab results.    Chuy Ellison MD         [1]   Current Outpatient Medications:     amLODIPine (Norvasc) 5 mg tablet, Take 1 tablet (5 mg) by mouth once daily., Disp: , Rfl:     calcium carbonate (Oscal) 500 mg calcium (1,250 mg) tablet, Take 1 tablet (1,250 mg) by mouth once daily., Disp: , Rfl:     cholecalciferol (Vitamin D-3) 10 mcg (400 unit) capsule, Take 1 capsule (10 mcg) by mouth once daily., Disp: , Rfl:     cyanocobalamin (Vitamin B-12) 1,000 mcg tablet, Take 1 tablet (1,000 mcg) by mouth once daily., Disp: , Rfl:     esomeprazole (NexIUM) 40 mg DR capsule, Take 1 capsule (40 mg) by mouth once daily in the morning. Take before meals. Taking PRN, Disp: , Rfl:     multivitamin tablet, Take 1 tablet by mouth once daily., Disp: , Rfl:     warfarin (Coumadin) 7.5 mg tablet, Take as directed per After Visit Summary., Disp: 90 tablet, Rfl: 2    orphenadrine (Norflex) 100 mg 12 hr tablet, Take 1 tablet (100 mg) by mouth 2 times a day as needed for muscle spasms for up to 10 days. Do not crush, chew, or split. (Patient not taking: Reported on 7/1/2025), Disp: 20 tablet, Rfl: 0    predniSONE (Deltasone) 10 mg tablet, Please take after breakfast:  4 TABS X 2 DAYS, 3 TABS X  2 DAYS, 2 TABS X 7 DAYS, 1 TAB  X  7 DAYS (Patient not taking: Reported  on 7/1/2025), Disp: 35 tablet, Rfl: 0  [2]   Family History  Problem Relation Name Age of Onset    Cancer Mother      Heart attack Father

## 2025-07-11 ENCOUNTER — ANTICOAGULATION - WARFARIN VISIT (OUTPATIENT)
Dept: CARDIOLOGY | Facility: CLINIC | Age: 75
End: 2025-07-11
Payer: MEDICARE

## 2025-07-11 DIAGNOSIS — I26.99 PULMONARY EMBOLISM, UNSPECIFIED CHRONICITY, UNSPECIFIED PULMONARY EMBOLISM TYPE, UNSPECIFIED WHETHER ACUTE COR PULMONALE PRESENT (MULTI): Primary | ICD-10-CM

## 2025-07-11 LAB
POC INR: 2.9 (ref 0.9–1.1)
POC PROTHROMBIN TIME: ABNORMAL (ref 9.3–12.5)

## 2025-07-11 PROCEDURE — 85610 PROTHROMBIN TIME: CPT | Mod: QW

## 2025-07-11 PROCEDURE — 99211 OFF/OP EST MAY X REQ PHY/QHP: CPT

## 2025-07-11 NOTE — PROGRESS NOTES
Patient identification verified with 2 identifiers.    Location: [facility] at [facility] - [address] [address] [phone] option #1     Referring Physician: Dr Vela  Enrollment/ Re-enrollment date: 3/3/2026   INR Goal: 2.0-3.0  INR monitoring is per [facility] protocol.  Anticoagulation Medication: warfarin  Indication: Pulmonary Embolism (PE)    Subjective   Bleeding signs/symptoms: No    Bruising: No   Major bleeding event: No  Thrombosis signs/symptoms: No  Thromboembolic event: No  Missed doses: No  Extra doses: No  Medication changes: No  Dietary changes: No  Change in health: No  Change in activity: No  Alcohol: No  Other concerns: No    Upcoming Procedures:  Does the Patient Have any upcoming procedures that require interruption in anticoagulation therapy? no  Does the patient require bridging? no      Anticoagulation Summary  As of 2025      INR goal:  2.0-3.0   TTR:  51.6% (1.7 y)   INR used for dosin.90 (2025)   Weekly warfarin total:  33.75 mg               Assessment/Plan   Therapeutic  Maintain weekly dose  Return to clinic in 2 weeks    Education provided to patient during the visit:  Patient instructed to call in interim with questions, concerns and changes.   Patient educated on interactions between medications and warfarin.   Patient educated on dietary consistency in vitamin k consumption.   Patient educated on affects of alcohol consumption while taking warfarin.   Patient educated on signs of bleeding/clotting.   Patient educated on compliance with dosing, follow up appointments, and prescribed plan of care.

## 2025-07-15 ENCOUNTER — OFFICE VISIT (OUTPATIENT)
Dept: ORTHOPEDIC SURGERY | Facility: HOSPITAL | Age: 75
End: 2025-07-15
Payer: MEDICARE

## 2025-07-15 DIAGNOSIS — M47.816 LUMBAR SPONDYLOSIS: ICD-10-CM

## 2025-07-15 DIAGNOSIS — G89.4 CHRONIC PAIN DISORDER: Primary | ICD-10-CM

## 2025-07-15 PROCEDURE — 99212 OFFICE O/P EST SF 10 MIN: CPT | Performed by: STUDENT IN AN ORGANIZED HEALTH CARE EDUCATION/TRAINING PROGRAM

## 2025-07-15 RX ORDER — DULOXETIN HYDROCHLORIDE 20 MG/1
20 CAPSULE, DELAYED RELEASE ORAL NIGHTLY
Qty: 30 CAPSULE | Refills: 2 | Status: SHIPPED | OUTPATIENT
Start: 2025-07-15

## 2025-07-15 ASSESSMENT — PAIN - FUNCTIONAL ASSESSMENT: PAIN_FUNCTIONAL_ASSESSMENT: 0-10

## 2025-07-15 ASSESSMENT — PAIN DESCRIPTION - DESCRIPTORS: DESCRIPTORS: DISCOMFORT

## 2025-07-15 ASSESSMENT — PAIN SCALES - GENERAL: PAINLEVEL_OUTOF10: 10 - WORST POSSIBLE PAIN

## 2025-07-15 NOTE — PROGRESS NOTES
Assessment     Louis is a 74 y.o. male with significant past medical history of  CKD, GERD, DVT/PE on Coumadin, L4 vertebral compression fracture s/p minimally invasive L2-L3 and L3-L4 laminectomy, who presents with neck pain  .  The patient's symptoms, clinical exam and imaging studies are suggestive of cervical spondylosis .  The other possible differential diagnosis(es) include(s):  myofacial neck pain.      His symptoms have improved since the last visit.  Trigger point injection, Physical Therapy and medication helped significantly.    Injections Hx:    Date/Injection Type/Location/%relief/ Lasting  - 03/06/24: LINCOLN Dr harrington was helpful   - 12/06/24: caudal not helpful  - 02/21/25:  L3-4 LESI under fluoroscopy. not helpful  - 05/06/25: trigger point injection in the neck with excellent relief.      Surgery:  Date/Type/Location/%relief/ Lasting  -  L4 vertebral compression fracture s/p minimally invasive L2-L3 and L3-L4 laminectomy, bilateral partial medial facetectomies and kyphoplasty done on June 26, 2024 with Dr. Atkinson     Plan     At this time, I would like to make the following recommendation/plan:  -  Physical Therapy: ***  -  Medication: *** failed gabapentin but it was at a low dose. Can try  try higher dose    - Injections: {INJECTIONS FOR PAIN PMR:99530}.  -  Imaging: Interpreted: ***  - Referrals: ***  -  ***    Follow up:  Follow up in ***      Subjective    Chief Complaint: neck pain     HPI:  Louis Rainey is a 74 y.o. male, ***, with pertinent PMH of *** who is following up today for neck pain.   Pain first started on 03/30/25, without inciting event.  He was last seen on 05/06/25. Plan at the time was chiropractor, trigger pt injection, prednisone and flexeril.  Since his last visit symptoms have improved by 80% with improved range of motion of the neck . Today, pain is located b/l suboccipital pain, described as sharp non-radiating, 2/10.    Since last visit:  -  Chiropractor/OMT: completed 6  sessions. 04/07/25; helpeful   - Medication: Tried flexeril and oral steroid with excellent benefit for neck  - Injections:  trigger point injection on last visit with excellent benefit for neck    ROS:  - Sleep: Sleep isn't disrupted secondary to pain  - Bowel/Bladder: Denies bowel/bladder dysfunction  - Falls: Denies fall  - Weight changes: Denies  - Mood/Psych: Denies any feelings of anxiety or depression    12-point review of systems was completed and is otherwise negative except as noted in the HPI.       Social Hx:  - Home: Lives alone and house  - ADLs: Independent in ADLs and ambulation without assistive device.   - Hobbies: walking, exercises, socialize limited 2/2 pain  - Work:  retired   - Smoking/Alcohol/Drugs: No/socially/No    Objective     Physical Exam  General:  Appears state age, in NAD, and well nourished  Psychological:  Normal mood and affect  Pulm:  Breathing comfortably on RA    Gait:   - Normal  - Without assistive device  - able to heel walk, able to toe walk     Inspection:   -  forward head posture,left lipoma of neck   - No erythema, swelling/edema, ecchymosis noted  - normal muscle bulk of bilateral upper extremity      Sensation:  - Intact to light touch in bilateral upper extremity     Palpation:  - tenderness to palpation of bilateral trapezius   - tenderness to palpation of bilateral spinal paraspinals at the level of cervical spine   - No tenderness to palpation of spinous process at the level of cervical spine     Range of Motion:  - very limited 5 degrees diffuse  Cervico-thoracic  flexion/extension/rotation/sidebending  - Full painless bilateral shoulders     Strength:  Side Shoulder Abduction  (C5) Elbow Flexion (C5) Elbow Extension (C7) Wrist Extension (C6) Finger Flexion (DIP)  (C8) Finger Abduction  (T1)   Right 5 5 5 5 5 5   Left 5 5 5 5 5 5      Reflexes:  Side Biceps (C5) Brachioradialis (C6)  Triceps (C7)   Right 0 0 NT   Left 0 0 NT      Special tests:  - Acute  radiculopathy (Spurling test/ Bakody sign): neg b/l   - facet loading pos b/l     Imaging and Other Studies:    CT CERVICAL SPINE WO IV CONTRAST; 3/31/2025 7:40 am      INDICATION:  CLINICAL DATA: Signs/Symptoms:atraumatic mid line c spine pain on  coumadin.      COMPARISON:  08/24/2023      ACCESSION NUMBER(S):  [id number]      ORDERING CLINICIAN:  RHONA ABDI      TECHNIQUE:  A helical acquisition of data was obtained with multiplanar  reconstructions performed.      One or more of the following dose reduction techniques were used:  Automated exposure control Adjustment of the mA and/or kV according  to patient size, and/or use of iterative reconstruction technique.      FINDINGS:  No fractures or destructive lesions are identified. There is  straightening of the cervical curvature. There is disc space  narrowing with anterior and posterior marginal spurring from C3  through T1. There is minimal anterior slippage of C2 relative to C3  measure on the order of 2 mm. There is left-sided facet spurring at  C2-3 and from C4 through T1. There is right-sided facet spurring from  C2 through T1.      There is foraminal stenosis from uncovertebral spurring bilateral at  C3-4, on the left at C4-5, bilaterally at C5-6, and bilaterally at  C6-7.      IMPRESSION:  No evidence for a fracture on this exam. Cervical spondylosis as  described.

## 2025-07-25 ENCOUNTER — ANTICOAGULATION - WARFARIN VISIT (OUTPATIENT)
Dept: CARDIOLOGY | Facility: CLINIC | Age: 75
End: 2025-07-25
Payer: MEDICARE

## 2025-07-25 DIAGNOSIS — I26.99 PULMONARY EMBOLISM, UNSPECIFIED CHRONICITY, UNSPECIFIED PULMONARY EMBOLISM TYPE, UNSPECIFIED WHETHER ACUTE COR PULMONALE PRESENT (MULTI): Primary | ICD-10-CM

## 2025-07-25 LAB
POC INR: 2.4 (ref 0.9–1.1)
POC PROTHROMBIN TIME: ABNORMAL (ref 9.3–12.5)

## 2025-07-25 PROCEDURE — 85610 PROTHROMBIN TIME: CPT | Mod: QW | Performed by: INTERNAL MEDICINE

## 2025-07-25 PROCEDURE — 99211 OFF/OP EST MAY X REQ PHY/QHP: CPT

## 2025-07-25 NOTE — PROGRESS NOTES
Patient identification verified with 2 identifiers.    Location: [facility] at [facility] - [address] [address] [phone] option #1     Referring Physician: Dr Vela  Enrollment/ Re-enrollment date: 3/3/2026   INR Goal: 2.0-3.0  INR monitoring is per [facility] protocol.  Anticoagulation Medication: warfarin  Indication: Pulmonary Embolism (PE)    Subjective   Bleeding signs/symptoms: No    Bruising: No   Major bleeding event: No  Thrombosis signs/symptoms: No  Thromboembolic event: No  Missed doses: No  Extra doses: No  Medication changes: No  Dietary changes: No  Change in health: No  Change in activity: No  Alcohol: No  Other concerns: No    Upcoming Procedures:  Does the Patient Have any upcoming procedures that require interruption in anticoagulation therapy? no  Does the patient require bridging? no      Anticoagulation Summary  As of 2025      INR goal:  2.0-3.0   TTR:  52.7% (1.8 y)   INR used for dosin.40 (2025)   Weekly warfarin total:  33.75 mg               Assessment/Plan   Therapeutic  Maintain weekly dose  Return to clinic in 4 weeks per pt availability    Education provided to patient during the visit:  Patient instructed to call in interim with questions, concerns and changes.   Patient educated on interactions between medications and warfarin.   Patient educated on dietary consistency in vitamin k consumption.   Patient educated on affects of alcohol consumption while taking warfarin.   Patient educated on signs of bleeding/clotting.   Patient educated on compliance with dosing, follow up appointments, and prescribed plan of care.

## 2025-07-30 ENCOUNTER — HOSPITAL ENCOUNTER (OUTPATIENT)
Dept: RADIOLOGY | Facility: CLINIC | Age: 75
Discharge: HOME | End: 2025-07-30
Payer: MEDICARE

## 2025-07-30 DIAGNOSIS — E55.9 VITAMIN D DEFICIENCY: ICD-10-CM

## 2025-07-30 DIAGNOSIS — I10 ESSENTIAL (PRIMARY) HYPERTENSION: ICD-10-CM

## 2025-07-30 DIAGNOSIS — N18.31 STAGE 3A CHRONIC KIDNEY DISEASE (MULTI): ICD-10-CM

## 2025-07-30 PROCEDURE — 76770 US EXAM ABDO BACK WALL COMP: CPT

## 2025-08-07 ENCOUNTER — TELEPHONE (OUTPATIENT)
Dept: RADIATION ONCOLOGY | Facility: HOSPITAL | Age: 75
End: 2025-08-07
Payer: MEDICARE

## 2025-08-07 NOTE — TELEPHONE ENCOUNTER
Called pt. to remind of appointment on 8/11/2025  at  2:30 pm with ADAM Navarrete.  Pt answered and confirmed appointment

## 2025-08-08 ASSESSMENT — ENCOUNTER SYMPTOMS
COUGH: 0
BACK PAIN: 0
DIARRHEA: 0
RECTAL PAIN: 0
ARTHRALGIAS: 0
ANAL BLEEDING: 0
FREQUENCY: 0
WEAKNESS: 0
PSYCHIATRIC NEGATIVE: 1
BLOOD IN STOOL: 0
CONSTIPATION: 0
PALPITATIONS: 0
CHEST TIGHTNESS: 0
HEMATURIA: 0
SHORTNESS OF BREATH: 0
FEVER: 0
UNEXPECTED WEIGHT CHANGE: 0
DYSURIA: 0
ABDOMINAL PAIN: 0
ALLERGIC/IMMUNOLOGIC NEGATIVE: 1
DIZZINESS: 0
DIFFICULTY URINATING: 0
FATIGUE: 0
JOINT SWELLING: 0

## 2025-08-11 ENCOUNTER — DOCUMENTATION (OUTPATIENT)
Dept: RADIATION ONCOLOGY | Facility: HOSPITAL | Age: 75
End: 2025-08-11

## 2025-08-11 ENCOUNTER — LAB (OUTPATIENT)
Dept: LAB | Facility: HOSPITAL | Age: 75
End: 2025-08-11
Payer: MEDICARE

## 2025-08-11 ENCOUNTER — TELEPHONE (OUTPATIENT)
Dept: ORTHOPEDIC SURGERY | Facility: CLINIC | Age: 75
End: 2025-08-11
Payer: MEDICARE

## 2025-08-11 ENCOUNTER — HOSPITAL ENCOUNTER (OUTPATIENT)
Dept: RADIATION ONCOLOGY | Facility: HOSPITAL | Age: 75
Setting detail: RADIATION/ONCOLOGY SERIES
Discharge: HOME | End: 2025-08-11
Payer: MEDICARE

## 2025-08-11 VITALS
DIASTOLIC BLOOD PRESSURE: 79 MMHG | OXYGEN SATURATION: 95 % | SYSTOLIC BLOOD PRESSURE: 146 MMHG | TEMPERATURE: 97.7 F | BODY MASS INDEX: 30.94 KG/M2 | HEART RATE: 96 BPM | WEIGHT: 228.1 LBS | RESPIRATION RATE: 18 BRPM

## 2025-08-11 DIAGNOSIS — C61 MALIGNANT NEOPLASM OF PROSTATE (MULTI): ICD-10-CM

## 2025-08-11 DIAGNOSIS — C61 MALIGNANT NEOPLASM OF PROSTATE (MULTI): Primary | ICD-10-CM

## 2025-08-11 DIAGNOSIS — C61 ADENOCARCINOMA OF PROSTATE (MULTI): ICD-10-CM

## 2025-08-11 LAB
ALBUMIN SERPL BCP-MCNC: 4.2 G/DL (ref 3.4–5)
ALP SERPL-CCNC: 55 U/L (ref 33–136)
ALT SERPL W P-5'-P-CCNC: 17 U/L (ref 10–52)
ANION GAP SERPL CALC-SCNC: 11 MMOL/L (ref 10–20)
AST SERPL W P-5'-P-CCNC: 24 U/L (ref 9–39)
BASOPHILS # BLD AUTO: 0.03 X10*3/UL (ref 0–0.1)
BASOPHILS NFR BLD AUTO: 0.6 %
BILIRUB SERPL-MCNC: 0.4 MG/DL (ref 0–1.2)
BUN SERPL-MCNC: 22 MG/DL (ref 6–23)
CALCIUM SERPL-MCNC: 9.4 MG/DL (ref 8.6–10.3)
CHLORIDE SERPL-SCNC: 106 MMOL/L (ref 98–107)
CO2 SERPL-SCNC: 27 MMOL/L (ref 21–32)
CREAT SERPL-MCNC: 1.64 MG/DL (ref 0.5–1.3)
EGFRCR SERPLBLD CKD-EPI 2021: 43 ML/MIN/1.73M*2
EOSINOPHIL # BLD AUTO: 0.16 X10*3/UL (ref 0–0.4)
EOSINOPHIL NFR BLD AUTO: 3.4 %
ERYTHROCYTE [DISTWIDTH] IN BLOOD BY AUTOMATED COUNT: 16.9 % (ref 11.5–14.5)
GLUCOSE SERPL-MCNC: 91 MG/DL (ref 74–99)
HCT VFR BLD AUTO: 41.1 % (ref 41–52)
HGB BLD-MCNC: 13.3 G/DL (ref 13.5–17.5)
HOLD SPECIMEN: NORMAL
IMM GRANULOCYTES # BLD AUTO: 0.01 X10*3/UL (ref 0–0.5)
IMM GRANULOCYTES NFR BLD AUTO: 0.2 % (ref 0–0.9)
LYMPHOCYTES # BLD AUTO: 1.73 X10*3/UL (ref 0.8–3)
LYMPHOCYTES NFR BLD AUTO: 36.7 %
MCH RBC QN AUTO: 29.9 PG (ref 26–34)
MCHC RBC AUTO-ENTMCNC: 32.4 G/DL (ref 32–36)
MCV RBC AUTO: 92 FL (ref 80–100)
MONOCYTES # BLD AUTO: 0.44 X10*3/UL (ref 0.05–0.8)
MONOCYTES NFR BLD AUTO: 9.3 %
NEUTROPHILS # BLD AUTO: 2.35 X10*3/UL (ref 1.6–5.5)
NEUTROPHILS NFR BLD AUTO: 49.8 %
NRBC BLD-RTO: 0 /100 WBCS (ref 0–0)
PLATELET # BLD AUTO: 189 X10*3/UL (ref 150–450)
POTASSIUM SERPL-SCNC: 4.4 MMOL/L (ref 3.5–5.3)
PROT SERPL-MCNC: 7.4 G/DL (ref 6.4–8.2)
RBC # BLD AUTO: 4.45 X10*6/UL (ref 4.5–5.9)
SODIUM SERPL-SCNC: 140 MMOL/L (ref 136–145)
TESTOST SERPL-MCNC: 414 NG/DL (ref 240–1000)
WBC # BLD AUTO: 4.7 X10*3/UL (ref 4.4–11.3)

## 2025-08-11 PROCEDURE — 85025 COMPLETE CBC W/AUTO DIFF WBC: CPT

## 2025-08-11 PROCEDURE — 99214 OFFICE O/P EST MOD 30 MIN: CPT

## 2025-08-11 PROCEDURE — 84154 ASSAY OF PSA FREE: CPT

## 2025-08-11 PROCEDURE — 36415 COLL VENOUS BLD VENIPUNCTURE: CPT

## 2025-08-11 PROCEDURE — 84403 ASSAY OF TOTAL TESTOSTERONE: CPT

## 2025-08-11 PROCEDURE — 80053 COMPREHEN METABOLIC PANEL: CPT

## 2025-08-11 ASSESSMENT — PAIN SCALES - GENERAL: PAINLEVEL_OUTOF10: 0-NO PAIN

## 2025-08-14 ENCOUNTER — RESULTS FOLLOW-UP (OUTPATIENT)
Dept: RADIATION ONCOLOGY | Facility: HOSPITAL | Age: 75
End: 2025-08-14
Payer: MEDICARE

## 2025-08-22 ENCOUNTER — ANTICOAGULATION - WARFARIN VISIT (OUTPATIENT)
Dept: CARDIOLOGY | Facility: CLINIC | Age: 75
End: 2025-08-22
Payer: MEDICARE

## 2025-08-22 DIAGNOSIS — I26.99 PULMONARY EMBOLISM, UNSPECIFIED CHRONICITY, UNSPECIFIED PULMONARY EMBOLISM TYPE, UNSPECIFIED WHETHER ACUTE COR PULMONALE PRESENT (MULTI): Primary | ICD-10-CM

## 2025-08-22 LAB
POC INR: 2.2 (ref 0.9–1.1)
POC PROTHROMBIN TIME: NORMAL (ref 9.3–12.5)

## 2025-08-22 PROCEDURE — 99211 OFF/OP EST MAY X REQ PHY/QHP: CPT | Performed by: INTERNAL MEDICINE

## 2025-08-22 PROCEDURE — 85610 PROTHROMBIN TIME: CPT | Mod: QW | Performed by: INTERNAL MEDICINE

## 2025-09-02 ENCOUNTER — APPOINTMENT (OUTPATIENT)
Dept: ORTHOPEDIC SURGERY | Facility: HOSPITAL | Age: 75
End: 2025-09-02
Payer: MEDICARE

## 2025-09-16 ENCOUNTER — APPOINTMENT (OUTPATIENT)
Dept: ORTHOPEDIC SURGERY | Facility: HOSPITAL | Age: 75
End: 2025-09-16
Payer: MEDICARE

## 2025-10-06 ENCOUNTER — APPOINTMENT (OUTPATIENT)
Dept: NEPHROLOGY | Facility: CLINIC | Age: 75
End: 2025-10-06
Payer: MEDICARE

## (undated) DEVICE — DRESSING, MEPILEX, POST OP, 8 X 4

## (undated) DEVICE — DRAPE, TOWEL, STERI DRAPE, 17 X 11 IN, PLASTIC, STERILE

## (undated) DEVICE — DRAPE PACK, MAJOR, OPTIMA, PEDIATRIC, 77 X 108 IN, DISPOSABLE, LF, STERILE

## (undated) DEVICE — DRAPE, MICROSCOPE, FOR ZEISS 65MM, VARI-LENS II, 52 X 150

## (undated) DEVICE — CATHETER, IV, ANGIOCATH, 14 G X 1.88 IN, FEP POLYMER

## (undated) DEVICE — TUBING, SMOKE EVAC, 3/8 X 10 FT

## (undated) DEVICE — BUR, MR8 14CM 3MM DIA, MATCH HEAD, LOW PROFILE SP SYM-TRI

## (undated) DEVICE — Device

## (undated) DEVICE — PAD, GROUNDING, ELECTROSURGICAL, W/9 FT CABLE, POLYHESIVE II, ADULT, LF

## (undated) DEVICE — TAPE, SILK, DURAPORE, 3 IN X 10 YD, LF

## (undated) DEVICE — KIT, PATIENT CARE, JACKSON TABLE W/PRONE-SAFE HEADREST

## (undated) DEVICE — APPLICATOR, CHLORAPREP, W/ORANGE TINT, 26ML

## (undated) DEVICE — GOWN, SURGICAL, SMARTGOWN, XLARGE, STERILE

## (undated) DEVICE — MARKER, SKIN, RULER AND LABEL PACK, CUSTOM

## (undated) DEVICE — SKIN CLOSURE SYS, PREMIERPRO EXOFIN, 1-4CM X 22CM, 1.75G TUBE

## (undated) DEVICE — COVER, CART, 45 X 27 X 48 IN, CLEAR

## (undated) DEVICE — SEALANT, HEMOSTATIC, FLOSEAL, 10 ML

## (undated) DEVICE — ADHESIVE, SKIN, LIQUIBAND EXCEED

## (undated) DEVICE — BUR, 3MM X 3.8MM, PRECISION, NEURO DRILL

## (undated) DEVICE — GLOVE, SURGICAL, PROTEXIS PI BLUE W/NEUTHERA, 8.0, PF, LF

## (undated) DEVICE — DRAPE COVER, C ARM, FLOUROSCAN IMAGING SYS

## (undated) DEVICE — DRAPE, INCISE, DIRECTIONAL, FENESTRATED, PEDIATRIC, STERILE

## (undated) DEVICE — SYRINGE, 20 CC, LUER LOCK, MONOJECT, W/O CAP, LF

## (undated) DEVICE — SUTURE, VICRYL, 0, 18 IN, CT-1, UNDYED

## (undated) DEVICE — TOOL, MR8, 14CM TELESC MATCH, 3MM SYM-TRI

## (undated) DEVICE — ELECTRODE, ELECTROSURGICAL, BLADE, EXTENDED, 6.5 IN, STAINLESS STEEL

## (undated) DEVICE — GLOVE, SURGICAL, PROTEXIS PI ORTHO, 7.5, PF, LF

## (undated) DEVICE — MANIFOLD, 4 PORT NEPTUNE STANDARD

## (undated) DEVICE — DRESSING, MEPILEX, BORDER FLEX, 6 X 8

## (undated) DEVICE — DRESSING, MEPILEX, BORDER FLEX, 3 X 3